# Patient Record
Sex: MALE | Race: WHITE | NOT HISPANIC OR LATINO | Employment: FULL TIME | URBAN - METROPOLITAN AREA
[De-identification: names, ages, dates, MRNs, and addresses within clinical notes are randomized per-mention and may not be internally consistent; named-entity substitution may affect disease eponyms.]

---

## 2017-02-03 ENCOUNTER — ALLSCRIPTS OFFICE VISIT (OUTPATIENT)
Dept: OTHER | Facility: OTHER | Age: 42
End: 2017-02-03

## 2017-05-22 ENCOUNTER — LAB (OUTPATIENT)
Dept: LAB | Facility: HOSPITAL | Age: 42
End: 2017-05-22
Attending: INTERNAL MEDICINE
Payer: COMMERCIAL

## 2017-05-22 ENCOUNTER — TRANSCRIBE ORDERS (OUTPATIENT)
Dept: ADMINISTRATIVE | Facility: HOSPITAL | Age: 42
End: 2017-05-22

## 2017-05-22 DIAGNOSIS — R53.83 FATIGUE, UNSPECIFIED TYPE: ICD-10-CM

## 2017-05-22 DIAGNOSIS — R10.9 ABDOMINAL PAIN, UNSPECIFIED SITE: ICD-10-CM

## 2017-05-22 DIAGNOSIS — Z79.899 ENCOUNTER FOR LONG-TERM (CURRENT) USE OF OTHER MEDICATIONS: ICD-10-CM

## 2017-05-22 DIAGNOSIS — E29.1 3-OXO-5 ALPHA-STEROID DELTA 4-DEHYDROGENASE DEFICIENCY: ICD-10-CM

## 2017-05-22 DIAGNOSIS — F43.9 STATE OF STRESS: ICD-10-CM

## 2017-05-22 DIAGNOSIS — R10.9 ABDOMINAL PAIN, UNSPECIFIED SITE: Primary | ICD-10-CM

## 2017-05-22 LAB
ALBUMIN SERPL BCP-MCNC: 3.9 G/DL (ref 3.5–5)
ALP SERPL-CCNC: 48 U/L (ref 46–116)
ALT SERPL W P-5'-P-CCNC: 24 U/L (ref 12–78)
ANION GAP SERPL CALCULATED.3IONS-SCNC: 7 MMOL/L (ref 4–13)
AST SERPL W P-5'-P-CCNC: 14 U/L (ref 5–45)
BASOPHILS # BLD AUTO: 0 THOUSANDS/ΜL (ref 0–0.1)
BASOPHILS NFR BLD AUTO: 1 % (ref 0–1)
BILIRUB SERPL-MCNC: 0.5 MG/DL (ref 0.2–1)
BILIRUB UR QL STRIP: NEGATIVE
BUN SERPL-MCNC: 18 MG/DL (ref 5–25)
CALCIUM SERPL-MCNC: 8.8 MG/DL (ref 8.3–10.1)
CHLORIDE SERPL-SCNC: 105 MMOL/L (ref 100–108)
CHOLEST SERPL-MCNC: 157 MG/DL (ref 50–200)
CLARITY UR: CLEAR
CO2 SERPL-SCNC: 32 MMOL/L (ref 21–32)
COLOR UR: YELLOW
CREAT SERPL-MCNC: 0.9 MG/DL (ref 0.6–1.3)
EOSINOPHIL # BLD AUTO: 0.1 THOUSAND/ΜL (ref 0–0.61)
EOSINOPHIL NFR BLD AUTO: 2 % (ref 0–6)
ERYTHROCYTE [DISTWIDTH] IN BLOOD BY AUTOMATED COUNT: 13.4 % (ref 11.6–15.1)
ERYTHROCYTE [SEDIMENTATION RATE] IN BLOOD: 2 MM/HOUR (ref 2–10)
GFR SERPL CREATININE-BSD FRML MDRD: >60 ML/MIN/1.73SQ M
GLUCOSE P FAST SERPL-MCNC: 93 MG/DL (ref 65–99)
GLUCOSE UR STRIP-MCNC: NEGATIVE MG/DL
HCT VFR BLD AUTO: 44.4 % (ref 42–52)
HDLC SERPL-MCNC: 42 MG/DL (ref 40–60)
HGB BLD-MCNC: 14.7 G/DL (ref 14–18)
HGB UR QL STRIP.AUTO: NEGATIVE
IRON SERPL-MCNC: 92 UG/DL (ref 65–175)
KETONES UR STRIP-MCNC: NEGATIVE MG/DL
LDLC SERPL CALC-MCNC: 100 MG/DL (ref 0–100)
LEUKOCYTE ESTERASE UR QL STRIP: NEGATIVE
LYMPHOCYTES # BLD AUTO: 2.4 THOUSANDS/ΜL (ref 0.6–4.47)
LYMPHOCYTES NFR BLD AUTO: 42 % (ref 14–44)
MAGNESIUM SERPL-MCNC: 2.3 MG/DL (ref 1.6–2.6)
MCH RBC QN AUTO: 27.2 PG (ref 27–31)
MCHC RBC AUTO-ENTMCNC: 33 G/DL (ref 31.4–37.4)
MCV RBC AUTO: 82 FL (ref 82–98)
MONOCYTES # BLD AUTO: 0.4 THOUSAND/ΜL (ref 0.17–1.22)
MONOCYTES NFR BLD AUTO: 8 % (ref 4–12)
NEUTROPHILS # BLD AUTO: 2.8 THOUSANDS/ΜL (ref 1.85–7.62)
NEUTS SEG NFR BLD AUTO: 48 % (ref 43–75)
NITRITE UR QL STRIP: NEGATIVE
NRBC BLD AUTO-RTO: 0 /100 WBCS
PH UR STRIP.AUTO: 6.5 [PH] (ref 5–9)
PLATELET # BLD AUTO: 327 THOUSANDS/UL (ref 130–400)
PMV BLD AUTO: 8.2 FL (ref 8.9–12.7)
POTASSIUM SERPL-SCNC: 4.1 MMOL/L (ref 3.5–5.3)
PROT SERPL-MCNC: 6.6 G/DL (ref 6.4–8.2)
PROT UR STRIP-MCNC: NEGATIVE MG/DL
RBC # BLD AUTO: 5.39 MILLION/UL (ref 4.7–6.1)
SODIUM SERPL-SCNC: 144 MMOL/L (ref 136–145)
SP GR UR STRIP.AUTO: 1.02 (ref 1–1.03)
TESTOST SERPL-MCNC: 248.8 NG/DL (ref 241–827)
TRIGL SERPL-MCNC: 77 MG/DL
TSH SERPL DL<=0.05 MIU/L-ACNC: 1.29 UIU/ML (ref 0.36–3.74)
UROBILINOGEN UR QL STRIP.AUTO: 0.2 E.U./DL
VIT B12 SERPL-MCNC: 572 PG/ML (ref 100–900)
WBC # BLD AUTO: 5.8 THOUSAND/UL (ref 4.8–10.8)

## 2017-05-22 PROCEDURE — 84403 ASSAY OF TOTAL TESTOSTERONE: CPT

## 2017-05-22 PROCEDURE — 83516 IMMUNOASSAY NONANTIBODY: CPT

## 2017-05-22 PROCEDURE — 80061 LIPID PANEL: CPT

## 2017-05-22 PROCEDURE — 85652 RBC SED RATE AUTOMATED: CPT

## 2017-05-22 PROCEDURE — 81003 URINALYSIS AUTO W/O SCOPE: CPT

## 2017-05-22 PROCEDURE — 36415 COLL VENOUS BLD VENIPUNCTURE: CPT

## 2017-05-22 PROCEDURE — 84443 ASSAY THYROID STIM HORMONE: CPT

## 2017-05-22 PROCEDURE — 85025 COMPLETE CBC W/AUTO DIFF WBC: CPT

## 2017-05-22 PROCEDURE — 80053 COMPREHEN METABOLIC PANEL: CPT

## 2017-05-22 PROCEDURE — 83540 ASSAY OF IRON: CPT

## 2017-05-22 PROCEDURE — 86255 FLUORESCENT ANTIBODY SCREEN: CPT

## 2017-05-22 PROCEDURE — 82607 VITAMIN B-12: CPT

## 2017-05-22 PROCEDURE — 82784 ASSAY IGA/IGD/IGG/IGM EACH: CPT

## 2017-05-22 PROCEDURE — 83735 ASSAY OF MAGNESIUM: CPT

## 2017-05-22 PROCEDURE — 86618 LYME DISEASE ANTIBODY: CPT

## 2017-05-23 LAB
ENDOMYSIUM IGA SER QL: NEGATIVE
GLIADIN PEPTIDE IGA SER-ACNC: 3 UNITS (ref 0–19)
GLIADIN PEPTIDE IGG SER-ACNC: 1 UNITS (ref 0–19)
IGA SERPL-MCNC: 190 MG/DL (ref 90–386)
TTG IGA SER-ACNC: <2 U/ML (ref 0–3)
TTG IGG SER-ACNC: <2 U/ML (ref 0–5)

## 2017-05-24 LAB
B BURGDOR IGG SER IA-ACNC: 0.3
B BURGDOR IGM SER IA-ACNC: 0.2

## 2017-12-30 ENCOUNTER — ALLSCRIPTS OFFICE VISIT (OUTPATIENT)
Dept: OTHER | Facility: OTHER | Age: 42
End: 2017-12-30

## 2017-12-31 NOTE — PROGRESS NOTES
Assessment   1  Acute sinusitis (461 9) (J01 90)    Plan   Acute sinusitis    · Amoxicillin-Pot Clavulanate 875-125 MG Oral Tablet (Augmentin); TAKE 1 TABLET    EVERY 12 HOURS UNTIL GONE   · Combivent Respimat  MCG/ACT Inhalation Aerosol Solution; INHALE 1    PUFF 4 TIMES DAILY (MAXIMUM OF 6 PUFFS IN 24 HOURS)   · Ipratropium-Albuterol 0 5-2 5 (3) MG/3ML Inhalation Solution; given in office   · Irrigate your nose twice a day ; Status:Complete;   Done: [id number] 11:58AM   · Taking a hot steamy shower may help your condition ; Status:Complete;   Done:    68DVF0352 11:58AM   · Call [phone] if: The symptoms are not better in 7 days ; Status:Complete;   Done:    37PUF0452 11:58AM    Discussion/Summary      Follow up if not improving  Chief Complaint   sinus & chest congestion ,soretthroat      History of Present Illness   HPI: Here with illness for 2-3 weeks  Cold Symptoms:    Kayleigh Godwin presents with complaints of gradual onset of moderate cold symptoms starting about 2 weeks ago  He is currently experiencing cold symptoms  Symptoms have been worsening for about 3 days  Associated symptoms include nasal congestion,-- sore throat,-- ear pain,-- wheezing,-- shortness of breath-- and-- chills  The patient presents with complaints of productive cough (green mucus)      The patient presents with complaints of fever, described as > 101 f  Review of Systems        Constitutional: as noted in HPI       ENT: as noted in HPI  Respiratory: as noted in HPI  Active Problems   1  Acid reflux disease (530 81) (K21 9)   2  Acute sinusitis (461 9) (J01 90)   3  Anxiety (300 00) (F41 9)   4  Body mass index (BMI) of 23 0 to 23 9 in adult (V85 1) (Z68 23)   5  Depression (311) (F32 9)   6  Hyperlipidemia (272 4) (E78 5)   7  Long term use of drug (V58 69) (Z79 899)   8  Never a smoker   9  Persistent insomnia (307 42) (G47 00)    Past Medical History   1   Contact dermatitis due to plant (692  6) (L25 5)   2  History of Exposure to influenza (V01 79) (Z20 828)   3  History of acute pharyngitis (V12 69) (Z87 09)   4  History of influenza (V12 09) (Z87 09)   5  Other acute sinusitis (461 8) (J01 80)  Active Problems And Past Medical History Reviewed: The active problems and past medical history were reviewed and updated today  Family History   Mother    1  Family history of hypertension (V17 49) (Z82 49)   2  Family history of lung cancer (V16 1) (Z80 1)  Father    3  Family history of Anxiety   4  Family history of hypertension (V17 49) (Z82 49)  Family History Reviewed: The family history was reviewed and updated today  Social History    · Never a smoker  The social history was reviewed and updated today  The social history was reviewed and is unchanged  Surgical History   1  History of Appendectomy   2  History of Cholecystectomy   3  History of Inguinal Hernia Repair   4  History of Umbilical Hernia Repair   5  History of Ventral Hernia Repair  Surgical History Reviewed: The surgical history was reviewed and updated today  Current Meds    1  Pantoprazole Sodium 40 MG Oral Tablet Delayed Release; take one tablet by mouth     every day; Therapy[facility] 08POO2586 [facility]:[id number])  Requested for: 21Msk9605; Last     Rx:[id number] Ordered     The medication list was reviewed and updated today  Allergies   1  Crestor TABS   2  Levaquin TABS    Vitals    Recorded: 95OUR3838 11:26AM   Temperature 97 2 F   Heart Rate 84   Respiration 16   Systolic 157   Diastolic 86   Height 5 ft 10 in   Weight 210 lb    BMI Calculated 30 13   BSA Calculated 2 13     Physical Exam        Constitutional      General appearance: No acute distress, well appearing and well nourished  Ears, Nose, Mouth, and Throat      External inspection of ears and nose: Normal        Otoscopic examination: Abnormal   The right tympanic membrane was retracted   The left tympanic membrane was retracted  Nasal mucosa, septum, and turbinates: Abnormal   The bilateral nasal mucosa was crusted,-- edematous-- and-- red  Oropharynx: Normal with no erythema, edema, exudate or lesions  Pulmonary      Respiratory effort: No increased work of breathing or signs of respiratory distress  Auscultation of lungs: Abnormal  -- (decreased air movement, improved after duoneb in the office)      Cardiovascular      Auscultation of heart: Normal rate and rhythm, normal S1 and S2, without murmurs  Lymphatic      Palpation of lymph nodes in neck: No lymphadenopathy            Signatures    Electronically signed by : GRAYSON Ramirez ; Dec 30 2017 11:59AM EST                       (Author)

## 2018-01-12 VITALS
DIASTOLIC BLOOD PRESSURE: 80 MMHG | HEIGHT: 70 IN | SYSTOLIC BLOOD PRESSURE: 130 MMHG | RESPIRATION RATE: 18 BRPM | HEART RATE: 78 BPM | BODY MASS INDEX: 23.05 KG/M2 | TEMPERATURE: 98.2 F | WEIGHT: 161 LBS

## 2018-01-15 NOTE — MISCELLANEOUS
Message   Recorded as Task   Date: 12/06/2016 06:03 PM, Created By: Win Dick   Task Name: Medical Complaint Callback   Assigned To: Sreekanth Kapadia   Regarding Patient: Tessie Silver, Status: Active   Comment:    Win Dick - 06 Dec 2016 6:03 PM     TASK CREATED  Caller: Becca, Spouse; Medical Complaint  Patient was seen earlier today for poison ivy, he was given a shot and told he would start to see relief soon  His rashes are getting worse  His wife called because she feels he is going to need a steroid pill and would like it this evening sent to pharmacy  Please call her at [phone]   [facility] - 06 Dec 2016 6:05 PM     TASK REASSIGNED: Previously Assigned To Saad1 [employer]  please advise     Sreekanth Kapadia - 06 Dec 2016 7:11 PM     TASK EDITED  s/w wife  not better yet  I suggested Claritin or equiv qd with Benadryl qhs prn since he's drowsy  f/u if no better  give it 24-48hrs to work        Signatures   Electronically signed by : Leigh Abdi DO; Dec  6 2016  7:11PM EST                       (Author)

## 2018-01-15 NOTE — MISCELLANEOUS
Provider Comments  Provider Comments:   LMOM REGARDING NO SHOW ON 9/28/2016      Signatures   Electronically signed by : Giles Garcia DO; Sep 28 2016  7:26PM EST                       (Review)

## 2018-01-23 VITALS
SYSTOLIC BLOOD PRESSURE: 126 MMHG | DIASTOLIC BLOOD PRESSURE: 86 MMHG | TEMPERATURE: 97.2 F | BODY MASS INDEX: 30.06 KG/M2 | WEIGHT: 210 LBS | RESPIRATION RATE: 16 BRPM | HEART RATE: 84 BPM | HEIGHT: 70 IN

## 2018-02-01 ENCOUNTER — OFFICE VISIT (OUTPATIENT)
Dept: FAMILY MEDICINE CLINIC | Facility: CLINIC | Age: 43
End: 2018-02-01
Payer: COMMERCIAL

## 2018-02-01 VITALS
HEIGHT: 71 IN | DIASTOLIC BLOOD PRESSURE: 80 MMHG | WEIGHT: 213 LBS | TEMPERATURE: 98.3 F | SYSTOLIC BLOOD PRESSURE: 124 MMHG | HEART RATE: 76 BPM | BODY MASS INDEX: 29.82 KG/M2 | RESPIRATION RATE: 20 BRPM

## 2018-02-01 DIAGNOSIS — R91.1 PULMONARY NODULE: ICD-10-CM

## 2018-02-01 DIAGNOSIS — J20.9 ACUTE BRONCHITIS, UNSPECIFIED ORGANISM: Primary | ICD-10-CM

## 2018-02-01 PROCEDURE — 96372 THER/PROPH/DIAG INJ SC/IM: CPT | Performed by: INTERNAL MEDICINE

## 2018-02-01 PROCEDURE — 99213 OFFICE O/P EST LOW 20 MIN: CPT | Performed by: INTERNAL MEDICINE

## 2018-02-01 RX ORDER — AMOXICILLIN AND CLAVULANATE POTASSIUM 875; 125 MG/1; MG/1
1 TABLET, FILM COATED ORAL EVERY 12 HOURS
COMMUNITY
Start: 2017-12-30 | End: 2018-02-01 | Stop reason: SDUPTHER

## 2018-02-01 RX ORDER — IPRATROPIUM BROMIDE AND ALBUTEROL SULFATE 2.5; .5 MG/3ML; MG/3ML
3 SOLUTION RESPIRATORY (INHALATION)
Status: DISCONTINUED | OUTPATIENT
Start: 2018-02-01 | End: 2018-02-15

## 2018-02-01 RX ORDER — METHYLPREDNISOLONE 4 MG/1
TABLET ORAL
Qty: 21 TABLET | Refills: 0 | Status: SHIPPED | OUTPATIENT
Start: 2018-02-01 | End: 2018-02-15

## 2018-02-01 RX ORDER — AMOXICILLIN AND CLAVULANATE POTASSIUM 875; 125 MG/1; MG/1
1 TABLET, FILM COATED ORAL EVERY 12 HOURS
Qty: 20 TABLET | Refills: 0 | Status: SHIPPED | OUTPATIENT
Start: 2018-02-01 | End: 2018-02-11

## 2018-02-01 RX ADMIN — IPRATROPIUM BROMIDE AND ALBUTEROL SULFATE 3 ML: 2.5; .5 SOLUTION RESPIRATORY (INHALATION) at 19:22

## 2018-02-01 NOTE — PROGRESS NOTES
Assessment/Plan:    No problem-specific Assessment & Plan notes found for this encounter  Diagnoses and all orders for this visit:    Acute bronchitis, unspecified organism  -     ipratropium-albuterol (DUO-NEB) 0 5-2 5 mg/3 mL inhalation solution 3 mL; Take 3 mL by nebulization every 6 (six) hours   -     CT chest wo contrast; Future  -     amoxicillin-clavulanate (AUGMENTIN) 875-125 mg per tablet; Take 1 tablet by mouth every 12 (twelve) hours for 10 days  -     Methylprednisolone 4 MG TBPK; Use as directed on package  -     Ambulatory referral to Pulmonology; Future    Pulmonary nodule  -     CT chest wo contrast; Future  -     Ambulatory referral to Pulmonology; Future    Other orders  -     ipratropium-albuterol (COMBIVENT RESPIMAT) inhaler; Inhale 1 puff 4 (four) times a day  -     Discontinue: amoxicillin-clavulanate (AUGMENTIN) 875-125 mg per tablet; Take 1 tablet by mouth every 12 (twelve) hours          There are no Patient Instructions on file for this visit  Return if symptoms worsen or fail to improve  Subjective:      Patient ID: Anne Marie Ward is a 44 y o  male  Chief Complaint   Patient presents with    Chest Pain     chest tightness    Shortness of Breath    Cough     dry cough    Nasal Drainage       Here with an acute illness  Did get better after last visit 12/30 but never got rid of sinus symptoms but got acutely worse about a week ago  Lungs are burning, feels like he has a lot of phlegm, feels short of breath at nighttime and when he wakes in the morning  Cold air feels good  Never needed an inhaler prior to last visit  Has a lot of exposure to smoke and chemicals at work (is a )  Also has history of pulmonary nodule by Ct, previous physician passed away and he is in need of follow up study          The following portions of the patient's history were reviewed and updated as appropriate: allergies, current medications, past family history, past medical history, past social history, past surgical history and problem list     Review of Systems   Constitutional: Positive for fever  Negative for fatigue  HENT: Positive for congestion, postnasal drip, rhinorrhea and sinus pressure  Respiratory: Positive for cough, shortness of breath and wheezing  Cardiovascular: Negative  Current Outpatient Prescriptions   Medication Sig Dispense Refill    Ibuprofen (ADVIL) 200 MG CAPS Take 1 tablet by mouth 2 (two) times a day   ipratropium-albuterol (COMBIVENT RESPIMAT) inhaler Inhale 1 puff 4 (four) times a day      pantoprazole (PROTONIX) 40 mg tablet Take 40 mg by mouth daily   amoxicillin-clavulanate (AUGMENTIN) 875-125 mg per tablet Take 1 tablet by mouth every 12 (twelve) hours for 10 days 20 tablet 0    desvenlafaxine (PRISTIQ) 100 mg 24 hr tablet Take 40 mg by mouth daily   LORazepam (ATIVAN) 0 5 mg tablet Take 0 5 mg by mouth every 6 (six) hours as needed for anxiety   Methylprednisolone 4 MG TBPK Use as directed on package 21 tablet 0    temazepam (RESTORIL) 7 5 mg capsule Take 30 mg by mouth daily at bedtime as needed for sleep  Current Facility-Administered Medications   Medication Dose Route Frequency Provider Last Rate Last Dose    ipratropium-albuterol (DUO-NEB) 0 5-2 5 mg/3 mL inhalation solution 3 mL  3 mL Nebulization Q6H Nataliia Rodriguez MD           Objective:    /80   Pulse 76   Temp 98 3 °F (36 8 °C)   Resp 20   Ht 5' 11" (1 803 m)   Wt 96 6 kg (213 lb)   BMI 29 71 kg/m²        Physical Exam   Constitutional: He appears well-developed and well-nourished  HENT:   Head: Normocephalic and atraumatic  Right Ear: Tympanic membrane is retracted  Left Ear: Tympanic membrane is retracted  Nose: Mucosal edema present  MMM   Eyes: Conjunctivae are normal    Neck: Neck supple  Cardiovascular: Normal rate, regular rhythm and normal heart sounds  Pulmonary/Chest: Effort normal  He has no wheezes  Poor air movement; improved after duoneb  No wheezes, rales, rhonchi  Lymphadenopathy:     He has no cervical adenopathy                Leigh Stock MD

## 2018-02-02 NOTE — ASSESSMENT & PLAN NOTE
Due to the recurrent nature and possible exposures at work, patient was referred to pulmonology  Advised ER for worsening symptoms, return visit if not improving

## 2018-02-05 ENCOUNTER — TELEPHONE (OUTPATIENT)
Dept: FAMILY MEDICINE CLINIC | Facility: CLINIC | Age: 43
End: 2018-02-05

## 2018-02-05 ENCOUNTER — HOSPITAL ENCOUNTER (OUTPATIENT)
Dept: CT IMAGING | Facility: HOSPITAL | Age: 43
Discharge: HOME | End: 2018-02-05
Attending: INTERNAL MEDICINE
Payer: COMMERCIAL

## 2018-02-05 DIAGNOSIS — J20.9 ACUTE BRONCHITIS, UNSPECIFIED ORGANISM: ICD-10-CM

## 2018-02-05 DIAGNOSIS — J20.9 ACUTE BRONCHITIS, UNSPECIFIED ORGANISM: Primary | ICD-10-CM

## 2018-02-05 DIAGNOSIS — R91.1 PULMONARY NODULE: ICD-10-CM

## 2018-02-05 PROCEDURE — 71250 CT THORAX DX C-: CPT

## 2018-02-05 NOTE — TELEPHONE ENCOUNTER
Pt seen Thursday night, prescribed prednisone and abx, also had a ct scan done today   Still having difficulty breathing at night, anything else we can recommend or prescribe

## 2018-02-05 NOTE — TELEPHONE ENCOUNTER
Please advise  Pt was seen by Dr Gagandeep Sainz on 2/1/18  She will not be in office until Wednesday   Siri Hodgkin, [place]

## 2018-02-06 RX ORDER — IPRATROPIUM BROMIDE AND ALBUTEROL SULFATE 2.5; .5 MG/3ML; MG/3ML
3 SOLUTION RESPIRATORY (INHALATION)
Qty: 75 ML | Refills: 0 | Status: SHIPPED | OUTPATIENT
Start: 2018-02-06 | End: 2018-02-15

## 2018-02-06 RX ORDER — PREDNISONE 10 MG/1
10 TABLET ORAL DAILY
Qty: 40 TABLET | Refills: 0 | Status: SHIPPED | OUTPATIENT
Start: 2018-02-06 | End: 2018-02-09 | Stop reason: SDUPTHER

## 2018-02-06 NOTE — TELEPHONE ENCOUNTER
2/6/2018 5:06 PM spoke with his wife  He is not getting better with the inhaler  He did feel better with the nebulizer in the office  I sent in a prescription for florin and called the pharmacy to make sure they had neb supplies for him     Tamiko Muñoz, DO

## 2018-02-06 NOTE — TELEPHONE ENCOUNTER
2/5/2018 7:21 PM Returned call to patient  Unable to leave message on voice mail - [facility] Damon is full  I then call the [phone] number and left a message to call the office  I can see his CT scan was done, but it has not been read yet    Scarlett Vaz, DO

## 2018-02-06 NOTE — TELEPHONE ENCOUNTER
Spoke with wife  Agreed  To the higher dose of steroid  And  Scheduled a follow up appointment with Dr Cecilio Winkler  On Thursday

## 2018-02-06 NOTE — TELEPHONE ENCOUNTER
Pt is aware that the Ct Scan results are not ready, they called again because they want to know if there is something else that he can take or be prescribed to help him breathe at night

## 2018-02-06 NOTE — TELEPHONE ENCOUNTER
[phone]  Madinainocencio Fitching, wife returning call for CT chest results    Please call when they come in    Thank you

## 2018-02-06 NOTE — TELEPHONE ENCOUNTER
2/6/2018 5:19 PM spoke with his wife again  [facility] does not carry the tubing supplies  She is going to try another pharmacy    Erna Godwin, DO

## 2018-02-06 NOTE — TELEPHONE ENCOUNTER
Spoke with Aracelis Patel pt wife  Made aware CT has not been finalized yet  States pt  Is having  A hard time breathing when he lies down, and coughing  He has the prednisone pack and the abx  Aracelis Patel also states  She cleared out all her voicemail messages  Please advise Dr Rani Lopez will not be in until tomorrow      af/rma

## 2018-02-07 ENCOUNTER — TELEPHONE (OUTPATIENT)
Dept: FAMILY MEDICINE CLINIC | Facility: CLINIC | Age: 43
End: 2018-02-07

## 2018-02-07 NOTE — TELEPHONE ENCOUNTER
I spoke with patient about his results, these appear to be stable from last Ct  He is feeling better from nebulizer last night and his wife has already made an appointment with Dr Jim Rai for evaluation of continued symptoms

## 2018-02-09 DIAGNOSIS — J20.9 ACUTE BRONCHITIS, UNSPECIFIED ORGANISM: ICD-10-CM

## 2018-02-09 RX ORDER — PREDNISONE 10 MG/1
10 TABLET ORAL DAILY
Qty: 40 TABLET | Refills: 0 | Status: SHIPPED | OUTPATIENT
Start: 2018-02-09 | End: 2018-02-25

## 2018-02-13 ENCOUNTER — TELEPHONE (OUTPATIENT)
Dept: FAMILY MEDICINE CLINIC | Facility: CLINIC | Age: 43
End: 2018-02-13

## 2018-02-14 ENCOUNTER — TELEPHONE (OUTPATIENT)
Dept: FAMILY MEDICINE CLINIC | Facility: CLINIC | Age: 43
End: 2018-02-14

## 2018-02-14 ENCOUNTER — TRANSCRIBE ORDERS (OUTPATIENT)
Dept: PULMONOLOGY | Facility: MEDICAL CENTER | Age: 43
End: 2018-02-14

## 2018-02-14 DIAGNOSIS — K21.9 GASTROESOPHAGEAL REFLUX DISEASE, ESOPHAGITIS PRESENCE NOT SPECIFIED: Primary | ICD-10-CM

## 2018-02-14 RX ORDER — PANTOPRAZOLE SODIUM 40 MG/1
40 TABLET, DELAYED RELEASE ORAL DAILY
Qty: 30 TABLET | Refills: 3 | Status: SHIPPED | OUTPATIENT
Start: 2018-02-14 | End: 2018-04-02 | Stop reason: SDUPTHER

## 2018-02-14 NOTE — TELEPHONE ENCOUNTER
DR    Please call in pantoprazole to [facility] in [place]     ASAP

## 2018-02-14 NOTE — TELEPHONE ENCOUNTER
Wife Cammie Murguia is calling because she is trying to get an apt for her  at [address] Gigi Gibbs and they can't get him in until March  She wants us to call  Stoney Nealcher is not doing any better, on 3rd round of steroids, still using neubulizer  He has an apt with Dr Harper Diane but wife thinks it is a waste of time  She wants to see a specialists

## 2018-02-15 ENCOUNTER — OFFICE VISIT (OUTPATIENT)
Dept: PULMONOLOGY | Facility: MEDICAL CENTER | Age: 43
End: 2018-02-15
Payer: COMMERCIAL

## 2018-02-15 VITALS
RESPIRATION RATE: 18 BRPM | SYSTOLIC BLOOD PRESSURE: 158 MMHG | BODY MASS INDEX: 29.96 KG/M2 | OXYGEN SATURATION: 94 % | TEMPERATURE: 98.5 F | HEIGHT: 71 IN | HEART RATE: 74 BPM | WEIGHT: 214 LBS | DIASTOLIC BLOOD PRESSURE: 98 MMHG

## 2018-02-15 DIAGNOSIS — R06.02 SHORTNESS OF BREATH: Primary | ICD-10-CM

## 2018-02-15 DIAGNOSIS — J20.9 SUBACUTE BRONCHITIS: ICD-10-CM

## 2018-02-15 DIAGNOSIS — R91.1 PULMONARY NODULE: ICD-10-CM

## 2018-02-15 DIAGNOSIS — K21.9 GASTROESOPHAGEAL REFLUX DISEASE, ESOPHAGITIS PRESENCE NOT SPECIFIED: ICD-10-CM

## 2018-02-15 PROCEDURE — 94010 BREATHING CAPACITY TEST: CPT | Performed by: INTERNAL MEDICINE

## 2018-02-15 PROCEDURE — 99204 OFFICE O/P NEW MOD 45 MIN: CPT | Performed by: INTERNAL MEDICINE

## 2018-02-15 RX ORDER — ALBUTEROL SULFATE 0.63 MG/3ML
1 SOLUTION RESPIRATORY (INHALATION) EVERY 6 HOURS PRN
Qty: 75 ML | Refills: 0 | Status: SHIPPED | OUTPATIENT
Start: 2018-02-15 | End: 2018-10-18 | Stop reason: ALTCHOICE

## 2018-02-15 NOTE — ASSESSMENT & PLAN NOTE
Pulmonary nodule which has been stable  However prior CT images are not available for review  They state that he did have his CT scans here at [facility] will need to go to Radiology were department and review  However these are very small in based on Fleischner criteria they do not require any further follow-up  Imaging is reviewed with the patient and his wife today  Patient's wife request report and this is printed for her today

## 2018-02-15 NOTE — PROGRESS NOTES
Assessment/Plan:       Problem List Items Addressed This Visit        Digestive    Acid reflux disease     This is controlled  However he is advised to take prednisone with food and he will let me know if heartburn worsens with prednisone  Respiratory    Subacute bronchitis     Patient is currently improving with prednisone and nebulizer therapy however he is unable to use the nebulizer as prescribed as it does make him jittery  Patient does have coarse breath sounds and therefore I do believe he would benefit from inhaled steroid  Will start him on Breo  Sample is given to him today  Continue with prednisone  He may use the nebulizer once daily in the evening  Spirometry shows no evidence of obstruction  This is likely virally mediated versus sinus related  Once he is improved he will require repeat ENT evaluation  He is advised to use a mask when he has any occupational exposure as this will help in the healing of his bronchitis  Otherwise continued exposure may make things worse  Relevant Medications    albuterol (ACCUNEB) 0 63 MG/3ML nebulizer solution       Other    Pulmonary nodule     Pulmonary nodule which has been stable  However prior CT images are not available for review  They state that he did have his CT scans here at [facility] will need to go to Radiology were department and review  However these are very small in based on Fleischner criteria they do not require any further follow-up  Imaging is reviewed with the patient and his wife today  Patient's wife request report and this is printed for her today               Other Visit Diagnoses     Shortness of breath    -  Primary    Relevant Medications    albuterol (ACCUNEB) 0 63 MG/3ML nebulizer solution    Other Relevant Orders    POCT spirometry         Patient has markedly elevated [place] sleepiness scale and there is suspicion of underlying obstructive sleep apnea however in the setting of his acute bronchitis will hold off on any testing at this time  Return in about 2 weeks (around 3/1/2018)  All questions are answered to the patient's satisfaction and understanding  He verbalizes understanding  He is encouraged to call with any further questions or concerns  Portions of the record may have been created with voice recognition software  Occasional wrong word or "sound a like" substitutions may have occurred due to the inherent limitations of voice recognition software  Read the chart carefully and recognize, using context, where substitutions have occurred  ______________________________________________________________________    Chief Complaint:   Chief Complaint   Patient presents with    Shortness of Breath     intermittant mostly at night    Bronchitis     Since beginning of December 2017 had 2 rounds of antibiotics     Cough     dry deep occasionally productive        Patient ID: Roger Trejo is a 44 y o  y o  male has a past medical history of Back pain and GERD (gastroesophageal reflux disease)  2/15/2018  Patient presents today for initial pulmonary evaluation for persistent bronchitis for approximately 2 months now  He presents today with his wife  Upon review of recent PCP visits and based on history-  His illness started with acute sinusitis which he initially tried to medicate with Sudafed and other over-the-counter medications however this did not resolve and therefore he saw his primary care physician toward the end of December and was prescribed with antibiotics at that time  He was also prescribed with Combivent at that time because he had complaints of wheezing  His symptoms did improve in between but gradually got worse again with complaints of cough, congestion, sinus problems, wheezing, difficulty taking a deep breath  No fevers    After seeing his primary care physician CT chest was obtained and showed no pneumonia patient did have intrapulmonary lymph node small left lung nodule approximately 2 mm and multiple tiny right peripheral nodules  Patient did have evidence of atelectasis  He is subsequently prescribed with nebulizer DuoNeb and antibiotics at the beginning of this month and he is just now finishing those antibiotics  He was also prescribed with Medrol Dosepak however that did not help  He most recently because of persistence of symptoms he is prescribed with prednisone 40 mg and taper  His breathing and cough is improved on the prednisone  Patient has not taken any time off since developing these symptoms  He does work as a  and is around dust and fumes all the time  He also works around [employer] and fumes associated with this  States that he does wear a mask when he is around the plastics but does not wear a mask any other time  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  Blood pressure is elevated today  Patient states that his blood pressure is never elevated  Possibly in relation to steroids  Steroids are also causing him to be agitated and irritated  When he takes it in the evening it prevents him from falling asleep  His [place] Sleepiness Scale is 20/24 today  He does have snoring  Unknown witnessed apneas or choking and gasping in his sleep  He has never had a sleep study in the past       Breathing Problem   He complains of chest tightness, cough, difficulty breathing, shortness of breath (Does not feel like he is back to his baseline) and wheezing  There is no frequent throat clearing, hemoptysis, hoarse voice or sputum production  This is a recurrent problem  The current episode started more than 1 month ago  The problem occurs constantly  The problem has been gradually improving  The cough is non-productive (Initially productive)   Associated symptoms include chest pain ( patient had chest tightness), dyspnea on exertion, myalgias (This is not new in relation to underlying work ), nasal congestion and postnasal drip ( patient has chronic nasal issues and has seen an ENT physician in the past )  Pertinent negatives include no appetite change, ear congestion, ear pain, fever, headaches, heartburn ( patient has a history of heartburn and takes Protonix for this and heartburn is controlled ), malaise/fatigue, orthopnea, PND, rhinorrhea, sneezing, sore throat, sweats, trouble swallowing or weight loss  Exacerbated by: Nothing known  His symptoms are alleviated by beta-agonist and oral steroids  He reports moderate improvement on treatment  Risk factors for lung disease include occupational exposure  Occupational/Exposure history: works as a - see above  Travel history: no recent travel    Review of Systems   Constitutional: Negative for appetite change, fever, malaise/fatigue and weight loss  HENT: Positive for postnasal drip ( patient has chronic nasal issues and has seen an ENT physician in the past )  Negative for ear pain, hoarse voice, rhinorrhea, sneezing, sore throat and trouble swallowing  Eyes: Negative for visual disturbance  Respiratory: Positive for cough, shortness of breath (Does not feel like he is back to his baseline) and wheezing  Negative for hemoptysis and sputum production  Cardiovascular: Positive for chest pain ( patient had chest tightness) and dyspnea on exertion  Negative for palpitations, leg swelling and PND  Gastrointestinal: Negative for abdominal distention, abdominal pain, diarrhea, heartburn ( patient has a history of heartburn and takes Protonix for this and heartburn is controlled ), nausea and vomiting  Endocrine: Negative for cold intolerance  Genitourinary: Negative for difficulty urinating  Musculoskeletal: Positive for myalgias (This is not new in relation to underlying work  )  Negative for arthralgias and back pain  Skin: Negative for color change and pallor  Allergic/Immunologic: Negative for environmental allergies and food allergies     Neurological: Negative for dizziness, numbness and headaches  Hematological: Negative for adenopathy  Psychiatric/Behavioral: Negative for agitation, behavioral problems and confusion  Social history: He reports that he has never smoked  He has never used smokeless tobacco  He reports that he does not drink alcohol or use drugs  Past surgical history:   Past Surgical History:   Procedure Laterality Date    APPENDECTOMY      CHOLECYSTECTOMY      HERNIA REPAIR      FL EXC SKIN BENIG 1 1-2 CM TRUNK,ARM,LEG Right 1/20/2016    Procedure: REVISION SCAR ABDOMINAL Exploration of painful scar on right upper abdomen;  Surgeon: William Blake MD;  Location: [address] [place] [address];  Service: General    [address] [place] [address] N/A 1/20/2016    Procedure: REPAIR HERNIA VENTRAL (SMALL EPIGASTRIC HERNIA); Surgeon: William Blake MD;  Location: [facility];  Service: General     Family history:   Family History   Problem Relation Age of Onset    Cancer Mother     Hypertension Mother     Arthritis Mother     Hypertension Father     Hiatal hernia Father     Arthritis Father     Cancer Maternal Uncle          There is no immunization history on file for this patient  Current Outpatient Prescriptions   Medication Sig Dispense Refill    Ibuprofen (ADVIL) 200 MG CAPS Take 1 tablet by mouth 2 (two) times a day   pantoprazole (PROTONIX) 40 mg tablet Take 1 tablet (40 mg total) by mouth daily 30 tablet 3    predniSONE 10 mg tablet Take 1 tablet (10 mg total) by mouth daily for 16 days Take 4 daily for 4 days, then 3 daily for 4 days, then 2 daily for 4 days, then 1 daily for 4 days  Take with food   40 tablet 0    albuterol (ACCUNEB) 0 63 MG/3ML nebulizer solution Take 3 mL (0 63 mg total) by nebulization every 6 (six) hours as needed for wheezing 75 mL 0     Current Facility-Administered Medications   Medication Dose Route Frequency Provider Last Rate Last Dose    ipratropium-albuterol (DUO-NEB) 0 5-2 5 mg/3 mL inhalation solution 3 mL  3 mL Nebulization Q6H Pauline Kawasaki, MD   3 mL at 02/01/18 1922     Allergies: Levaquin [levofloxacin] and Rosuvastatin    Objective:  Vitals:    02/15/18 1042   BP: 158/98   BP Location: Left arm   Patient Position: Sitting   Cuff Size: Standard   Pulse: 74   Resp: 18   Temp: 98 5 °F (36 9 °C)   TempSrc: Oral   SpO2: 94%   Weight: 97 1 kg (214 lb)   Height: 5' 10 5" (1 791 m)   Oxygen Therapy  SpO2: 94 %    Wt Readings from Last 3 Encounters:   02/15/18 97 1 kg (214 lb)   02/01/18 96 6 kg (213 lb)   12/30/17 95 3 kg (210 lb)     Body mass index is 30 27 kg/m²  Physical Exam   Constitutional: He is oriented to person, place, and time  He appears well-developed and well-nourished  No distress  HENT:   Head: Normocephalic and atraumatic  Mouth/Throat: Oropharyngeal exudate (erythema) present  Mallampati 2   Eyes: EOM are normal  Pupils are equal, round, and reactive to light  Neck: Normal range of motion  Neck supple  No tracheal deviation present  No thyromegaly present  Cardiovascular: Normal rate and regular rhythm  No murmur heard  Pulmonary/Chest: Effort normal  No respiratory distress  He has no wheezes  He has no rales  He exhibits no tenderness  Coarse breath sounds bilaterally   Abdominal: Soft  Bowel sounds are normal  He exhibits no distension  There is no tenderness  Musculoskeletal: Normal range of motion  He exhibits no edema  Lymphadenopathy:     He has no cervical adenopathy  Neurological: He is alert and oriented to person, place, and time  No cranial nerve deficit  Skin: Skin is warm and dry  He is not diaphoretic  Psychiatric: He has a normal mood and affect  His behavior is normal    Vitals reviewed        Lab Review:   Lab Results   Component Value Date     05/22/2017    K 4 1 05/22/2017     05/22/2017    CO2 32 05/22/2017    BUN 18 05/22/2017    CREATININE 0 90 05/22/2017    GLUCOSE 92 02/27/2016    CALCIUM 8 8 05/22/2017     Lab Results   Component Value Date    WBC 5 80 05/22/2017    HGB 14 7 05/22/2017    HCT 44 4 05/22/2017    MCV 82 05/22/2017     05/22/2017       Diagnostics:  I have personally reviewed pertinent reports  and I have personally reviewed pertinent films in PACS  CT chest 2/6/2018 as visualized by me shows atelectatic changes and nodules are also visualized  No evidence of emphysematous changes  Office Spirometry Results:  Normal spirometry, normal flow volume loops  FEV1: 4 19 liters (99%)  FVC: 5 4 liters (101%)  FEV1/FVC: 77 6 %     Ct Chest Wo Contrast    Result Date: 2/6/2018  Narrative: CT CHEST WITHOUT IV CONTRAST INDICATION:  Difficulty breathing, especially at night COMPARISON: None  TECHNIQUE: CT examination of the chest was performed without intravenous contrast   Reformatted images were created in axial, sagittal, and coronal planes  Radiation dose length product (DLP) for this visit:  256 mGy-cm   This examination, like all CT scans performed in the [facility], was performed utilizing techniques to minimize radiation dose exposure, including the use of iterative reconstruction and automated exposure control  FINDINGS: LUNGS:  There is a triangular-shaped nodule along the left major fissure consistent with intrapulmonary lymph node  There is an additional parenchymal nodule in the peripheral left lower lobe measuring 2 mm on series 3, image 30  There is no tracheal or endobronchial lesion  There is mild atelectasis in the left lower lobe and lingula  PLEURA:  Unremarkable  HEART/GREAT VESSELS:  Unremarkable for patient's age  MEDIASTINUM AND MONICA:  Unremarkable  CHEST WALL AND LOWER NECK: Normal  VISUALIZED STRUCTURES IN THE UPPER ABDOMEN:  Unremarkable  OSSEOUS STRUCTURES:  No acute fracture  No destructive osseous lesion  Impression: 1  No acute abnormality  Mild lingular and left lower lobe atelectasis  2   2 mm left lower lobe nodule   Based on current Fleischner Society 2017 Guidelines on incidental pulmonary nodule, no routine follow-up is needed if the patient is considered low risk for lung cancer  If the patient is considered high risk for lung cancer, 12 month follow-up non-contrast chest CT is recommended   Workstation performed: [id number]

## 2018-02-15 NOTE — ASSESSMENT & PLAN NOTE
Patient is currently improving with prednisone and nebulizer therapy however he is unable to use the nebulizer as prescribed as it does make him jittery  Patient does have coarse breath sounds and therefore I do believe he would benefit from inhaled steroid  Will start him on Breo  Sample is given to him today  Continue with prednisone  He may use the nebulizer once daily in the evening  Spirometry shows no evidence of obstruction  This is likely virally mediated versus sinus related  Once he is improved he will require repeat ENT evaluation  He is advised to use a mask when he has any occupational exposure as this will help in the healing of his bronchitis  Otherwise continued exposure may make things worse

## 2018-02-15 NOTE — LETTER
February 15, 2018     Brant Mendez MD  [address]  [address] [place] [id number]    Patient: Richar Womack   YOB: 1975   Date of Visit: 2/15/2018       Dear Dr Catalina Borden:    Thank you for referring Eduin Client to me for evaluation  Below are my notes for this consultation  If you have questions, please do not hesitate to call me  I look forward to following your patient along with you  Sincerely,        Osiris Carl MD        CC: No Recipients  Osiris Carl MD  2/15/2018  1:14 PM  Sign at close encounter  Assessment/Plan:       Problem List Items Addressed This Visit        Digestive    Acid reflux disease     This is controlled  However he is advised to take prednisone with food and he will let me know if heartburn worsens with prednisone  Respiratory    Subacute bronchitis     Patient is currently improving with prednisone and nebulizer therapy however he is unable to use the nebulizer as prescribed as it does make him jittery  Patient does have coarse breath sounds and therefore I do believe he would benefit from inhaled steroid  Will start him on Breo  Sample is given to him today  Continue with prednisone  He may use the nebulizer once daily in the evening  Spirometry shows no evidence of obstruction  This is likely virally mediated versus sinus related  Once he is improved he will require repeat ENT evaluation  Relevant Medications    albuterol (ACCUNEB) 0 63 MG/3ML nebulizer solution       Other    Pulmonary nodule     Pulmonary nodule which has been stable  However prior CT images are not available for review  They state that he did have his CT scans here at [facility] will need to go to Radiology were department and review  However these are very small in based on Fleischner criteria they do not require any further follow-up  Imaging is reviewed with the patient and his wife today    Patient's wife request report and this is printed for her today  Other Visit Diagnoses     Shortness of breath    -  Primary    Relevant Medications    albuterol (ACCUNEB) 0 63 MG/3ML nebulizer solution    Other Relevant Orders    POCT spirometry            Return in about 2 weeks (around 3/1/2018)  All questions are answered to the patient's satisfaction and understanding  He verbalizes understanding  He is encouraged to call with any further questions or concerns  Portions of the record may have been created with voice recognition software  Occasional wrong word or "sound a like" substitutions may have occurred due to the inherent limitations of voice recognition software  Read the chart carefully and recognize, using context, where substitutions have occurred  ______________________________________________________________________    Chief Complaint:   Chief Complaint   Patient presents with    Shortness of Breath     intermittant mostly at night    Bronchitis     Since beginning of December 2017 had 2 rounds of antibiotics     Cough     dry deep occasionally productive        Patient ID: Yuliya Jang is a 44 y o  y o  male has a past medical history of Back pain and GERD (gastroesophageal reflux disease)  2/15/2018  Patient presents today for initial pulmonary evaluation for persistent bronchitis for approximately 2 months now  He presents today with his wife  His illness started with acute sinusitis which he initially tried to medicate with Sudafed and other over-the-counter medications however this did not resolve and therefore he saw his primary care physician toward the end of December and was prescribed with antibiotics at that time  He was also prescribed with Combivent at that time because he had complaints of wheezing  His symptoms did improve in between but gradually got worse again with complaints of cough, congestion, sinus problems, wheezing, difficulty taking a deep breath  No fevers    After seeing his primary care physician CT chest was obtained and showed no pneumonia patient did have intrapulmonary lymph node small left lung nodule approximately 2 mm and multiple tiny right peripheral nodules  Patient did have evidence of atelectasis  He is subsequently prescribed with nebulizer DuoNeb and antibiotics at the beginning of this month and he is just now finishing those antibiotics  He was also prescribed with Medrol Dosepak however that did not help  He most recently because of persistence of symptoms he is prescribed with prednisone 40 mg and taper  His breathing and cough is improved on the prednisone  Patient has not taken any time off since developing these symptoms  He does work as a  and is around dust and fumes all the time  He also works around [employer] and fumes associated with this  States that he does wear a mask when he is around the plastics but does not wear a mask any other time  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  Blood pressure is elevated today  Patient states that his blood pressure is never elevated  Possibly in relation to steroids  Steroids are also causing him to be agitated and irritated  When he takes it in the evening it prevents him from falling asleep  His Morgan Sleepiness Scale is 20/24 today  He does have snoring  Unknown witnessed apneas or choking and gasping in his sleep  He has never had a sleep study in the past       Breathing Problem   He complains of chest tightness, cough, difficulty breathing, shortness of breath (Does not feel like he is back to his baseline) and wheezing  There is no frequent throat clearing, hemoptysis, hoarse voice or sputum production  This is a recurrent problem  The current episode started more than 1 month ago  The problem occurs constantly  The problem has been gradually improving  The cough is non-productive (Initially productive)   Associated symptoms include chest pain ( patient had chest tightness), dyspnea on exertion, myalgias (This is not new in relation to underlying work ), nasal congestion and postnasal drip ( patient has chronic nasal issues and has seen an ENT physician in the past )  Pertinent negatives include no appetite change, ear congestion, ear pain, fever, headaches, heartburn ( patient has a history of heartburn and takes Protonix for this and heartburn is controlled ), malaise/fatigue, orthopnea, PND, rhinorrhea, sneezing, sore throat, sweats, trouble swallowing or weight loss  Exacerbated by: Nothing known  His symptoms are alleviated by beta-agonist and oral steroids  He reports moderate improvement on treatment  Risk factors for lung disease include occupational exposure  Occupational/Exposure history: works as a - see above  Travel history: no recent travel    Review of Systems   Constitutional: Negative for appetite change, fever, malaise/fatigue and weight loss  HENT: Positive for postnasal drip ( patient has chronic nasal issues and has seen an ENT physician in the past )  Negative for ear pain, hoarse voice, rhinorrhea, sneezing, sore throat and trouble swallowing  Eyes: Negative for visual disturbance  Respiratory: Positive for cough, shortness of breath (Does not feel like he is back to his baseline) and wheezing  Negative for hemoptysis and sputum production  Cardiovascular: Positive for chest pain ( patient had chest tightness) and dyspnea on exertion  Negative for palpitations, leg swelling and PND  Gastrointestinal: Negative for abdominal distention, abdominal pain, diarrhea, heartburn ( patient has a history of heartburn and takes Protonix for this and heartburn is controlled ), nausea and vomiting  Endocrine: Negative for cold intolerance  Genitourinary: Negative for difficulty urinating  Musculoskeletal: Positive for myalgias (This is not new in relation to underlying work  )  Negative for arthralgias and back pain  Skin: Negative for color change and pallor  Allergic/Immunologic: Negative for environmental allergies and food allergies  Neurological: Negative for dizziness, numbness and headaches  Hematological: Negative for adenopathy  Psychiatric/Behavioral: Negative for agitation, behavioral problems and confusion  Social history: He reports that he has never smoked  He has never used smokeless tobacco  He reports that he does not drink alcohol or use drugs  Past surgical history:   Past Surgical History:   Procedure Laterality Date    APPENDECTOMY      CHOLECYSTECTOMY      HERNIA REPAIR      NC EXC SKIN BENIG 1 1-2 CM TRUNK,ARM,LEG Right 1/20/2016    Procedure: REVISION SCAR ABDOMINAL Exploration of painful scar on right upper abdomen;  Surgeon: Logan Hernandez MD;  Location: [address] [place] [address];  Service: General    [address] [place] [address] N/A 1/20/2016    Procedure: REPAIR HERNIA VENTRAL (SMALL EPIGASTRIC HERNIA); Surgeon: Logan Hernandez MD;  Location: [facility];  Service: General     Family history:   Family History   Problem Relation Age of Onset    Cancer Mother     Hypertension Mother     Arthritis Mother     Hypertension Father     Hiatal hernia Father     Arthritis Father     Cancer Maternal Uncle          There is no immunization history on file for this patient  Current Outpatient Prescriptions   Medication Sig Dispense Refill    Ibuprofen (ADVIL) 200 MG CAPS Take 1 tablet by mouth 2 (two) times a day   pantoprazole (PROTONIX) 40 mg tablet Take 1 tablet (40 mg total) by mouth daily 30 tablet 3    predniSONE 10 mg tablet Take 1 tablet (10 mg total) by mouth daily for 16 days Take 4 daily for 4 days, then 3 daily for 4 days, then 2 daily for 4 days, then 1 daily for 4 days  Take with food   40 tablet 0    albuterol (ACCUNEB) 0 63 MG/3ML nebulizer solution Take 3 mL (0 63 mg total) by nebulization every 6 (six) hours as needed for wheezing 75 mL 0     Current Facility-Administered Medications   Medication Dose Route Frequency Provider Last Rate Last Dose    ipratropium-albuterol (DUO-NEB) 0 5-2 5 mg/3 mL inhalation solution 3 mL  3 mL Nebulization Q6H Warren Pond MD   3 mL at 02/01/18 1922     Allergies: Levaquin [levofloxacin] and Rosuvastatin    Objective:  Vitals:    02/15/18 1042   BP: 158/98   BP Location: Left arm   Patient Position: Sitting   Cuff Size: Standard   Pulse: 74   Resp: 18   Temp: 98 5 °F (36 9 °C)   TempSrc: Oral   SpO2: 94%   Weight: 97 1 kg (214 lb)   Height: 5' 10 5" (1 791 m)   Oxygen Therapy  SpO2: 94 %    Wt Readings from Last 3 Encounters:   02/15/18 97 1 kg (214 lb)   02/01/18 96 6 kg (213 lb)   12/30/17 95 3 kg (210 lb)     Body mass index is 30 27 kg/m²  Physical Exam   Constitutional: He is oriented to person, place, and time  He appears well-developed and well-nourished  No distress  HENT:   Head: Normocephalic and atraumatic  Mouth/Throat: Oropharyngeal exudate (erythema) present  Mallampati 2   Eyes: EOM are normal  Pupils are equal, round, and reactive to light  Neck: Normal range of motion  Neck supple  No tracheal deviation present  No thyromegaly present  Cardiovascular: Normal rate and regular rhythm  No murmur heard  Pulmonary/Chest: Effort normal  No respiratory distress  He has no wheezes  He has no rales  He exhibits no tenderness  Coarse breath sounds bilaterally   Abdominal: Soft  Bowel sounds are normal  He exhibits no distension  There is no tenderness  Musculoskeletal: Normal range of motion  He exhibits no edema  Lymphadenopathy:     He has no cervical adenopathy  Neurological: He is alert and oriented to person, place, and time  No cranial nerve deficit  Skin: Skin is warm and dry  He is not diaphoretic  Psychiatric: He has a normal mood and affect  His behavior is normal    Vitals reviewed        Lab Review:   Lab Results   Component Value Date     05/22/2017    K 4 1 05/22/2017     05/22/2017    CO2 32 05/22/2017    BUN 18 05/22/2017    CREATININE 0 90 05/22/2017    GLUCOSE 92 02/27/2016    CALCIUM 8 8 05/22/2017     Lab Results   Component Value Date    WBC 5 80 05/22/2017    HGB 14 7 05/22/2017    HCT 44 4 05/22/2017    MCV 82 05/22/2017     05/22/2017       Diagnostics:  I have personally reviewed pertinent reports  and I have personally reviewed pertinent films in PACS  CT chest 2/6/2018 as visualized by me shows atelectatic changes and nodules are also visualized  No evidence of emphysematous changes  Office Spirometry Results:  Normal spirometry, normal flow volume loops  FEV1: 4 19 liters (99%)  FVC: 5 4 liters (101%)  FEV1/FVC: 77 6 %     Ct Chest Wo Contrast    Result Date: 2/6/2018  Narrative: CT CHEST WITHOUT IV CONTRAST INDICATION:  Difficulty breathing, especially at night COMPARISON: None  TECHNIQUE: CT examination of the chest was performed without intravenous contrast   Reformatted images were created in axial, sagittal, and coronal planes  Radiation dose length product (DLP) for this visit:  256 mGy-cm   This examination, like all CT scans performed in the [facility], was performed utilizing techniques to minimize radiation dose exposure, including the use of iterative reconstruction and automated exposure control  FINDINGS: LUNGS:  There is a triangular-shaped nodule along the left major fissure consistent with intrapulmonary lymph node  There is an additional parenchymal nodule in the peripheral left lower lobe measuring 2 mm on series 3, image 30  There is no tracheal or endobronchial lesion  There is mild atelectasis in the left lower lobe and lingula  PLEURA:  Unremarkable  HEART/GREAT VESSELS:  Unremarkable for patient's age  MEDIASTINUM AND MONICA:  Unremarkable  CHEST WALL AND LOWER NECK: Normal  VISUALIZED STRUCTURES IN THE UPPER ABDOMEN:  Unremarkable  OSSEOUS STRUCTURES:  No acute fracture  No destructive osseous lesion  Impression: 1  No acute abnormality    Mild lingular and left lower lobe atelectasis  2   2 mm left lower lobe nodule  Based on current Fleischner Society 2017 Guidelines on incidental pulmonary nodule, no routine follow-up is needed if the patient is considered low risk for lung cancer  If the patient is considered high risk for lung cancer, 12 month follow-up non-contrast chest CT is recommended   Workstation performed: [id number]

## 2018-02-15 NOTE — ASSESSMENT & PLAN NOTE
This is controlled  However he is advised to take prednisone with food and he will let me know if heartburn worsens with prednisone

## 2018-03-02 ENCOUNTER — OFFICE VISIT (OUTPATIENT)
Dept: PULMONOLOGY | Facility: MEDICAL CENTER | Age: 43
End: 2018-03-02
Payer: COMMERCIAL

## 2018-03-02 VITALS
BODY MASS INDEX: 30.52 KG/M2 | HEART RATE: 69 BPM | WEIGHT: 218 LBS | DIASTOLIC BLOOD PRESSURE: 84 MMHG | SYSTOLIC BLOOD PRESSURE: 142 MMHG | HEIGHT: 71 IN | RESPIRATION RATE: 12 BRPM | OXYGEN SATURATION: 97 % | TEMPERATURE: 98.7 F

## 2018-03-02 DIAGNOSIS — J20.9 SUBACUTE BRONCHITIS: ICD-10-CM

## 2018-03-02 DIAGNOSIS — J20.9 ACUTE BRONCHITIS, UNSPECIFIED ORGANISM: ICD-10-CM

## 2018-03-02 DIAGNOSIS — R09.82 POST-NASAL DRIP: Primary | ICD-10-CM

## 2018-03-02 DIAGNOSIS — R91.1 PULMONARY NODULE: ICD-10-CM

## 2018-03-02 PROCEDURE — 99214 OFFICE O/P EST MOD 30 MIN: CPT | Performed by: INTERNAL MEDICINE

## 2018-03-02 RX ORDER — FLUTICASONE FUROATE AND VILANTEROL 100; 25 UG/1; UG/1
1 POWDER RESPIRATORY (INHALATION) DAILY
Qty: 1 EACH | Refills: 0 | Status: SHIPPED | OUTPATIENT
Start: 2018-03-02 | End: 2018-10-04 | Stop reason: ALTCHOICE

## 2018-03-02 RX ORDER — FLUTICASONE FUROATE AND VILANTEROL 100; 25 UG/1; UG/1
1 POWDER RESPIRATORY (INHALATION) DAILY
Qty: 1 EACH | Refills: 0 | Status: SHIPPED | OUTPATIENT
Start: 2018-03-02 | End: 2018-03-02 | Stop reason: SDUPTHER

## 2018-03-02 RX ORDER — AZELASTINE 1 MG/ML
1 SPRAY, METERED NASAL DAILY
Qty: 30 ML | Refills: 0 | Status: SHIPPED | OUTPATIENT
Start: 2018-03-02 | End: 2018-04-09

## 2018-03-02 NOTE — LETTER
March 2, 2018     Jeanie Fitzgerald MD  [address]  [address] [place] [address]    Patient: Ez Baer   YOB: 1975   Date of Visit: 3/2/2018       Dear Dr Ken Gandhi:    Thank you for referring Melia Callejas to me for evaluation  Below are my notes for this consultation  If you have questions, please do not hesitate to call me  I look forward to following your patient along with you  Sincerely,        Ludmila Dunlap MD        CC: No Recipients  Ludmila Dunlap MD  3/2/2018  1:02 PM  Sign at close encounter  Assessment/Plan:     Problem List Items Addressed This Visit        Respiratory    Acute bronchitis    Relevant Medications    azelastine (ASTELIN) 0 1 % nasal spray    fluticasone furoate-vilanterol (BREO ELLIPTA)    Subacute bronchitis     Patient is much improved  Continue with Breo but dose is decreased to 100 mcg today  Use nebulizer as needed  Strongly consider repeat ENT evaluation  He is advised to use a mask when he has any occupational exposure as this will help in the healing of his bronchitis  Otherwise continued exposure may make things worse  Relevant Medications    azelastine (ASTELIN) 0 1 % nasal spray    fluticasone furoate-vilanterol (BREO ELLIPTA)       Other    Pulmonary nodule    Post-nasal drip - Primary     Uncontrolled  Start Astelin in addition to Flonase         Relevant Medications    azelastine (ASTELIN) 0 1 % nasal spray            Return in about 1 month (around 4/2/2018)  All questions are answered to the patient's satisfaction and understanding  He verbalizes understanding  He is encouraged to call with any further questions or concerns  Portions of the record may have been created with voice recognition software  Occasional wrong word or "sound a like" substitutions may have occurred due to the inherent limitations of voice recognition software    Read the chart carefully and recognize, using context, where substitutions have occurred  ______________________________________________________________________    Chief Complaint:   Chief Complaint   Patient presents with    Follow-up     symptoms are starting to get better    Shortness of Breath     very little    Cough     non producitve       Patient ID: Stoney Culver is a 44 y o  y o  male has a past medical history of Back pain and GERD (gastroesophageal reflux disease)  3/2/2018  Finished prednisone yesterday  Patient presents for follow-up visit  He is using the inhaler  Using the nebulizer every night  Not using the rescue inhaler  He is not consistent with using his mask  Cough is improved  He does get occasionally short of breath and this happens at night prior to going to sleep  He has been sleeping well  Denies any joint pains  GERD is controlled  He has chronic back pain for which he takes ibuprofen on a daily basis  Still has nasal congestion  Takes Flonase for this  No fevers chills or night sweats  No further wheezing  Review of Systems   All other systems reviewed and are negative  Smoking history: He reports that he has never smoked  He has never used smokeless tobacco     The following portions of the patient's history were reviewed and updated as appropriate: allergies, current medications, past family history, past medical history, past social history, past surgical history and problem list       There is no immunization history on file for this patient  Current Outpatient Prescriptions   Medication Sig Dispense Refill    albuterol (ACCUNEB) 0 63 MG/3ML nebulizer solution Take 3 mL (0 63 mg total) by nebulization every 6 (six) hours as needed for wheezing 75 mL 0    Ibuprofen (ADVIL) 200 MG CAPS Take 1 tablet by mouth 2 (two) times a day   pantoprazole (PROTONIX) 40 mg tablet Take 1 tablet (40 mg total) by mouth daily 30 tablet 3     No current facility-administered medications for this visit        Allergies: Levaquin [levofloxacin] and Rosuvastatin    Objective:  Vitals:    03/02/18 0813   BP: 142/84   BP Location: Right arm   Patient Position: Sitting   Cuff Size: Adult   Pulse: 69   Resp: 12   Temp: 98 7 °F (37 1 °C)   TempSrc: Oral   SpO2: 97%   Weight: 98 9 kg (218 lb)   Height: 5' 11" (1 803 m)   Oxygen Therapy  SpO2: 97 %    Wt Readings from Last 3 Encounters:   03/02/18 98 9 kg (218 lb)   02/15/18 97 1 kg (214 lb)   02/01/18 96 6 kg (213 lb)     Body mass index is 30 4 kg/m²  Physical Exam   Constitutional: He is oriented to person, place, and time  He appears well-developed and well-nourished  No distress  HENT:   Head: Normocephalic and atraumatic  Mouth/Throat: Oropharynx is clear and moist  No oropharyngeal exudate  Eyes: EOM are normal  Pupils are equal, round, and reactive to light  Neck: Normal range of motion  Neck supple  No tracheal deviation present  No thyromegaly present  Cardiovascular: Normal rate and regular rhythm  No murmur heard  Pulmonary/Chest: Effort normal  No respiratory distress  He has no wheezes  He has no rales  He exhibits no tenderness  Improved breath sounds  Much less coarse   Abdominal: Soft  Bowel sounds are normal  He exhibits no distension  There is no tenderness  Musculoskeletal: Normal range of motion  He exhibits no edema  Lymphadenopathy:     He has no cervical adenopathy  Neurological: He is alert and oriented to person, place, and time  No cranial nerve deficit  Skin: Skin is warm and dry  He is not diaphoretic  Psychiatric: He has a normal mood and affect  His behavior is normal    Vitals reviewed  Lab Review:   not applicable    Diagnostics:   No new imaging

## 2018-03-02 NOTE — PROGRESS NOTES
Assessment/Plan:     Problem List Items Addressed This Visit        Respiratory    Acute bronchitis    Relevant Medications    azelastine (ASTELIN) 0 1 % nasal spray    fluticasone furoate-vilanterol (BREO ELLIPTA)    Subacute bronchitis     Patient is much improved  Continue with Breo but dose is decreased to 100 mcg today  Use nebulizer as needed  Strongly consider repeat ENT evaluation  He is advised to use a mask when he has any occupational exposure as this will help in the healing of his bronchitis  Otherwise continued exposure may make things worse  Relevant Medications    azelastine (ASTELIN) 0 1 % nasal spray    fluticasone furoate-vilanterol (BREO ELLIPTA)       Other    Pulmonary nodule    Post-nasal drip - Primary     Uncontrolled  Start Astelin in addition to Flonase         Relevant Medications    azelastine (ASTELIN) 0 1 % nasal spray            Return in about 1 month (around 4/2/2018)  All questions are answered to the patient's satisfaction and understanding  He verbalizes understanding  He is encouraged to call with any further questions or concerns  Portions of the record may have been created with voice recognition software  Occasional wrong word or "sound a like" substitutions may have occurred due to the inherent limitations of voice recognition software  Read the chart carefully and recognize, using context, where substitutions have occurred  ______________________________________________________________________    Chief Complaint:   Chief Complaint   Patient presents with    Follow-up     symptoms are starting to get better    Shortness of Breath     very little    Cough     non producitve       Patient ID: Fidelina Jc is a 44 y o  y o  male has a past medical history of Back pain and GERD (gastroesophageal reflux disease)  3/2/2018  Finished prednisone yesterday  Patient presents for follow-up visit  He is using the inhaler  Using the nebulizer every night  Not using the rescue inhaler  He is not consistent with using his mask  Cough is improved  He does get occasionally short of breath and this happens at night prior to going to sleep  He has been sleeping well  Denies any joint pains  GERD is controlled  He has chronic back pain for which he takes ibuprofen on a daily basis  Still has nasal congestion  Takes Flonase for this  No fevers chills or night sweats  No further wheezing  Review of Systems   All other systems reviewed and are negative  Smoking history: He reports that he has never smoked  He has never used smokeless tobacco     The following portions of the patient's history were reviewed and updated as appropriate: allergies, current medications, past family history, past medical history, past social history, past surgical history and problem list       There is no immunization history on file for this patient  Current Outpatient Prescriptions   Medication Sig Dispense Refill    albuterol (ACCUNEB) 0 63 MG/3ML nebulizer solution Take 3 mL (0 63 mg total) by nebulization every 6 (six) hours as needed for wheezing 75 mL 0    Ibuprofen (ADVIL) 200 MG CAPS Take 1 tablet by mouth 2 (two) times a day   pantoprazole (PROTONIX) 40 mg tablet Take 1 tablet (40 mg total) by mouth daily 30 tablet 3     No current facility-administered medications for this visit  Allergies: Levaquin [levofloxacin] and Rosuvastatin    Objective:  Vitals:    03/02/18 0813   BP: 142/84   BP Location: Right arm   Patient Position: Sitting   Cuff Size: Adult   Pulse: 69   Resp: 12   Temp: 98 7 °F (37 1 °C)   TempSrc: Oral   SpO2: 97%   Weight: 98 9 kg (218 lb)   Height: 5' 11" (1 803 m)   Oxygen Therapy  SpO2: 97 %    Wt Readings from Last 3 Encounters:   03/02/18 98 9 kg (218 lb)   02/15/18 97 1 kg (214 lb)   02/01/18 96 6 kg (213 lb)     Body mass index is 30 4 kg/m²  Physical Exam   Constitutional: He is oriented to person, place, and time   He appears well-developed and well-nourished  No distress  HENT:   Head: Normocephalic and atraumatic  Mouth/Throat: Oropharynx is clear and moist  No oropharyngeal exudate  Eyes: EOM are normal  Pupils are equal, round, and reactive to light  Neck: Normal range of motion  Neck supple  No tracheal deviation present  No thyromegaly present  Cardiovascular: Normal rate and regular rhythm  No murmur heard  Pulmonary/Chest: Effort normal  No respiratory distress  He has no wheezes  He has no rales  He exhibits no tenderness  Improved breath sounds  Much less coarse   Abdominal: Soft  Bowel sounds are normal  He exhibits no distension  There is no tenderness  Musculoskeletal: Normal range of motion  He exhibits no edema  Lymphadenopathy:     He has no cervical adenopathy  Neurological: He is alert and oriented to person, place, and time  No cranial nerve deficit  Skin: Skin is warm and dry  He is not diaphoretic  Psychiatric: He has a normal mood and affect  His behavior is normal    Vitals reviewed  Lab Review:   not applicable    Diagnostics:   No new imaging

## 2018-04-02 DIAGNOSIS — K21.9 GASTROESOPHAGEAL REFLUX DISEASE, ESOPHAGITIS PRESENCE NOT SPECIFIED: ICD-10-CM

## 2018-04-02 RX ORDER — PANTOPRAZOLE SODIUM 40 MG/1
TABLET, DELAYED RELEASE ORAL
Qty: 30 TABLET | Refills: 5 | Status: SHIPPED | OUTPATIENT
Start: 2018-04-02 | End: 2020-06-10

## 2018-04-09 ENCOUNTER — OFFICE VISIT (OUTPATIENT)
Dept: FAMILY MEDICINE CLINIC | Facility: CLINIC | Age: 43
End: 2018-04-09
Payer: COMMERCIAL

## 2018-04-09 ENCOUNTER — TELEPHONE (OUTPATIENT)
Dept: FAMILY MEDICINE CLINIC | Facility: CLINIC | Age: 43
End: 2018-04-09

## 2018-04-09 VITALS
WEIGHT: 221 LBS | HEART RATE: 72 BPM | BODY MASS INDEX: 30.94 KG/M2 | HEIGHT: 71 IN | RESPIRATION RATE: 20 BRPM | SYSTOLIC BLOOD PRESSURE: 130 MMHG | DIASTOLIC BLOOD PRESSURE: 86 MMHG | TEMPERATURE: 97.6 F

## 2018-04-09 DIAGNOSIS — S16.1XXA CERVICAL MYOFASCIAL STRAIN, INITIAL ENCOUNTER: Primary | ICD-10-CM

## 2018-04-09 PROCEDURE — 99213 OFFICE O/P EST LOW 20 MIN: CPT | Performed by: NURSE PRACTITIONER

## 2018-04-09 PROCEDURE — 3008F BODY MASS INDEX DOCD: CPT | Performed by: NURSE PRACTITIONER

## 2018-04-09 RX ORDER — FLUTICASONE PROPIONATE 50 MCG
1 SPRAY, SUSPENSION (ML) NASAL DAILY
COMMUNITY

## 2018-04-09 RX ORDER — CYCLOBENZAPRINE HCL 10 MG
10 TABLET ORAL
Qty: 20 TABLET | Refills: 0 | Status: SHIPPED | OUTPATIENT
Start: 2018-04-09 | End: 2018-10-04 | Stop reason: ALTCHOICE

## 2018-04-09 NOTE — LETTER
April 9, 2018     Patient: Edis Shannon   YOB: 1975   Date of Visit: 4/9/2018       To Whom it May Concern:    Phoebe Stacyr is under my professional care  He was seen in my office on 4/9/2018  Please excuse from work 4/9/18    If you have any questions or concerns, please don't hesitate to call           Sincerely,          DIAMOND Byers        CC: No Recipients

## 2018-04-09 NOTE — PROGRESS NOTES
Assessment/Plan:    Moist heat and muscle relaxer as directed  Recommended extra strength Tylenol  Consider massage therapy if symptoms persist    Due for CPE, he will schedule this  Problem List Items Addressed This Visit     None      Visit Diagnoses     Cervical myofascial strain, initial encounter    -  Primary    Relevant Medications    cyclobenzaprine (FLEXERIL) 10 mg tablet          There are no Patient Instructions on file for this visit  Return for Annual physical     Subjective:      Patient ID: Rita Regan is a 44 y o  male  Chief Complaint   Patient presents with    Headache     States that his BP has been running high recently as well [employer]       He has been getting headaches for the past couple of months  His neck has been bothering him  This morning when he woke up from work he had severe pain in the back of his neck, and had difficulty moving it  Pain radiated into base of his head  Feels like his eyes can't focus and his vision is hazy  Felt nauseas with the headache today  Pain described as throbbing, was 7/10 at most severe, currently 2/10  Took 600mg at symptom onset and then 2 Excedrin at 8am   Headache improved  Works as a  and is constantly straining his neck  Has popping sensation in the left side of his neck when he turns his head to the right  The following portions of the patient's history were reviewed and updated as appropriate: allergies, current medications, past family history, past medical history, past social history, past surgical history and problem list     Review of Systems   Constitutional: Negative  Eyes: Positive for visual disturbance  Cardiovascular: Negative for chest pain and palpitations  Musculoskeletal: Positive for myalgias  Neurological: Positive for headaches  Negative for dizziness, syncope and weakness  All other systems reviewed and are negative          Current Outpatient Prescriptions   Medication Sig Dispense Refill    albuterol (ACCUNEB) 0 63 MG/3ML nebulizer solution Take 3 mL (0 63 mg total) by nebulization every 6 (six) hours as needed for wheezing 75 mL 0    fluticasone (FLONASE) 50 mcg/act nasal spray 1 spray into each nostril daily      fluticasone furoate-vilanterol (BREO ELLIPTA) Inhale 1 puff daily 1 each 0    Ibuprofen (ADVIL) 200 MG CAPS Take 1 tablet by mouth 2 (two) times a day   pantoprazole (PROTONIX) 40 mg tablet TAKE ONE TABLET BY MOUTH EVERY DAY 30 tablet 5    cyclobenzaprine (FLEXERIL) 10 mg tablet Take 1 tablet (10 mg total) by mouth daily at bedtime 20 tablet 0     No current facility-administered medications for this visit  Objective:    /86   Pulse 72   Temp 97 6 °F (36 4 °C)   Resp 20   Ht 5' 11" (1 803 m)   Wt 100 kg (221 lb)   BMI 30 82 kg/m²        Physical Exam   Constitutional: He appears well-developed and well-nourished  HENT:   Right Ear: Tympanic membrane, external ear and ear canal normal    Left Ear: Tympanic membrane, external ear and ear canal normal    Nose: No mucosal edema  Mouth/Throat: Oropharynx is clear and moist and mucous membranes are normal    Eyes: Conjunctivae are normal  Pupils are equal, round, and reactive to light  Right eye exhibits no nystagmus  Cardiovascular: Normal rate, regular rhythm and normal heart sounds  Pulmonary/Chest: Effort normal and breath sounds normal    Abdominal: Bowel sounds are normal  He exhibits no distension  There is no splenomegaly or hepatomegaly  There is no tenderness  Musculoskeletal:   Full ROM of neck including flexion and extension  Discomfort with right rotation of neck  Lymphadenopathy:        Right cervical: No superficial cervical adenopathy present  Left cervical: No superficial cervical adenopathy present  Neurological: He has normal strength  He displays a negative Romberg sign  Coordination and gait normal    Skin: No rash noted     Psychiatric: He has a normal mood and affect  Nursing note and vitals reviewed               Roel Mae

## 2018-07-03 DIAGNOSIS — K21.9 GASTROESOPHAGEAL REFLUX DISEASE, ESOPHAGITIS PRESENCE NOT SPECIFIED: ICD-10-CM

## 2018-07-03 RX ORDER — PANTOPRAZOLE SODIUM 40 MG/1
TABLET, DELAYED RELEASE ORAL
Qty: 30 TABLET | Refills: 3 | Status: SHIPPED | OUTPATIENT
Start: 2018-07-03 | End: 2018-10-04 | Stop reason: ALTCHOICE

## 2018-10-04 ENCOUNTER — OFFICE VISIT (OUTPATIENT)
Dept: FAMILY MEDICINE CLINIC | Facility: CLINIC | Age: 43
End: 2018-10-04
Payer: COMMERCIAL

## 2018-10-04 VITALS
RESPIRATION RATE: 14 BRPM | BODY MASS INDEX: 31.09 KG/M2 | TEMPERATURE: 97.6 F | WEIGHT: 222.1 LBS | DIASTOLIC BLOOD PRESSURE: 102 MMHG | SYSTOLIC BLOOD PRESSURE: 152 MMHG | HEIGHT: 71 IN | HEART RATE: 76 BPM

## 2018-10-04 DIAGNOSIS — M79.10 MYALGIA: ICD-10-CM

## 2018-10-04 DIAGNOSIS — R53.83 FATIGUE, UNSPECIFIED TYPE: ICD-10-CM

## 2018-10-04 DIAGNOSIS — E78.5 HYPERLIPIDEMIA, UNSPECIFIED HYPERLIPIDEMIA TYPE: ICD-10-CM

## 2018-10-04 DIAGNOSIS — M25.50 ARTHRALGIA, UNSPECIFIED JOINT: ICD-10-CM

## 2018-10-04 DIAGNOSIS — R91.1 PULMONARY NODULE: ICD-10-CM

## 2018-10-04 DIAGNOSIS — K21.9 GASTROESOPHAGEAL REFLUX DISEASE, ESOPHAGITIS PRESENCE NOT SPECIFIED: ICD-10-CM

## 2018-10-04 DIAGNOSIS — I10 ESSENTIAL HYPERTENSION: Primary | ICD-10-CM

## 2018-10-04 PROCEDURE — 99214 OFFICE O/P EST MOD 30 MIN: CPT | Performed by: NURSE PRACTITIONER

## 2018-10-04 RX ORDER — LOSARTAN POTASSIUM 50 MG/1
50 TABLET ORAL DAILY
Qty: 30 TABLET | Refills: 1 | Status: SHIPPED | OUTPATIENT
Start: 2018-10-04 | End: 2018-11-29 | Stop reason: SDUPTHER

## 2018-10-04 NOTE — PATIENT INSTRUCTIONS
Check Bp twice a day and bring readings  Supportive care discussed and advised  Follow up for no improvement and worsening of conditions  Patient advised and educated when to see immediate medical care  Hypertension   WHAT YOU NEED TO KNOW:   What is hypertension? Hypertension is high blood pressure (BP)  Your BP is the force of your blood moving against the walls of your arteries  Normal BP is less than 120/80  Prehypertension is between 120/80 and 139/89  Hypertension is 140/90 or higher  Hypertension causes your BP to get so high that your heart has to work much harder than normal  This can damage your heart  You can control hypertension with a healthy lifestyle or medicines  A controlled blood pressure helps protect your organs, such as your heart, lungs, brain, and kidneys  What causes hypertension? The cause of hypertension may not be known  This type of hypertension is called essential or primary hypertension  Hypertension can sometimes be caused by other medical conditions, such as kidney disease  This type of hypertension is called secondary hypertension  What increases my risk for hypertension? · Age older than 54 years (men)    · Age older than 72 years (women)    · A family history of hypertension or heart disease     · Obesity or lack of exercise     · Too many high-sodium foods    · Stress     · Use of tobacco, alcohol, or illegal drugs     · A medical condition, such as diabetes, kidney disease, thyroid disease, or adrenal gland disorder     · Certain medicines, such as steroids or birth control pills  What are the signs and symptoms of hypertension? You may have no signs or symptoms, or you may have any of the following:  · Headache     · Blurred vision     · Chest pain     · Dizziness or weakness     · Trouble breathing    · Nosebleeds  How is hypertension diagnosed? Your healthcare provider will ask about your symptoms and the medicines you take   He or she will also ask if you have a family history of high blood pressure and about any health conditions you have  He or she will also check your blood pressure and weight and examine your heart, lungs, and eyes  You may need any of the following tests:  · Blood tests  may help healthcare providers find the cause of your hypertension  Blood tests can also help find other health problems caused by hypertension  · Urine tests  will be done to check your kidney function  Kidney problems can increase your risk for hypertension  How is hypertension treated? Your healthcare provider will recommend lifestyle changes to lower your BP  You may also need the following medicines:  · Medicine  may be used to help lower your BP  You may need more than one type of medicine  Take the medicine exactly as directed  · Diuretics  help decrease extra fluid that collects in your body  This will help lower your BP  You may urinate more often while you take this medicine  · Cholesterol medicine  helps lower your cholesterol level  A low cholesterol level helps prevent heart disease and makes it easier to control your blood pressure  What can I do to manage hypertension? Talk with your healthcare provider about these and other ways to manage hypertension:  · Check your BP at home  Sit and rest for 5 minutes before you take your BP  Extend your arm and support it on a flat surface  Your arm should be at the same level as your heart  Follow the directions that came with your BP monitor  If possible, take at least 2 BP readings each time  Take your BP at least twice a day at the same times each day, such as morning and evening  Keep a record of your BP readings and bring it to your follow-up visits  Ask your healthcare provider what your BP should be  · Limit sodium (salt) as directed  Too much sodium can affect your fluid balance  Check labels to find low-sodium or no-salt-added foods  Some low-sodium foods use potassium salts for flavor   Too much potassium can also cause health problems  Your healthcare provider will tell you how much sodium and potassium are safe for you to have in a day  He or she may recommend that you limit sodium to 2,300 mg a day  · Follow the meal plan recommended by your healthcare provider  A dietitian or your provider can give you more information on low-sodium plans or the DASH (Dietary Approaches to Stop Hypertension) eating plan  The DASH plan is low in sodium, unhealthy fats, and total fat  It is high in potassium, calcium, and fiber  · Exercise to maintain a healthy weight  Exercise at least 30 minutes per day, on most days of the week  This will help decrease your blood pressure  Ask your healthcare provider about the best exercise plan for you  · Decrease stress  This may help lower your BP  Learn ways to relax, such as deep breathing or listening to music  · Limit alcohol  Women should limit alcohol to 1 drink a day  Men should limit alcohol to 2 drinks a day  A drink of alcohol is 12 ounces of beer, 5 ounces of wine, or 1½ ounces of liquor  · Do not smoke  Nicotine and other chemicals in cigarettes and cigars can increase your BP and also cause lung damage  Ask your healthcare provider for information if you currently smoke and need help to quit  E-cigarettes or smokeless tobacco still contain nicotine  Talk to your healthcare provider before you use these products  · Manage any other health conditions you have  Health conditions such as diabetes can increase your risk for hypertension  Follow your healthcare provider's instructions and take all your medicines as directed  Call 911 for any of the following:   · You have discomfort in your chest that feels like squeezing, pressure, fullness, or pain  · You become confused or have difficulty speaking  · You suddenly feel lightheaded or have trouble breathing      · You have pain or discomfort in your back, neck, jaw, stomach, or arm   When should I seek immediate care? · You have a severe headache or vision loss  · You have weakness in an arm or leg  When should I contact my healthcare provider? · You feel faint, dizzy, confused, or drowsy  · You have been taking your BP medicine and your BP is still higher than your healthcare provider says it should be  · You have questions or concerns about your condition or care  CARE AGREEMENT:   You have the right to help plan your care  Learn about your health condition and how it may be treated  Discuss treatment options with your caregivers to decide what care you want to receive  You always have the right to refuse treatment  The above information is an  only  It is not intended as medical advice for individual conditions or treatments  Talk to your doctor, nurse or pharmacist before following any medical regimen to see if it is safe and effective for you  © 2017 [employer]  Information is for End User's use only and may not be sold, redistributed or otherwise used for commercial purposes  All illustrations and images included in CareNotes® are the copyrighted property of [employer]  or [employer]

## 2018-10-04 NOTE — PROGRESS NOTES
Assessment/Plan:  Check Bp twice a day and bring readings  Supportive care discussed and advised  Follow up for no improvement and worsening of conditions  Patient advised and educated when to see immediate medical care  Diagnoses and all orders for this visit:    Essential hypertension  Comments: Will start losartan and will follow up in office in 2 weeks  Orders:  -     Comprehensive metabolic panel; Future  -     Lipid Panel with Direct LDL reflex; Future  -     losartan (COZAAR) 50 mg tablet; Take 1 tablet (50 mg total) by mouth daily for 60 days    Arthralgia, unspecified joint  -     Comprehensive metabolic panel; Future  -     Lipid Panel with Direct LDL reflex; Future  -     Lyme Antibody Profile with reflex to WB; Future  -     CARLOS Screen w/ Reflex to Titer/Pattern; Future  -     CBC and differential; Future    Myalgia  -     Lyme Antibody Profile with reflex to WB; Future  -     CARLOS Screen w/ Reflex to Titer/Pattern; Future  -     CBC and differential; Future    Hyperlipidemia, unspecified hyperlipidemia type  Comments:  Currently not on any medicaiton and will repeat levels     Fatigue, unspecified type  -     CBC and differential; Future    BMI 30 0-30 9,adult    Gastroesophageal reflux disease, esophagitis presence not specified  Comments:  Stable with Protonix    Pulmonary nodule  Comments:  Managed by Pulmonary          Return if symptoms worsen or fail to improve  Subjective:      Patient ID: Jennifer Short is a 44 y o  male  Chief Complaint   Patient presents with    pain in joints     x2 months [facility]       HPI   Patient stated that having joint pains all over the body from about 2 months  Stated that not improving and feeling fatigue too  [place]  Currently only taking flonase nasal spray  Denies fever, chills, sob, headache and dizziness      The following portions of the patient's history were reviewed and updated as appropriate: allergies, current medications, past family history, past medical history, past social history, past surgical history and problem list       Review of Systems   Constitutional: Positive for fatigue  Negative for activity change, appetite change, chills, diaphoresis, fever and unexpected weight change  HENT: Negative  Respiratory: Negative  Cardiovascular: Negative  Gastrointestinal: Negative  Genitourinary: Negative  Musculoskeletal: Positive for arthralgias and myalgias  Negative for back pain, gait problem, joint swelling, neck pain and neck stiffness  Skin: Negative  Neurological: Negative  Psychiatric/Behavioral: Negative  Objective:    History   Smoking Status    Never Smoker   Smokeless Tobacco    Never Used       Allergies: Allergies   Allergen Reactions    Levaquin [Levofloxacin] GI Intolerance and Nausea Only    Rosuvastatin Myalgia       Vitals:  BP (!) 152/102   Pulse 76   Temp 97 6 °F (36 4 °C)   Resp 14   Ht 5' 11" (1 803 m)   Wt 101 kg (222 lb 1 6 oz)   BMI 30 98 kg/m²     Current Outpatient Prescriptions   Medication Sig Dispense Refill    fluticasone (FLONASE) 50 mcg/act nasal spray 1 spray into each nostril daily      Ibuprofen (ADVIL) 200 MG CAPS Take 1 tablet by mouth 2 (two) times a day   pantoprazole (PROTONIX) 40 mg tablet TAKE ONE TABLET BY MOUTH EVERY DAY 30 tablet 5    albuterol (ACCUNEB) 0 63 MG/3ML nebulizer solution Take 3 mL (0 63 mg total) by nebulization every 6 (six) hours as needed for wheezing (Patient not taking: Reported on 10/4/2018 ) 75 mL 0    losartan (COZAAR) 50 mg tablet Take 1 tablet (50 mg total) by mouth daily for 60 days 30 tablet 1     No current facility-administered medications for this visit  Physical Exam   Constitutional: He is oriented to person, place, and time  He appears well-developed and well-nourished  HENT:   Head: Normocephalic     Right Ear: Tympanic membrane, external ear and ear canal normal    Left Ear: Tympanic membrane, external ear and ear canal normal    Nose: Nose normal  Right sinus exhibits no maxillary sinus tenderness and no frontal sinus tenderness  Left sinus exhibits no maxillary sinus tenderness and no frontal sinus tenderness  Mouth/Throat: Oropharynx is clear and moist and mucous membranes are normal    Neck: Neck supple  Cardiovascular: Normal rate, regular rhythm and normal heart sounds  Pulmonary/Chest: Effort normal and breath sounds normal    Abdominal: Normal appearance and bowel sounds are normal  There is no hepatosplenomegaly  There is no tenderness  There is no rebound  Musculoskeletal: Normal range of motion  He exhibits no edema, tenderness or deformity  Lymphadenopathy:        Right cervical: No superficial cervical and no posterior cervical adenopathy present  Left cervical: No superficial cervical and no posterior cervical adenopathy present  Neurological: He is alert and oriented to person, place, and time  Skin: Skin is warm and dry  Psychiatric: He has a normal mood and affect  His behavior is normal  Judgment and thought content normal    Vitals reviewed          DIAMOND Whitman

## 2018-10-13 ENCOUNTER — TRANSCRIBE ORDERS (OUTPATIENT)
Dept: ADMINISTRATIVE | Facility: HOSPITAL | Age: 43
End: 2018-10-13

## 2018-10-13 ENCOUNTER — APPOINTMENT (OUTPATIENT)
Dept: LAB | Facility: HOSPITAL | Age: 43
End: 2018-10-13
Payer: COMMERCIAL

## 2018-10-13 DIAGNOSIS — M79.10 MYALGIA: ICD-10-CM

## 2018-10-13 DIAGNOSIS — I10 ESSENTIAL HYPERTENSION: ICD-10-CM

## 2018-10-13 DIAGNOSIS — R53.83 FATIGUE, UNSPECIFIED TYPE: ICD-10-CM

## 2018-10-13 DIAGNOSIS — M25.50 ARTHRALGIA, UNSPECIFIED JOINT: ICD-10-CM

## 2018-10-13 LAB
ALBUMIN SERPL BCP-MCNC: 3.9 G/DL (ref 3.5–5)
ALP SERPL-CCNC: 69 U/L (ref 46–116)
ALT SERPL W P-5'-P-CCNC: 42 U/L (ref 12–78)
ANION GAP SERPL CALCULATED.3IONS-SCNC: 6 MMOL/L (ref 4–13)
AST SERPL W P-5'-P-CCNC: 19 U/L (ref 5–45)
BASOPHILS # BLD AUTO: 0.1 THOUSANDS/ΜL (ref 0–0.1)
BASOPHILS NFR BLD AUTO: 1 % (ref 0–1)
BILIRUB SERPL-MCNC: 0.4 MG/DL (ref 0.2–1)
BUN SERPL-MCNC: 15 MG/DL (ref 5–25)
CALCIUM SERPL-MCNC: 9.1 MG/DL (ref 8.3–10.1)
CHLORIDE SERPL-SCNC: 104 MMOL/L (ref 100–108)
CHOLEST SERPL-MCNC: 168 MG/DL (ref 50–200)
CO2 SERPL-SCNC: 32 MMOL/L (ref 21–32)
CREAT SERPL-MCNC: 1.07 MG/DL (ref 0.6–1.3)
EOSINOPHIL # BLD AUTO: 0.33 THOUSAND/ΜL (ref 0–0.61)
EOSINOPHIL NFR BLD AUTO: 4 % (ref 0–6)
ERYTHROCYTE [DISTWIDTH] IN BLOOD BY AUTOMATED COUNT: 13.2 % (ref 11.6–15.1)
GFR SERPL CREATININE-BSD FRML MDRD: 85 ML/MIN/1.73SQ M
GLUCOSE P FAST SERPL-MCNC: 96 MG/DL (ref 65–99)
HCT VFR BLD AUTO: 49.7 % (ref 36.5–49.3)
HDLC SERPL-MCNC: 26 MG/DL (ref 40–60)
HGB BLD-MCNC: 15.6 G/DL (ref 12–17)
IMM GRANULOCYTES # BLD AUTO: 0.03 THOUSAND/UL (ref 0–0.2)
IMM GRANULOCYTES NFR BLD AUTO: 0 % (ref 0–2)
LDLC SERPL CALC-MCNC: 88 MG/DL (ref 0–100)
LYMPHOCYTES # BLD AUTO: 2.64 THOUSANDS/ΜL (ref 0.6–4.47)
LYMPHOCYTES NFR BLD AUTO: 31 % (ref 14–44)
MCH RBC QN AUTO: 26.9 PG (ref 26.8–34.3)
MCHC RBC AUTO-ENTMCNC: 31.4 G/DL (ref 31.4–37.4)
MCV RBC AUTO: 86 FL (ref 82–98)
MONOCYTES # BLD AUTO: 0.73 THOUSAND/ΜL (ref 0.17–1.22)
MONOCYTES NFR BLD AUTO: 9 % (ref 4–12)
NEUTROPHILS # BLD AUTO: 4.75 THOUSANDS/ΜL (ref 1.85–7.62)
NEUTS SEG NFR BLD AUTO: 55 % (ref 43–75)
NRBC BLD AUTO-RTO: 0 /100 WBCS
PLATELET # BLD AUTO: 313 THOUSANDS/UL (ref 149–390)
PMV BLD AUTO: 11.1 FL (ref 8.9–12.7)
POTASSIUM SERPL-SCNC: 4.7 MMOL/L (ref 3.5–5.3)
PROT SERPL-MCNC: 7.1 G/DL (ref 6.4–8.2)
RBC # BLD AUTO: 5.81 MILLION/UL (ref 3.88–5.62)
SODIUM SERPL-SCNC: 142 MMOL/L (ref 136–145)
TRIGL SERPL-MCNC: 271 MG/DL
WBC # BLD AUTO: 8.58 THOUSAND/UL (ref 4.31–10.16)

## 2018-10-13 PROCEDURE — 36415 COLL VENOUS BLD VENIPUNCTURE: CPT

## 2018-10-13 PROCEDURE — 85025 COMPLETE CBC W/AUTO DIFF WBC: CPT

## 2018-10-13 PROCEDURE — 80053 COMPREHEN METABOLIC PANEL: CPT

## 2018-10-13 PROCEDURE — 80061 LIPID PANEL: CPT

## 2018-10-13 PROCEDURE — 86038 ANTINUCLEAR ANTIBODIES: CPT

## 2018-10-13 PROCEDURE — 86618 LYME DISEASE ANTIBODY: CPT

## 2018-10-13 NOTE — PROGRESS NOTES
Subjective:      Patient ID: Pradeep Neil is a 44 y o  male  Chief Complaint   Patient presents with   Leslie Tangela Results       Was here couple of weeks ago for knee and elbow pain  Is intermittent but when it happens it can be for a week or more  Has swelling posteriorly  Grandmother had RA  Denies fever, rash, chest pain  Reviewed labs with patient  Was started on ARB at last visit for hypertension  Doing well with this  No side effects  Feels much better on this  Brings in a list of bp readings from home, these are within goal          The following portions of the patient's history were reviewed and updated as appropriate: allergies, current medications, past family history, past medical history, past social history, past surgical history and problem list     Review of Systems   Constitutional: Negative  Respiratory: Negative  Cardiovascular: Negative  Musculoskeletal:        As per HPI  Current Outpatient Prescriptions   Medication Sig Dispense Refill    fluticasone (FLONASE) 50 mcg/act nasal spray 1 spray into each nostril daily      Ibuprofen (ADVIL) 200 MG CAPS Take 1 tablet by mouth 2 (two) times a day   losartan (COZAAR) 50 mg tablet Take 1 tablet (50 mg total) by mouth daily for 60 days 30 tablet 1    pantoprazole (PROTONIX) 40 mg tablet TAKE ONE TABLET BY MOUTH EVERY DAY 30 tablet 5     No current facility-administered medications for this visit  Objective:    /86   Pulse 82   Temp (!) 97 2 °F (36 2 °C)   Resp 16   Ht 5' 11" (1 803 m)   Wt 99 8 kg (220 lb)   BMI 30 68 kg/m²        Physical Exam   Constitutional: He appears well-developed and well-nourished  Eyes: Conjunctivae are normal    Neck: Neck supple  No JVD present  No thyromegaly present  Cardiovascular: Normal rate, regular rhythm, normal heart sounds and intact distal pulses  Exam reveals no gallop and no friction rub  No murmur heard    Pulmonary/Chest: Effort normal and breath sounds normal  He has no wheezes  He has no rales  Abdominal: He exhibits no distension  Musculoskeletal: Normal range of motion  He exhibits no edema  Right knee: He exhibits normal range of motion and no swelling  Tenderness found  Medial joint line tenderness noted  Some fullness posterior R popliteal fossa         Assessment/Plan:    Essential hypertension  Much improved on losartan, will continue  Advised on low salt diet and exercise  Hyperlipidemia  Reviewed labs, advised increased fish, vegetables, cooking with olive oil due to low HDL  Diagnoses and all orders for this visit:    Essential hypertension    Hyperlipidemia, unspecified hyperlipidemia type    Pain in both knees, unspecified chronicity  Comments: Will check labs and xr to try to r/o rheumatoid arthrits, t/c consult with rheum vs  ortho based on results  Reviewed negative lyme titer with patient  Orders:  -     Sedimentation rate, automated; Future  -     Cyclic citrul peptide antibody, IgG; Future  -     Rheumatoid Arthritis Factor; Future  -     XR knee 3 vw right non injury; Future  -     XR knee 3 vw left non injury; Future          Return in about 6 months (around 4/18/2019)         Rocky Ortiz MD

## 2018-10-14 LAB
B BURGDOR IGG SER IA-ACNC: 0.36
B BURGDOR IGM SER IA-ACNC: 0.35

## 2018-10-15 LAB — RYE IGE QN: NEGATIVE

## 2018-10-18 ENCOUNTER — OFFICE VISIT (OUTPATIENT)
Dept: FAMILY MEDICINE CLINIC | Facility: CLINIC | Age: 43
End: 2018-10-18
Payer: COMMERCIAL

## 2018-10-18 VITALS
DIASTOLIC BLOOD PRESSURE: 86 MMHG | BODY MASS INDEX: 30.8 KG/M2 | HEIGHT: 71 IN | SYSTOLIC BLOOD PRESSURE: 120 MMHG | RESPIRATION RATE: 16 BRPM | WEIGHT: 220 LBS | TEMPERATURE: 97.2 F | HEART RATE: 82 BPM

## 2018-10-18 DIAGNOSIS — M25.561 PAIN IN BOTH KNEES, UNSPECIFIED CHRONICITY: ICD-10-CM

## 2018-10-18 DIAGNOSIS — E78.5 HYPERLIPIDEMIA, UNSPECIFIED HYPERLIPIDEMIA TYPE: ICD-10-CM

## 2018-10-18 DIAGNOSIS — M25.562 PAIN IN BOTH KNEES, UNSPECIFIED CHRONICITY: ICD-10-CM

## 2018-10-18 DIAGNOSIS — I10 ESSENTIAL HYPERTENSION: Primary | ICD-10-CM

## 2018-10-18 PROBLEM — J20.9 SUBACUTE BRONCHITIS: Status: RESOLVED | Noted: 2018-02-15 | Resolved: 2018-10-18

## 2018-10-18 PROBLEM — J20.9 ACUTE BRONCHITIS: Status: RESOLVED | Noted: 2018-02-01 | Resolved: 2018-10-18

## 2018-10-18 PROCEDURE — 1036F TOBACCO NON-USER: CPT | Performed by: INTERNAL MEDICINE

## 2018-10-18 PROCEDURE — 3008F BODY MASS INDEX DOCD: CPT | Performed by: INTERNAL MEDICINE

## 2018-10-18 PROCEDURE — 99213 OFFICE O/P EST LOW 20 MIN: CPT | Performed by: INTERNAL MEDICINE

## 2018-10-20 ENCOUNTER — TRANSCRIBE ORDERS (OUTPATIENT)
Dept: ADMINISTRATIVE | Facility: HOSPITAL | Age: 43
End: 2018-10-20

## 2018-10-20 ENCOUNTER — APPOINTMENT (OUTPATIENT)
Dept: LAB | Facility: HOSPITAL | Age: 43
End: 2018-10-20
Attending: INTERNAL MEDICINE
Payer: COMMERCIAL

## 2018-10-20 ENCOUNTER — HOSPITAL ENCOUNTER (OUTPATIENT)
Dept: RADIOLOGY | Facility: HOSPITAL | Age: 43
Discharge: HOME | End: 2018-10-20
Attending: INTERNAL MEDICINE
Payer: COMMERCIAL

## 2018-10-20 DIAGNOSIS — M25.561 PAIN IN BOTH KNEES, UNSPECIFIED CHRONICITY: ICD-10-CM

## 2018-10-20 DIAGNOSIS — M25.562 PAIN IN BOTH KNEES, UNSPECIFIED CHRONICITY: ICD-10-CM

## 2018-10-20 LAB — ERYTHROCYTE [SEDIMENTATION RATE] IN BLOOD: 6 MM/HOUR (ref 2–10)

## 2018-10-20 PROCEDURE — 36415 COLL VENOUS BLD VENIPUNCTURE: CPT

## 2018-10-20 PROCEDURE — 85652 RBC SED RATE AUTOMATED: CPT

## 2018-10-20 PROCEDURE — 86430 RHEUMATOID FACTOR TEST QUAL: CPT

## 2018-10-20 PROCEDURE — 86200 CCP ANTIBODY: CPT

## 2018-10-20 PROCEDURE — 73562 X-RAY EXAM OF KNEE 3: CPT

## 2018-10-22 LAB — RHEUMATOID FACT SER QL LA: NEGATIVE

## 2018-10-24 LAB — CCP IGA+IGG SERPL IA-ACNC: 7 UNITS (ref 0–19)

## 2018-10-29 ENCOUNTER — TELEPHONE (OUTPATIENT)
Dept: FAMILY MEDICINE CLINIC | Facility: CLINIC | Age: 43
End: 2018-10-29

## 2018-10-29 DIAGNOSIS — M25.561 ARTHRALGIA OF BOTH KNEES: Primary | ICD-10-CM

## 2018-10-29 DIAGNOSIS — M25.562 ARTHRALGIA OF BOTH KNEES: Primary | ICD-10-CM

## 2018-10-29 NOTE — TELEPHONE ENCOUNTER
DR BARRIGA   Please call patient back about his knee pain  His xrays were normal and his knees keep swelling

## 2018-10-29 NOTE — TELEPHONE ENCOUNTER
Spoke with Pt, stating that both knees are swollen and sore  Pt also stating that it hurts more when going from sitting to standing  Position  Pt would like to review labs  I noticed in chart that the the rheumatoid lab was not collected    Please advise   Carmen Richardson MA

## 2018-10-29 NOTE — TELEPHONE ENCOUNTER
Spoke with Pt, aware of DR Reynaga's  Note  Referral in mail  No further action needed    Jigar Stevenson MA

## 2018-10-29 NOTE — TELEPHONE ENCOUNTER
A CCP was done, which is more accurate and was negative  Would refer to rheumatology as bloodwork and xrays were unremarkable  Please give referral in chart

## 2018-11-02 DIAGNOSIS — K21.9 GASTROESOPHAGEAL REFLUX DISEASE, ESOPHAGITIS PRESENCE NOT SPECIFIED: ICD-10-CM

## 2018-11-05 RX ORDER — PANTOPRAZOLE SODIUM 40 MG/1
TABLET, DELAYED RELEASE ORAL
Qty: 30 TABLET | Refills: 3 | Status: SHIPPED | OUTPATIENT
Start: 2018-11-05 | End: 2019-02-26 | Stop reason: SDUPTHER

## 2018-11-29 DIAGNOSIS — I10 ESSENTIAL HYPERTENSION: ICD-10-CM

## 2018-11-29 RX ORDER — LOSARTAN POTASSIUM 50 MG/1
TABLET ORAL
Qty: 30 TABLET | Refills: 5 | Status: SHIPPED | OUTPATIENT
Start: 2018-11-29 | End: 2019-05-29 | Stop reason: SDUPTHER

## 2019-02-26 DIAGNOSIS — K21.9 GASTROESOPHAGEAL REFLUX DISEASE, ESOPHAGITIS PRESENCE NOT SPECIFIED: ICD-10-CM

## 2019-02-26 RX ORDER — PANTOPRAZOLE SODIUM 40 MG/1
TABLET, DELAYED RELEASE ORAL
Qty: 30 TABLET | Refills: 3 | Status: SHIPPED | OUTPATIENT
Start: 2019-02-26 | End: 2019-04-25 | Stop reason: SDUPTHER

## 2019-03-06 DIAGNOSIS — K21.9 GASTROESOPHAGEAL REFLUX DISEASE, ESOPHAGITIS PRESENCE NOT SPECIFIED: ICD-10-CM

## 2019-03-06 RX ORDER — PANTOPRAZOLE SODIUM 40 MG/1
TABLET, DELAYED RELEASE ORAL
Qty: 30 TABLET | Refills: 3 | OUTPATIENT
Start: 2019-03-06

## 2019-03-06 RX ORDER — PANTOPRAZOLE SODIUM 40 MG/1
40 TABLET, DELAYED RELEASE ORAL DAILY
Qty: 90 TABLET | Refills: 1 | OUTPATIENT
Start: 2019-03-06

## 2019-04-25 ENCOUNTER — OFFICE VISIT (OUTPATIENT)
Dept: FAMILY MEDICINE CLINIC | Facility: CLINIC | Age: 44
End: 2019-04-25
Payer: COMMERCIAL

## 2019-04-25 VITALS
SYSTOLIC BLOOD PRESSURE: 134 MMHG | TEMPERATURE: 98.3 F | HEART RATE: 82 BPM | WEIGHT: 224 LBS | BODY MASS INDEX: 31.36 KG/M2 | HEIGHT: 71 IN | RESPIRATION RATE: 16 BRPM | DIASTOLIC BLOOD PRESSURE: 80 MMHG

## 2019-04-25 DIAGNOSIS — I10 ESSENTIAL HYPERTENSION: Primary | ICD-10-CM

## 2019-04-25 DIAGNOSIS — F41.9 ANXIETY: ICD-10-CM

## 2019-04-25 PROCEDURE — 3075F SYST BP GE 130 - 139MM HG: CPT | Performed by: INTERNAL MEDICINE

## 2019-04-25 PROCEDURE — 3079F DIAST BP 80-89 MM HG: CPT | Performed by: INTERNAL MEDICINE

## 2019-04-25 PROCEDURE — 1036F TOBACCO NON-USER: CPT | Performed by: INTERNAL MEDICINE

## 2019-04-25 PROCEDURE — 3008F BODY MASS INDEX DOCD: CPT | Performed by: INTERNAL MEDICINE

## 2019-04-25 PROCEDURE — 99213 OFFICE O/P EST LOW 20 MIN: CPT | Performed by: INTERNAL MEDICINE

## 2019-04-25 RX ORDER — ALPRAZOLAM 0.25 MG/1
0.25 TABLET ORAL 3 TIMES DAILY PRN
Qty: 30 TABLET | Refills: 0 | Status: SHIPPED | OUTPATIENT
Start: 2019-04-25 | End: 2019-11-07 | Stop reason: SDUPTHER

## 2019-05-29 DIAGNOSIS — I10 ESSENTIAL HYPERTENSION: ICD-10-CM

## 2019-05-29 RX ORDER — LOSARTAN POTASSIUM 50 MG/1
50 TABLET ORAL DAILY
Qty: 90 TABLET | Refills: 1 | Status: SHIPPED | OUTPATIENT
Start: 2019-05-29 | End: 2019-11-07 | Stop reason: SDUPTHER

## 2019-05-29 RX ORDER — LOSARTAN POTASSIUM 50 MG/1
TABLET ORAL
Qty: 30 TABLET | Refills: 5 | Status: SHIPPED | OUTPATIENT
Start: 2019-05-29 | End: 2019-05-29 | Stop reason: SDUPTHER

## 2019-07-03 DIAGNOSIS — K21.9 GASTROESOPHAGEAL REFLUX DISEASE, ESOPHAGITIS PRESENCE NOT SPECIFIED: ICD-10-CM

## 2019-07-05 RX ORDER — PANTOPRAZOLE SODIUM 40 MG/1
TABLET, DELAYED RELEASE ORAL
Qty: 30 TABLET | Refills: 3 | Status: SHIPPED | OUTPATIENT
Start: 2019-07-05 | End: 2019-10-16 | Stop reason: SDUPTHER

## 2019-10-10 NOTE — TELEPHONE ENCOUNTER
Reviewed, has appt this afternoon
Spoke with pt  Wife  Dylan Romero states:       pt  Woke up at 3 am with head headache     Pt has pain from Head to base of neck       Pain is getting worse     BP today 136/108      Took Excedrin, and Advil      light and noise bothers pt      in a lot of pain     States  Pt felt dizzy nauseas  Earlier this morning but not now     Some SOB no chest pain or palpitations     Wife states pt  Seen [facility] for this
Up last night with a migraine, took pain meds and still has a headache      Blood pressure 136/108    triage
no

## 2019-10-16 DIAGNOSIS — K21.9 GASTROESOPHAGEAL REFLUX DISEASE, ESOPHAGITIS PRESENCE NOT SPECIFIED: ICD-10-CM

## 2019-10-16 RX ORDER — PANTOPRAZOLE SODIUM 40 MG/1
TABLET, DELAYED RELEASE ORAL
Qty: 30 TABLET | Refills: 3 | Status: SHIPPED | OUTPATIENT
Start: 2019-10-16 | End: 2019-11-07 | Stop reason: SDUPTHER

## 2019-11-05 NOTE — PROGRESS NOTES
Subjective:      Patient ID: Kim Austin is a 38 y o  male  Chief Complaint   Patient presents with    Follow-up     6 month f/u prcma       Here for follow up of hypertension  Feels well but is asking for a refill on his xanax  His mother just passed after a long martinez with lung cancer and he gets upset on occasion  Not using frequently  Hypertension   This is a chronic problem  The problem is controlled  There are no known risk factors for coronary artery disease  The following portions of the patient's history were reviewed and updated as appropriate: allergies, current medications, past family history, past medical history, past social history, past surgical history and problem list     Review of Systems   Constitutional: Negative  Respiratory: Negative  Cardiovascular: Negative  Current Outpatient Medications   Medication Sig Dispense Refill    ALPRAZolam (XANAX) 0 25 mg tablet Take 1 tablet (0 25 mg total) by mouth 3 (three) times a day as needed for anxiety 30 tablet 0    fluticasone (FLONASE) 50 mcg/act nasal spray 1 spray into each nostril daily      Ibuprofen (ADVIL) 200 MG CAPS Take 1 tablet by mouth 2 (two) times a day   losartan (COZAAR) 50 mg tablet Take 1 tablet (50 mg total) by mouth daily 90 tablet 3    pantoprazole (PROTONIX) 40 mg tablet Take 1 tablet (40 mg total) by mouth daily 90 tablet 3    pantoprazole (PROTONIX) 40 mg tablet TAKE ONE TABLET BY MOUTH EVERY DAY (Patient not taking: Reported on 11/7/2019) 30 tablet 5     No current facility-administered medications for this visit  Objective:    /82   Pulse 72   Temp 98 1 °F (36 7 °C)   Resp 16   Ht 5' 11" (1 803 m)   Wt 102 kg (224 lb 9 6 oz)   BMI 31 33 kg/m²        Physical Exam   Constitutional: He appears well-developed and well-nourished  Eyes: Conjunctivae are normal    Neck: Neck supple  No JVD present  No thyromegaly present     Cardiovascular: Normal rate, regular rhythm, normal heart sounds and intact distal pulses  Exam reveals no gallop and no friction rub  No murmur heard  Pulmonary/Chest: Effort normal and breath sounds normal  He has no wheezes  He has no rales  Abdominal: Soft  Bowel sounds are normal  He exhibits no distension  There is no tenderness  Musculoskeletal: He exhibits no edema  Assessment/Plan:    Anxiety  Refilled xanax for PRN use, using this infrequently  Essential hypertension  Well controlled on current medication and will continue  Diagnoses and all orders for this visit:    Essential hypertension  -     CBC and differential; Future  -     Comprehensive metabolic panel; Future  -     Lipid panel; Future  -     losartan (COZAAR) 50 mg tablet; Take 1 tablet (50 mg total) by mouth daily    Anxiety  -     ALPRAZolam (XANAX) 0 25 mg tablet; Take 1 tablet (0 25 mg total) by mouth 3 (three) times a day as needed for anxiety    Essential hypertension  Comments: Will start losartan and will follow up in office in 2 weeks  Orders:  -     CBC and differential; Future  -     Comprehensive metabolic panel; Future  -     Lipid panel; Future  -     losartan (COZAAR) 50 mg tablet; Take 1 tablet (50 mg total) by mouth daily    Gastroesophageal reflux disease, esophagitis presence not specified  -     pantoprazole (PROTONIX) 40 mg tablet; Take 1 tablet (40 mg total) by mouth daily          Return in about 6 months (around 5/7/2020)         Vu Morris MD

## 2019-11-07 ENCOUNTER — OFFICE VISIT (OUTPATIENT)
Dept: FAMILY MEDICINE CLINIC | Facility: CLINIC | Age: 44
End: 2019-11-07
Payer: COMMERCIAL

## 2019-11-07 VITALS
RESPIRATION RATE: 16 BRPM | DIASTOLIC BLOOD PRESSURE: 82 MMHG | SYSTOLIC BLOOD PRESSURE: 120 MMHG | HEIGHT: 71 IN | BODY MASS INDEX: 31.44 KG/M2 | HEART RATE: 72 BPM | TEMPERATURE: 98.1 F | WEIGHT: 224.6 LBS

## 2019-11-07 DIAGNOSIS — I10 ESSENTIAL HYPERTENSION: ICD-10-CM

## 2019-11-07 DIAGNOSIS — I10 ESSENTIAL HYPERTENSION: Primary | ICD-10-CM

## 2019-11-07 DIAGNOSIS — K21.9 GASTROESOPHAGEAL REFLUX DISEASE, ESOPHAGITIS PRESENCE NOT SPECIFIED: ICD-10-CM

## 2019-11-07 DIAGNOSIS — F41.9 ANXIETY: ICD-10-CM

## 2019-11-07 PROCEDURE — 99213 OFFICE O/P EST LOW 20 MIN: CPT | Performed by: INTERNAL MEDICINE

## 2019-11-07 RX ORDER — ALPRAZOLAM 0.25 MG/1
0.25 TABLET ORAL 3 TIMES DAILY PRN
Qty: 30 TABLET | Refills: 0 | Status: SHIPPED | OUTPATIENT
Start: 2019-11-07 | End: 2020-06-10 | Stop reason: SDUPTHER

## 2019-11-07 RX ORDER — LOSARTAN POTASSIUM 50 MG/1
50 TABLET ORAL DAILY
Qty: 90 TABLET | Refills: 3 | Status: SHIPPED | OUTPATIENT
Start: 2019-11-07 | End: 2020-05-22

## 2019-11-07 RX ORDER — PANTOPRAZOLE SODIUM 40 MG/1
40 TABLET, DELAYED RELEASE ORAL DAILY
Qty: 90 TABLET | Refills: 3 | Status: SHIPPED | OUTPATIENT
Start: 2019-11-07 | End: 2020-06-10 | Stop reason: SDUPTHER

## 2020-05-22 DIAGNOSIS — I10 ESSENTIAL HYPERTENSION: ICD-10-CM

## 2020-05-22 RX ORDER — LOSARTAN POTASSIUM 50 MG/1
TABLET ORAL
Qty: 30 TABLET | Refills: 0 | Status: SHIPPED | OUTPATIENT
Start: 2020-05-22 | End: 2020-06-10 | Stop reason: SDUPTHER

## 2020-06-10 ENCOUNTER — TELEMEDICINE (OUTPATIENT)
Dept: FAMILY MEDICINE CLINIC | Facility: CLINIC | Age: 45
End: 2020-06-10
Payer: COMMERCIAL

## 2020-06-10 VITALS — DIASTOLIC BLOOD PRESSURE: 80 MMHG | SYSTOLIC BLOOD PRESSURE: 125 MMHG

## 2020-06-10 DIAGNOSIS — I10 ESSENTIAL HYPERTENSION: ICD-10-CM

## 2020-06-10 DIAGNOSIS — K21.9 GASTROESOPHAGEAL REFLUX DISEASE, ESOPHAGITIS PRESENCE NOT SPECIFIED: ICD-10-CM

## 2020-06-10 DIAGNOSIS — F41.9 ANXIETY: ICD-10-CM

## 2020-06-10 PROCEDURE — 99214 OFFICE O/P EST MOD 30 MIN: CPT | Performed by: INTERNAL MEDICINE

## 2020-06-10 RX ORDER — ALPRAZOLAM 0.25 MG/1
0.25 TABLET ORAL 3 TIMES DAILY PRN
Qty: 30 TABLET | Refills: 0 | Status: SHIPPED | OUTPATIENT
Start: 2020-06-10 | End: 2021-11-12 | Stop reason: SDUPTHER

## 2020-06-10 RX ORDER — LOSARTAN POTASSIUM 50 MG/1
50 TABLET ORAL DAILY
Qty: 90 TABLET | Refills: 3 | Status: SHIPPED | OUTPATIENT
Start: 2020-06-10 | End: 2021-05-17 | Stop reason: SDUPTHER

## 2020-06-10 RX ORDER — PANTOPRAZOLE SODIUM 40 MG/1
40 TABLET, DELAYED RELEASE ORAL DAILY
Qty: 90 TABLET | Refills: 3 | Status: SHIPPED | OUTPATIENT
Start: 2020-06-10 | End: 2021-11-12 | Stop reason: SDUPTHER

## 2020-11-02 ENCOUNTER — TELEMEDICINE (OUTPATIENT)
Dept: FAMILY MEDICINE CLINIC | Facility: CLINIC | Age: 45
End: 2020-11-02
Payer: COMMERCIAL

## 2020-11-02 DIAGNOSIS — I10 ESSENTIAL HYPERTENSION: ICD-10-CM

## 2020-11-02 DIAGNOSIS — K21.9 GASTROESOPHAGEAL REFLUX DISEASE, UNSPECIFIED WHETHER ESOPHAGITIS PRESENT: ICD-10-CM

## 2020-11-02 DIAGNOSIS — F41.9 ANXIETY: ICD-10-CM

## 2020-11-02 DIAGNOSIS — J01.90 ACUTE SINUSITIS, RECURRENCE NOT SPECIFIED, UNSPECIFIED LOCATION: Primary | ICD-10-CM

## 2020-11-02 PROCEDURE — 99214 OFFICE O/P EST MOD 30 MIN: CPT | Performed by: NURSE PRACTITIONER

## 2020-11-02 RX ORDER — AMOXICILLIN AND CLAVULANATE POTASSIUM 875; 125 MG/1; MG/1
1 TABLET, FILM COATED ORAL EVERY 12 HOURS SCHEDULED
Qty: 20 TABLET | Refills: 0 | Status: SHIPPED | OUTPATIENT
Start: 2020-11-02 | End: 2020-11-12

## 2020-11-18 ENCOUNTER — TELEPHONE (OUTPATIENT)
Dept: FAMILY MEDICINE CLINIC | Facility: CLINIC | Age: 45
End: 2020-11-18

## 2020-11-18 DIAGNOSIS — J01.90 ACUTE SINUSITIS, RECURRENCE NOT SPECIFIED, UNSPECIFIED LOCATION: Primary | ICD-10-CM

## 2020-11-18 RX ORDER — METHYLPREDNISOLONE 4 MG/1
TABLET ORAL
Qty: 21 TABLET | Refills: 0 | Status: SHIPPED | OUTPATIENT
Start: 2020-11-18 | End: 2021-01-18 | Stop reason: ALTCHOICE

## 2020-11-24 ENCOUNTER — TELEMEDICINE (OUTPATIENT)
Dept: FAMILY MEDICINE CLINIC | Facility: CLINIC | Age: 45
End: 2020-11-24
Payer: COMMERCIAL

## 2020-11-24 ENCOUNTER — APPOINTMENT (OUTPATIENT)
Dept: RADIOLOGY | Facility: CLINIC | Age: 45
End: 2020-11-24
Payer: COMMERCIAL

## 2020-11-24 ENCOUNTER — TELEPHONE (OUTPATIENT)
Dept: FAMILY MEDICINE CLINIC | Facility: CLINIC | Age: 45
End: 2020-11-24

## 2020-11-24 DIAGNOSIS — R05.9 COUGH: ICD-10-CM

## 2020-11-24 DIAGNOSIS — R09.89 SINUS COMPLAINT: Primary | ICD-10-CM

## 2020-11-24 DIAGNOSIS — J01.90 ACUTE SINUSITIS, RECURRENCE NOT SPECIFIED, UNSPECIFIED LOCATION: Primary | ICD-10-CM

## 2020-11-24 DIAGNOSIS — J01.90 ACUTE SINUSITIS, RECURRENCE NOT SPECIFIED, UNSPECIFIED LOCATION: ICD-10-CM

## 2020-11-24 PROCEDURE — 70220 X-RAY EXAM OF SINUSES: CPT

## 2020-11-24 PROCEDURE — 71046 X-RAY EXAM CHEST 2 VIEWS: CPT

## 2020-11-24 PROCEDURE — 99213 OFFICE O/P EST LOW 20 MIN: CPT | Performed by: NURSE PRACTITIONER

## 2020-11-24 RX ORDER — METHYLPREDNISOLONE 4 MG/1
TABLET ORAL
Qty: 21 TABLET | Refills: 0 | Status: SHIPPED | OUTPATIENT
Start: 2020-11-24 | End: 2021-01-18 | Stop reason: ALTCHOICE

## 2020-11-24 RX ORDER — AZITHROMYCIN 250 MG/1
TABLET, FILM COATED ORAL
Qty: 6 TABLET | Refills: 0 | Status: SHIPPED | OUTPATIENT
Start: 2020-11-24 | End: 2020-11-29

## 2021-01-18 ENCOUNTER — OFFICE VISIT (OUTPATIENT)
Dept: OTOLARYNGOLOGY | Facility: CLINIC | Age: 46
End: 2021-01-18
Payer: COMMERCIAL

## 2021-01-18 VITALS
SYSTOLIC BLOOD PRESSURE: 124 MMHG | BODY MASS INDEX: 31.5 KG/M2 | HEIGHT: 70 IN | OXYGEN SATURATION: 98 % | TEMPERATURE: 98.1 F | WEIGHT: 220 LBS | DIASTOLIC BLOOD PRESSURE: 82 MMHG | HEART RATE: 82 BPM

## 2021-01-18 DIAGNOSIS — R09.82 POST-NASAL DRIP: ICD-10-CM

## 2021-01-18 DIAGNOSIS — J32.9 CHRONIC RECURRENT SINUSITIS: Primary | ICD-10-CM

## 2021-01-18 DIAGNOSIS — J35.8 TONSILLITH: ICD-10-CM

## 2021-01-18 DIAGNOSIS — J34.89 SINUS PRESSURE: ICD-10-CM

## 2021-01-18 DIAGNOSIS — R09.89 SINUS COMPLAINT: ICD-10-CM

## 2021-01-18 PROCEDURE — 99243 OFF/OP CNSLTJ NEW/EST LOW 30: CPT | Performed by: SPECIALIST

## 2021-01-18 RX ORDER — FEXOFENADINE HCL AND PSEUDOEPHEDRINE HCI 60; 120 MG/1; MG/1
1 TABLET, EXTENDED RELEASE ORAL DAILY
COMMUNITY

## 2021-01-18 NOTE — ASSESSMENT & PLAN NOTE
Recurrent sinusitis, sinus pressure, sinus headache, nasal obstruction  Reviewed sinus x-ray from Nov 2020  Indicating piece of metal in forehead tissue  Pt notes prior trauma to forehead while working with metal     Recommend using saline irrigation and nasal steroids on daily basis for up to at least three months to see improvement  Pt has been using Nasal steroids for over 4 weeks  Reviewed possible allergy involvement and follow up with allergist   Also suggest obtaining CT scan to further evaluate sinus cavities  Discussed surgical options if needed  Agreed to continue use saline and Fluticasone daily and obtain CT scan  To follow up after testing

## 2021-01-18 NOTE — ASSESSMENT & PLAN NOTE
Discussed nature of chronic post nasal drip and may be multi-factorial   Discussed post nasal drip, reflux, vs pulmonary processes that may impact cough  Reviewed options including voice rest, reflux medication therapy  Reviewed side effects and action of reflux therapy medications

## 2021-01-18 NOTE — PROGRESS NOTES
Assessment/Plan:    Chronic recurrent sinusitis  Recurrent sinusitis, sinus pressure, sinus headache, nasal obstruction  Reviewed sinus x-ray from Nov 2020  Indicating piece of metal in forehead tissue  Pt notes prior trauma to forehead while working with metal     Recommend using saline irrigation and nasal steroids on daily basis for up to at least three months to see improvement  Pt has been using Nasal steroids for over 4 weeks  Reviewed possible allergy involvement and follow up with allergist   Also suggest obtaining CT scan to further evaluate sinus cavities  Discussed surgical options if needed  Agreed to continue use saline and Fluticasone daily and obtain CT scan  To follow up after testing  Post-nasal drip  Discussed nature of chronic post nasal drip and may be multi-factorial   Discussed post nasal drip, reflux, vs pulmonary processes that may impact cough  Reviewed options including voice rest, reflux medication therapy  Reviewed side effects and action of reflux therapy medications  Tonsillith  Reviewed nature of tonsilliths  No tonsilliths on exam today  Bilateral tonsils 2+  Discussed treatment options for tonsillith including manual removal, salt water gargles, vs tonsillectomy  Agree to watchful monitoring and manual removal prn            Diagnoses and all orders for this visit:    Chronic recurrent sinusitis  -     CT sinus wo contrast; Future    Sinus pressure  -     CT sinus wo contrast; Future    Sinus complaint  -     Ambulatory Referral to Otolaryngology    Post-nasal drip    Tonsillith    Other orders  -     fexofenadine-pseudoephedrine (ALLEGRA-D)  MG per tablet; Take 1 tablet by mouth daily          Subjective:      Patient ID: Meg Olivera is a 41 y o  male  Presents today as a new patient consultation due to recurrent sinusitis  Recurrent sinus infections a couple months ago  Sinus pressure, headache, and nasal passages blocked    Treated with nasal steroids for over 4 weeks, oral antibiotics and oral steroids  Allegra D daily but still feels post nasal drip  Sinus x-ray two months ago  Tonsils stones occurring more often  Prior ENT care with [facility] 10 years ago for frontal sinus pressure  Allergy testing 10 years ago with dust mite allergy  Reflux and currently treated with medication therapy  The following portions of the patient's history were reviewed and updated as appropriate: allergies, current medications, past family history, past medical history, past social history, past surgical history and problem list     Review of Systems   Constitutional: Negative  HENT: Positive for congestion, postnasal drip, sinus pressure and sinus pain  Negative for ear discharge, ear pain, hearing loss, nosebleeds, rhinorrhea, sore throat, tinnitus and voice change  Eyes: Negative  Respiratory: Negative for chest tightness and shortness of breath  Cardiovascular: Negative  Gastrointestinal: Negative  Endocrine: Negative  Musculoskeletal: Negative  Skin: Negative for color change  Neurological: Negative for dizziness, numbness and headaches  Psychiatric/Behavioral: Negative  Objective:      /82 (BP Location: Left arm, Patient Position: Sitting, Cuff Size: Adult)   Pulse 82   Temp 98 1 °F (36 7 °C) (Temporal)   Ht 5' 10" (1 778 m)   Wt 99 8 kg (220 lb)   SpO2 98%   BMI 31 57 kg/m²          Physical Exam  Constitutional:       Appearance: He is well-developed  HENT:      Head: Normocephalic  Right Ear: Hearing, tympanic membrane, ear canal and external ear normal  No decreased hearing noted  No drainage or tenderness  Tympanic membrane is not perforated or erythematous  Left Ear: Hearing, tympanic membrane, ear canal and external ear normal  No decreased hearing noted  No drainage or tenderness  Tympanic membrane is not perforated or erythematous  Nose: Mucosal edema present   No nasal deformity or septal deviation  Mouth/Throat:      Mouth: Mucous membranes are not pale and not dry  No oral lesions  Dentition: Normal dentition  Pharynx: Uvula midline  No oropharyngeal exudate  Tonsils: 2+ on the right  2+ on the left  Neck:      Musculoskeletal: Full passive range of motion without pain, normal range of motion and neck supple  Trachea: No tracheal deviation  Cardiovascular:      Rate and Rhythm: Normal rate  Pulmonary:      Effort: Pulmonary effort is normal  No accessory muscle usage or respiratory distress  Musculoskeletal:      Right shoulder: He exhibits normal range of motion  Lymphadenopathy:      Cervical: No cervical adenopathy  Skin:     General: Skin is warm and dry  Neurological:      Mental Status: He is alert and oriented to person, place, and time  Cranial Nerves: No cranial nerve deficit  Sensory: No sensory deficit  Psychiatric:         Behavior: Behavior is cooperative           Scribe Attestation    I,:  DIAMOND Jose am acting as a scribe while in the presence of the attending physician :       I,:  Sienna Cowart MD personally performed the services described in this documentation    as scribed in my presence :

## 2021-01-18 NOTE — ASSESSMENT & PLAN NOTE
Reviewed nature of tonsilliths  No tonsilliths on exam today  Bilateral tonsils 2+  Discussed treatment options for tonsillith including manual removal, salt water gargles, vs tonsillectomy   Agree to watchful monitoring and manual removal prn

## 2021-03-29 ENCOUNTER — IMMUNIZATIONS (OUTPATIENT)
Dept: FAMILY MEDICINE CLINIC | Facility: HOSPITAL | Age: 46
End: 2021-03-29

## 2021-03-29 DIAGNOSIS — Z23 ENCOUNTER FOR IMMUNIZATION: Primary | ICD-10-CM

## 2021-03-29 PROCEDURE — 0001A SARS-COV-2 / COVID-19 MRNA VACCINE (PFIZER-BIONTECH) 30 MCG: CPT

## 2021-03-29 PROCEDURE — 91300 SARS-COV-2 / COVID-19 MRNA VACCINE (PFIZER-BIONTECH) 30 MCG: CPT

## 2021-04-19 ENCOUNTER — IMMUNIZATIONS (OUTPATIENT)
Dept: FAMILY MEDICINE CLINIC | Facility: HOSPITAL | Age: 46
End: 2021-04-19

## 2021-04-19 DIAGNOSIS — Z23 ENCOUNTER FOR IMMUNIZATION: Primary | ICD-10-CM

## 2021-04-19 PROCEDURE — 0002A SARS-COV-2 / COVID-19 MRNA VACCINE (PFIZER-BIONTECH) 30 MCG: CPT

## 2021-04-19 PROCEDURE — 91300 SARS-COV-2 / COVID-19 MRNA VACCINE (PFIZER-BIONTECH) 30 MCG: CPT

## 2021-05-17 DIAGNOSIS — I10 ESSENTIAL HYPERTENSION: ICD-10-CM

## 2021-05-17 RX ORDER — LOSARTAN POTASSIUM 50 MG/1
50 TABLET ORAL DAILY
Qty: 90 TABLET | Refills: 3 | Status: SHIPPED | OUTPATIENT
Start: 2021-05-17 | End: 2021-11-12 | Stop reason: SDUPTHER

## 2021-11-12 ENCOUNTER — OFFICE VISIT (OUTPATIENT)
Dept: FAMILY MEDICINE CLINIC | Facility: MEDICAL CENTER | Age: 46
End: 2021-11-12
Payer: COMMERCIAL

## 2021-11-12 VITALS
OXYGEN SATURATION: 92 % | TEMPERATURE: 98.4 F | HEART RATE: 72 BPM | DIASTOLIC BLOOD PRESSURE: 86 MMHG | SYSTOLIC BLOOD PRESSURE: 124 MMHG | WEIGHT: 219.6 LBS | HEIGHT: 70 IN | BODY MASS INDEX: 31.44 KG/M2

## 2021-11-12 DIAGNOSIS — I10 ESSENTIAL HYPERTENSION: Primary | ICD-10-CM

## 2021-11-12 DIAGNOSIS — Z13.220 SCREENING FOR LIPID DISORDERS: ICD-10-CM

## 2021-11-12 DIAGNOSIS — K21.9 GASTROESOPHAGEAL REFLUX DISEASE: ICD-10-CM

## 2021-11-12 DIAGNOSIS — Z79.899 LONG-TERM CURRENT USE OF BENZODIAZEPINE: ICD-10-CM

## 2021-11-12 DIAGNOSIS — Z13.89 SCREENING FOR BLOOD OR PROTEIN IN URINE: ICD-10-CM

## 2021-11-12 DIAGNOSIS — F41.9 ANXIETY: ICD-10-CM

## 2021-11-12 PROBLEM — Z11.59 NEED FOR HEPATITIS C SCREENING TEST: Status: ACTIVE | Noted: 2021-11-12

## 2021-11-12 PROBLEM — Z11.4 SCREENING FOR HIV (HUMAN IMMUNODEFICIENCY VIRUS): Status: RESOLVED | Noted: 2021-11-12 | Resolved: 2021-11-12

## 2021-11-12 PROBLEM — Z23 ENCOUNTER FOR IMMUNIZATION: Status: ACTIVE | Noted: 2021-11-12

## 2021-11-12 PROBLEM — Z11.4 SCREENING FOR HIV (HUMAN IMMUNODEFICIENCY VIRUS): Status: ACTIVE | Noted: 2021-11-12

## 2021-11-12 PROBLEM — Z11.59 NEED FOR HEPATITIS C SCREENING TEST: Status: RESOLVED | Noted: 2021-11-12 | Resolved: 2021-11-12

## 2021-11-12 PROCEDURE — 99214 OFFICE O/P EST MOD 30 MIN: CPT | Performed by: NURSE PRACTITIONER

## 2021-11-12 PROCEDURE — 99386 PREV VISIT NEW AGE 40-64: CPT | Performed by: NURSE PRACTITIONER

## 2021-11-12 RX ORDER — PANTOPRAZOLE SODIUM 40 MG/1
40 TABLET, DELAYED RELEASE ORAL DAILY
Qty: 90 TABLET | Refills: 3 | Status: SHIPPED | OUTPATIENT
Start: 2021-11-12

## 2021-11-12 RX ORDER — LOSARTAN POTASSIUM 50 MG/1
50 TABLET ORAL DAILY
Qty: 90 TABLET | Refills: 3 | Status: SHIPPED | OUTPATIENT
Start: 2021-11-12

## 2021-11-12 RX ORDER — ALPRAZOLAM 0.25 MG/1
0.25 TABLET ORAL 3 TIMES DAILY PRN
Qty: 30 TABLET | Refills: 0 | Status: SHIPPED | OUTPATIENT
Start: 2021-11-12

## 2021-12-07 ENCOUNTER — TELEMEDICINE (OUTPATIENT)
Dept: FAMILY MEDICINE CLINIC | Facility: MEDICAL CENTER | Age: 46
End: 2021-12-07
Payer: COMMERCIAL

## 2021-12-07 DIAGNOSIS — R09.82 POST-NASAL DRIP: Primary | ICD-10-CM

## 2021-12-07 DIAGNOSIS — B34.9 VIRAL INFECTION, UNSPECIFIED: ICD-10-CM

## 2021-12-07 DIAGNOSIS — Z20.822 EXPOSURE TO COVID-19 VIRUS: ICD-10-CM

## 2021-12-07 PROCEDURE — 99214 OFFICE O/P EST MOD 30 MIN: CPT | Performed by: NURSE PRACTITIONER

## 2021-12-08 PROCEDURE — U0003 INFECTIOUS AGENT DETECTION BY NUCLEIC ACID (DNA OR RNA); SEVERE ACUTE RESPIRATORY SYNDROME CORONAVIRUS 2 (SARS-COV-2) (CORONAVIRUS DISEASE [COVID-19]), AMPLIFIED PROBE TECHNIQUE, MAKING USE OF HIGH THROUGHPUT TECHNOLOGIES AS DESCRIBED BY CMS-2020-01-R: HCPCS | Performed by: NURSE PRACTITIONER

## 2021-12-08 PROCEDURE — U0005 INFEC AGEN DETEC AMPLI PROBE: HCPCS | Performed by: NURSE PRACTITIONER

## 2021-12-09 ENCOUNTER — TELEMEDICINE (OUTPATIENT)
Dept: FAMILY MEDICINE CLINIC | Facility: CLINIC | Age: 46
End: 2021-12-09
Payer: COMMERCIAL

## 2021-12-09 VITALS — TEMPERATURE: 99.9 F

## 2021-12-09 DIAGNOSIS — B34.9 VIRAL INFECTION, UNSPECIFIED: ICD-10-CM

## 2021-12-09 DIAGNOSIS — J06.9 UPPER RESPIRATORY INFECTION WITH COUGH AND CONGESTION: Primary | ICD-10-CM

## 2021-12-09 DIAGNOSIS — E66.09 CLASS 1 OBESITY DUE TO EXCESS CALORIES WITH SERIOUS COMORBIDITY AND BODY MASS INDEX (BMI) OF 31.0 TO 31.9 IN ADULT: ICD-10-CM

## 2021-12-09 DIAGNOSIS — R05.9 COUGH: ICD-10-CM

## 2021-12-09 PROBLEM — E66.811 CLASS 1 OBESITY DUE TO EXCESS CALORIES WITH SERIOUS COMORBIDITY AND BODY MASS INDEX (BMI) OF 31.0 TO 31.9 IN ADULT: Status: ACTIVE | Noted: 2021-12-09

## 2021-12-09 PROCEDURE — 99214 OFFICE O/P EST MOD 30 MIN: CPT | Performed by: NURSE PRACTITIONER

## 2021-12-09 RX ORDER — SODIUM CHLORIDE 9 MG/ML
20 INJECTION, SOLUTION INTRAVENOUS ONCE
Status: CANCELLED | OUTPATIENT
Start: 2021-12-09

## 2021-12-09 RX ORDER — ALBUTEROL SULFATE 90 UG/1
3 AEROSOL, METERED RESPIRATORY (INHALATION) ONCE AS NEEDED
Status: CANCELLED | OUTPATIENT
Start: 2021-12-09

## 2021-12-09 RX ORDER — ONDANSETRON 2 MG/ML
4 INJECTION INTRAMUSCULAR; INTRAVENOUS ONCE AS NEEDED
Status: CANCELLED | OUTPATIENT
Start: 2021-12-09

## 2021-12-09 RX ORDER — PREDNISONE 20 MG/1
40 TABLET ORAL DAILY
Qty: 10 TABLET | Refills: 0 | Status: SHIPPED | OUTPATIENT
Start: 2021-12-09 | End: 2021-12-14

## 2021-12-09 RX ORDER — ACETAMINOPHEN 325 MG/1
650 TABLET ORAL ONCE AS NEEDED
Status: CANCELLED | OUTPATIENT
Start: 2021-12-09

## 2021-12-09 RX ORDER — AZITHROMYCIN 250 MG/1
TABLET, FILM COATED ORAL
Qty: 6 TABLET | Refills: 0 | Status: SHIPPED | OUTPATIENT
Start: 2021-12-09 | End: 2021-12-14

## 2021-12-09 RX ORDER — ALBUTEROL SULFATE 90 UG/1
2 AEROSOL, METERED RESPIRATORY (INHALATION) EVERY 6 HOURS PRN
Qty: 8.5 G | Refills: 0 | Status: SHIPPED | OUTPATIENT
Start: 2021-12-09

## 2021-12-12 ENCOUNTER — HOSPITAL ENCOUNTER (OUTPATIENT)
Dept: INFUSION CENTER | Facility: HOSPITAL | Age: 46
Discharge: HOME | End: 2021-12-12
Payer: COMMERCIAL

## 2021-12-12 VITALS
DIASTOLIC BLOOD PRESSURE: 80 MMHG | SYSTOLIC BLOOD PRESSURE: 127 MMHG | TEMPERATURE: 97.3 F | RESPIRATION RATE: 16 BRPM | OXYGEN SATURATION: 97 % | HEART RATE: 65 BPM

## 2021-12-12 DIAGNOSIS — J06.9 UPPER RESPIRATORY INFECTION WITH COUGH AND CONGESTION: ICD-10-CM

## 2021-12-12 DIAGNOSIS — R05.9 COUGH: Primary | ICD-10-CM

## 2021-12-12 PROCEDURE — M0245 HB BAMLAN AND ETESEV INF ADMIN: HCPCS | Performed by: FAMILY MEDICINE

## 2021-12-12 RX ORDER — ONDANSETRON 2 MG/ML
4 INJECTION INTRAMUSCULAR; INTRAVENOUS ONCE AS NEEDED
Status: DISCONTINUED | OUTPATIENT
Start: 2021-12-12 | End: 2021-12-15 | Stop reason: HOSPADM

## 2021-12-12 RX ORDER — ONDANSETRON 2 MG/ML
4 INJECTION INTRAMUSCULAR; INTRAVENOUS ONCE AS NEEDED
Status: CANCELLED | OUTPATIENT
Start: 2021-12-12

## 2021-12-12 RX ORDER — ACETAMINOPHEN 325 MG/1
650 TABLET ORAL ONCE AS NEEDED
Status: DISCONTINUED | OUTPATIENT
Start: 2021-12-12 | End: 2021-12-15 | Stop reason: HOSPADM

## 2021-12-12 RX ORDER — SODIUM CHLORIDE 9 MG/ML
20 INJECTION, SOLUTION INTRAVENOUS ONCE
Status: CANCELLED | OUTPATIENT
Start: 2021-12-12

## 2021-12-12 RX ORDER — ACETAMINOPHEN 325 MG/1
650 TABLET ORAL ONCE AS NEEDED
Status: CANCELLED | OUTPATIENT
Start: 2021-12-12

## 2021-12-12 RX ORDER — ALBUTEROL SULFATE 90 UG/1
3 AEROSOL, METERED RESPIRATORY (INHALATION) ONCE AS NEEDED
Status: CANCELLED | OUTPATIENT
Start: 2021-12-12

## 2021-12-12 RX ORDER — SODIUM CHLORIDE 9 MG/ML
20 INJECTION, SOLUTION INTRAVENOUS ONCE
Status: COMPLETED | OUTPATIENT
Start: 2021-12-12 | End: 2021-12-12

## 2021-12-12 RX ORDER — ALBUTEROL SULFATE 90 UG/1
3 AEROSOL, METERED RESPIRATORY (INHALATION) ONCE AS NEEDED
Status: DISCONTINUED | OUTPATIENT
Start: 2021-12-12 | End: 2021-12-15 | Stop reason: HOSPADM

## 2021-12-12 RX ADMIN — SODIUM CHLORIDE 2100 MG COMBINED: 9 INJECTION, SOLUTION INTRAVENOUS at 09:42

## 2021-12-12 RX ADMIN — SODIUM CHLORIDE 20 ML/HR: 9 INJECTION, SOLUTION INTRAVENOUS at 09:41

## 2022-01-03 ENCOUNTER — TELEPHONE (OUTPATIENT)
Dept: FAMILY MEDICINE CLINIC | Facility: MEDICAL CENTER | Age: 47
End: 2022-01-03

## 2022-01-03 ENCOUNTER — OFFICE VISIT (OUTPATIENT)
Dept: FAMILY MEDICINE CLINIC | Facility: MEDICAL CENTER | Age: 47
End: 2022-01-03
Payer: COMMERCIAL

## 2022-01-03 VITALS
HEIGHT: 70 IN | TEMPERATURE: 97.6 F | BODY MASS INDEX: 31.07 KG/M2 | DIASTOLIC BLOOD PRESSURE: 80 MMHG | HEART RATE: 70 BPM | SYSTOLIC BLOOD PRESSURE: 116 MMHG | WEIGHT: 217 LBS

## 2022-01-03 DIAGNOSIS — J31.0 RHINOSINUSITIS: Primary | ICD-10-CM

## 2022-01-03 DIAGNOSIS — J32.9 RHINOSINUSITIS: Primary | ICD-10-CM

## 2022-01-03 PROCEDURE — 99213 OFFICE O/P EST LOW 20 MIN: CPT | Performed by: FAMILY MEDICINE

## 2022-01-03 RX ORDER — CEFPROZIL 250 MG/1
250 TABLET, FILM COATED ORAL 2 TIMES DAILY
Qty: 20 TABLET | Refills: 0 | Status: SHIPPED | OUTPATIENT
Start: 2022-01-03 | End: 2022-01-13

## 2022-01-03 NOTE — TELEPHONE ENCOUNTER
Pt's wife called to say the pt is having clogged ears, fever, sinus pressure, post nasal drip, sinus headache  Pt had Covid on 12/6  He doesn't feel like this is Covid  He had the antibody therapy  He was told to call if symptoms lingered from before  Worse symptoms today  Wants to know if Covid testing is needed  Pt vaccinated  Please, advise pt

## 2022-01-03 NOTE — PROGRESS NOTES
Complains of headaches over his ethmoid sinus, postnasal drip and both ears with fullness  It has been there for about a couple days  He is convalescing from [facility] [place] last month  He does not have chest pain, shortness of breath, wheezing,  Past medical history, past surgical history, family medical history, social history, and medication list were all reviewed  /80 (BP Location: Left arm, Patient Position: Sitting, Cuff Size: Adult)   Pulse 70   Temp 97 6 °F (36 4 °C)   Ht 5' 10" (1 778 m)   Wt 98 4 kg (217 lb)   BMI 31 14 kg/m²     Physical Exam  HENT:      Right Ear: Hearing, tympanic membrane, ear canal and external ear normal       Left Ear: Hearing, tympanic membrane, ear canal and external ear normal       Nose: Rhinorrhea present  No mucosal edema  Mouth/Throat:      Mouth: Mucous membranes are moist       Pharynx: Posterior oropharyngeal erythema present  No oropharyngeal exudate (Lymphoid hyperplasia)  Eyes:      Conjunctiva/sclera: Conjunctivae normal       Pupils: Pupils are equal, round, and reactive to light  Neck:      Thyroid: No thyromegaly  Cardiovascular:      Rate and Rhythm: Normal rate and regular rhythm  Heart sounds: Normal heart sounds  Pulmonary:      Effort: Pulmonary effort is normal  No accessory muscle usage or respiratory distress  Breath sounds: Normal breath sounds  No wheezing or rales  Lymphadenopathy:      Cervical: No cervical adenopathy  Rhinosinusitis    Continue his fluticasone nasal spray  Will prescribe cefprozil  Continue also p r n   DayQuil and NyQuil

## 2022-01-06 ENCOUNTER — TELEPHONE (OUTPATIENT)
Dept: FAMILY MEDICINE CLINIC | Facility: MEDICAL CENTER | Age: 47
End: 2022-01-06

## 2022-01-06 DIAGNOSIS — J06.9 UPPER RESPIRATORY INFECTION WITH COUGH AND CONGESTION: Primary | ICD-10-CM

## 2022-01-06 RX ORDER — AZITHROMYCIN 250 MG/1
TABLET, FILM COATED ORAL
Qty: 6 TABLET | Refills: 0 | Status: SHIPPED | OUTPATIENT
Start: 2022-01-06 | End: 2022-01-11

## 2022-01-06 NOTE — TELEPHONE ENCOUNTER
Pt's wife called and unfortunately ias not on consent  I spoke to Ranjana Lion, not tolerating Cefzil  Abdominal cramping, persistent diarrhea, headache for an hour after taking the medication  Dr Gala Concepcion is not in so spoke to Dr Lilliam Hobbs  D/C Cefsil and start Z-pack  He had Z-pack previously with no issues   It was sent to pharmacy, pt aware

## 2022-01-31 ENCOUNTER — TELEPHONE (OUTPATIENT)
Dept: FAMILY MEDICINE CLINIC | Facility: MEDICAL CENTER | Age: 47
End: 2022-01-31

## 2022-01-31 ENCOUNTER — APPOINTMENT (OUTPATIENT)
Dept: LAB | Facility: MEDICAL CENTER | Age: 47
End: 2022-01-31
Payer: COMMERCIAL

## 2022-01-31 DIAGNOSIS — Z13.220 SCREENING FOR LIPID DISORDERS: ICD-10-CM

## 2022-01-31 DIAGNOSIS — I10 ESSENTIAL HYPERTENSION: ICD-10-CM

## 2022-01-31 LAB
ALBUMIN SERPL BCP-MCNC: 3.8 G/DL (ref 3.5–5)
ALP SERPL-CCNC: 59 U/L (ref 46–116)
ALT SERPL W P-5'-P-CCNC: 47 U/L (ref 12–78)
ANION GAP SERPL CALCULATED.3IONS-SCNC: 5 MMOL/L (ref 4–13)
AST SERPL W P-5'-P-CCNC: 22 U/L (ref 5–45)
BACTERIA UR QL AUTO: NORMAL /HPF
BASOPHILS # BLD AUTO: 0.09 THOUSANDS/ΜL (ref 0–0.1)
BASOPHILS NFR BLD AUTO: 1 % (ref 0–1)
BILIRUB SERPL-MCNC: 0.34 MG/DL (ref 0.2–1)
BILIRUB UR QL STRIP: NEGATIVE
BUN SERPL-MCNC: 15 MG/DL (ref 5–25)
CALCIUM SERPL-MCNC: 9.4 MG/DL (ref 8.3–10.1)
CHLORIDE SERPL-SCNC: 107 MMOL/L (ref 100–108)
CHOLEST SERPL-MCNC: 192 MG/DL
CLARITY UR: CLEAR
CO2 SERPL-SCNC: 27 MMOL/L (ref 21–32)
COLOR UR: YELLOW
CREAT SERPL-MCNC: 1.19 MG/DL (ref 0.6–1.3)
EOSINOPHIL # BLD AUTO: 0.32 THOUSAND/ΜL (ref 0–0.61)
EOSINOPHIL NFR BLD AUTO: 4 % (ref 0–6)
ERYTHROCYTE [DISTWIDTH] IN BLOOD BY AUTOMATED COUNT: 13.9 % (ref 11.6–15.1)
GFR SERPL CREATININE-BSD FRML MDRD: 72 ML/MIN/1.73SQ M
GLUCOSE P FAST SERPL-MCNC: 98 MG/DL (ref 65–99)
GLUCOSE UR STRIP-MCNC: NEGATIVE MG/DL
HCT VFR BLD AUTO: 48.9 % (ref 36.5–49.3)
HDLC SERPL-MCNC: 35 MG/DL
HGB BLD-MCNC: 15.6 G/DL (ref 12–17)
HGB UR QL STRIP.AUTO: NEGATIVE
HYALINE CASTS #/AREA URNS LPF: NORMAL /LPF
IMM GRANULOCYTES # BLD AUTO: 0.02 THOUSAND/UL (ref 0–0.2)
IMM GRANULOCYTES NFR BLD AUTO: 0 % (ref 0–2)
KETONES UR STRIP-MCNC: NEGATIVE MG/DL
LDLC SERPL CALC-MCNC: 127 MG/DL (ref 0–100)
LEUKOCYTE ESTERASE UR QL STRIP: NEGATIVE
LYMPHOCYTES # BLD AUTO: 3.02 THOUSANDS/ΜL (ref 0.6–4.47)
LYMPHOCYTES NFR BLD AUTO: 35 % (ref 14–44)
MCH RBC QN AUTO: 26.9 PG (ref 26.8–34.3)
MCHC RBC AUTO-ENTMCNC: 31.9 G/DL (ref 31.4–37.4)
MCV RBC AUTO: 84 FL (ref 82–98)
MONOCYTES # BLD AUTO: 0.66 THOUSAND/ΜL (ref 0.17–1.22)
MONOCYTES NFR BLD AUTO: 8 % (ref 4–12)
NEUTROPHILS # BLD AUTO: 4.62 THOUSANDS/ΜL (ref 1.85–7.62)
NEUTS SEG NFR BLD AUTO: 52 % (ref 43–75)
NITRITE UR QL STRIP: NEGATIVE
NON-SQ EPI CELLS URNS QL MICRO: NORMAL /HPF
NONHDLC SERPL-MCNC: 157 MG/DL
NRBC BLD AUTO-RTO: 0 /100 WBCS
PH UR STRIP.AUTO: 6.5 [PH]
PLATELET # BLD AUTO: 362 THOUSANDS/UL (ref 149–390)
PMV BLD AUTO: 10.2 FL (ref 8.9–12.7)
POTASSIUM SERPL-SCNC: 5.1 MMOL/L (ref 3.5–5.3)
PROT SERPL-MCNC: 7.3 G/DL (ref 6.4–8.2)
PROT UR STRIP-MCNC: ABNORMAL MG/DL
RBC # BLD AUTO: 5.8 MILLION/UL (ref 3.88–5.62)
RBC #/AREA URNS AUTO: NORMAL /HPF
SODIUM SERPL-SCNC: 139 MMOL/L (ref 136–145)
SP GR UR STRIP.AUTO: >=1.03 (ref 1–1.03)
TRIGL SERPL-MCNC: 151 MG/DL
UROBILINOGEN UR QL STRIP.AUTO: 0.2 E.U./DL
WBC # BLD AUTO: 8.73 THOUSAND/UL (ref 4.31–10.16)
WBC #/AREA URNS AUTO: NORMAL /HPF

## 2022-01-31 PROCEDURE — 80053 COMPREHEN METABOLIC PANEL: CPT

## 2022-01-31 PROCEDURE — 36415 COLL VENOUS BLD VENIPUNCTURE: CPT

## 2022-01-31 PROCEDURE — 80061 LIPID PANEL: CPT

## 2022-01-31 PROCEDURE — 81001 URINALYSIS AUTO W/SCOPE: CPT | Performed by: NURSE PRACTITIONER

## 2022-01-31 PROCEDURE — 80307 DRUG TEST PRSMV CHEM ANLYZR: CPT | Performed by: NURSE PRACTITIONER

## 2022-01-31 PROCEDURE — 85025 COMPLETE CBC W/AUTO DIFF WBC: CPT

## 2022-01-31 NOTE — TELEPHONE ENCOUNTER
Pt sent the following My chart request:        Appointment Request From: Anne Marie Ward     With Provider: Nandini Means MD [[address]     Preferred Date Range: 2/7/2022 - 2/11/2022     Preferred Times: Any Time     Reason for visit: Primary Care Office Visit     Comments:  Appointment to discuss lab results      Left pt voicemail to return call

## 2022-02-01 ENCOUNTER — TELEPHONE (OUTPATIENT)
Dept: FAMILY MEDICINE CLINIC | Facility: MEDICAL CENTER | Age: 47
End: 2022-02-01

## 2022-02-01 LAB
AMPHETAMINES UR QL SCN: NEGATIVE NG/ML
BARBITURATES UR QL SCN: NEGATIVE NG/ML
BENZODIAZ UR QL: NEGATIVE NG/ML
BZE UR QL: NEGATIVE NG/ML
CANNABINOIDS UR QL SCN: NEGATIVE NG/ML
METHADONE UR QL SCN: NEGATIVE NG/ML
OPIATES UR QL: NEGATIVE NG/ML
PCP UR QL: NEGATIVE NG/ML
PROPOXYPH UR QL SCN: NEGATIVE NG/ML

## 2022-02-01 NOTE — TELEPHONE ENCOUNTER
----- Message from Bonny Lea, [address] sent at 1/31/2022  4:49 PM EST -----  Please call patient and notify test results are normal

## 2022-04-01 ENCOUNTER — CLINICAL SUPPORT (OUTPATIENT)
Dept: FAMILY MEDICINE CLINIC | Facility: MEDICAL CENTER | Age: 47
End: 2022-04-01

## 2022-04-01 DIAGNOSIS — R68.89 FLU-LIKE SYMPTOMS: Primary | ICD-10-CM

## 2022-04-01 PROCEDURE — U0003 INFECTIOUS AGENT DETECTION BY NUCLEIC ACID (DNA OR RNA); SEVERE ACUTE RESPIRATORY SYNDROME CORONAVIRUS 2 (SARS-COV-2) (CORONAVIRUS DISEASE [COVID-19]), AMPLIFIED PROBE TECHNIQUE, MAKING USE OF HIGH THROUGHPUT TECHNOLOGIES AS DESCRIBED BY CMS-2020-01-R: HCPCS | Performed by: FAMILY MEDICINE

## 2022-04-01 PROCEDURE — U0005 INFEC AGEN DETEC AMPLI PROBE: HCPCS | Performed by: FAMILY MEDICINE

## 2022-04-02 LAB — SARS-COV-2 RNA RESP QL NAA+PROBE: NEGATIVE

## 2022-04-21 ENCOUNTER — TELEPHONE (OUTPATIENT)
Dept: FAMILY MEDICINE CLINIC | Facility: MEDICAL CENTER | Age: 47
End: 2022-04-21

## 2022-04-21 ENCOUNTER — OFFICE VISIT (OUTPATIENT)
Dept: FAMILY MEDICINE CLINIC | Facility: MEDICAL CENTER | Age: 47
End: 2022-04-21
Payer: COMMERCIAL

## 2022-04-21 VITALS
WEIGHT: 222.2 LBS | HEIGHT: 70 IN | SYSTOLIC BLOOD PRESSURE: 120 MMHG | TEMPERATURE: 98.2 F | HEART RATE: 87 BPM | OXYGEN SATURATION: 98 % | DIASTOLIC BLOOD PRESSURE: 82 MMHG | BODY MASS INDEX: 31.81 KG/M2

## 2022-04-21 DIAGNOSIS — J30.1 CHRONIC SEASONAL ALLERGIC RHINITIS DUE TO POLLEN: ICD-10-CM

## 2022-04-21 DIAGNOSIS — J02.9 SORE THROAT: Primary | ICD-10-CM

## 2022-04-21 LAB — S PYO AG THROAT QL: NEGATIVE

## 2022-04-21 PROCEDURE — 87880 STREP A ASSAY W/OPTIC: CPT | Performed by: FAMILY MEDICINE

## 2022-04-21 PROCEDURE — 99213 OFFICE O/P EST LOW 20 MIN: CPT | Performed by: FAMILY MEDICINE

## 2022-04-21 RX ORDER — MONTELUKAST SODIUM 10 MG/1
10 TABLET ORAL
Qty: 30 TABLET | Refills: 5 | Status: SHIPPED | OUTPATIENT
Start: 2022-04-21

## 2022-04-21 NOTE — TELEPHONE ENCOUNTER
Pt's wife called  She said he has a swollen tonsil on both sides  One side is bigger that the other  It started 3 days ago  He gets tonsil stones and he thinks that's what this might be but he can't get it out  He has sore throat due to this  Please, advise

## 2022-04-25 NOTE — PROGRESS NOTES
Patient has a long history of congestion and cough  He also tells me he has chronic recurrent sinusitis  Today he is complaining of a thick slightly sore throat  He also has postnasal drip  No fevers    He declines shortness of breath chest pain or bad cough  /82 (BP Location: Left arm, Patient Position: Sitting, Cuff Size: Adult)   Pulse 87   Temp 98 2 °F (36 8 °C)   Ht 5' 10" (1 778 m)   Wt 101 kg (222 lb 3 2 oz)   SpO2 98%   BMI 31 88 kg/m²     Physical Exam  HENT:      Right Ear: Hearing, tympanic membrane, ear canal and external ear normal       Left Ear: Hearing, tympanic membrane, ear canal and external ear normal       Nose: No mucosal edema or rhinorrhea  Mouth/Throat:      Mouth: Mucous membranes are moist  No oral lesions  Pharynx: Uvula midline  Pharyngeal swelling and posterior oropharyngeal erythema present  No oropharyngeal exudate  Tonsils: No tonsillar exudate or tonsillar abscesses  Eyes:      Conjunctiva/sclera: Conjunctivae normal       Pupils: Pupils are equal, round, and reactive to light  Neck:      Thyroid: No thyromegaly  Cardiovascular:      Rate and Rhythm: Normal rate and regular rhythm  Heart sounds: Normal heart sounds  Pulmonary:      Effort: Pulmonary effort is normal  No accessory muscle usage or respiratory distress  Breath sounds: Normal breath sounds  No wheezing, rhonchi or rales  Lymphadenopathy:      Cervical: No cervical adenopathy  Allergic rhinitis  Flonase, Singulair  Also aline HENDRICKSON  call back if that is not effective

## 2022-05-10 ENCOUNTER — OFFICE VISIT (OUTPATIENT)
Dept: URGENT CARE | Facility: MEDICAL CENTER | Age: 47
End: 2022-05-10
Payer: COMMERCIAL

## 2022-05-10 VITALS
BODY MASS INDEX: 31.21 KG/M2 | SYSTOLIC BLOOD PRESSURE: 137 MMHG | HEART RATE: 82 BPM | WEIGHT: 218 LBS | HEIGHT: 70 IN | OXYGEN SATURATION: 95 % | RESPIRATION RATE: 20 BRPM | TEMPERATURE: 97.8 F | DIASTOLIC BLOOD PRESSURE: 80 MMHG

## 2022-05-10 DIAGNOSIS — S01.01XA LACERATION OF SCALP, INITIAL ENCOUNTER: Primary | ICD-10-CM

## 2022-05-10 PROCEDURE — 90471 IMMUNIZATION ADMIN: CPT | Performed by: PHYSICIAN ASSISTANT

## 2022-05-10 PROCEDURE — 90715 TDAP VACCINE 7 YRS/> IM: CPT

## 2022-05-10 PROCEDURE — 12001 RPR S/N/AX/GEN/TRNK 2.5CM/<: CPT | Performed by: PHYSICIAN ASSISTANT

## 2022-05-10 PROCEDURE — 99213 OFFICE O/P EST LOW 20 MIN: CPT | Performed by: PHYSICIAN ASSISTANT

## 2022-05-10 NOTE — PATIENT INSTRUCTIONS
1  Over-the-counter ibuprofen and/or acetaminophen as needed for pain  2  Clean wound twice daily with a 1:1 mixture peroxide and water  Remove all scabbing debris carefully each time you do it  Blot the wound dry and apply Neosporin or bacitracin  3  Return here immediately for any signs of infection  4  Go to the ER immediately for any signs of closed head injury as discussed    5  Return here or see her primary care doctor in 1 week for staple removal

## 2022-05-10 NOTE — PROGRESS NOTES
[facility]        NAME: Jenny Sheehan is a 56 y o  male  : 1975    MRN: [id number]  DATE: May 10, 2022  TIME: 3:49 PM    Assessment and Plan   Laceration of scalp, initial encounter [S01 01XA]  1  Laceration of scalp, initial encounter  Tdap Vaccine greater than or equal to 8yo         Patient Instructions   1  Over-the-counter ibuprofen and/or acetaminophen as needed for pain  2  Clean wound twice daily with a 1:1 mixture peroxide and water  Remove all scabbing debris carefully each time you do it  Blot the wound dry and apply Neosporin or bacitracin  3  Return here immediately for any signs of infection  4  Go to the ER immediately for any signs of closed head injury as discussed  5  Return here or see her primary care doctor in 1 week for staple removal             Chief Complaint     Chief Complaint   Patient presents with    Head Laceration     head laceration about an hour ago hit head on lawnmower         History of Present Illness       43-year-old male patient with a 1 hour history of right-sided temporal scalp pain in bleeding after the duarte to his lawnmower accidentally hit him in the head  There was no loss of consciousness, headache, dizziness, visual change, nausea or vomiting  There is no neck pain  Tetanus was approximately 10 years ago  Review of Systems   Review of Systems   Constitutional: Negative for chills and fever  HENT: Negative for ear pain and sore throat  Eyes: Negative for pain and visual disturbance  Respiratory: Negative for cough and shortness of breath  Cardiovascular: Negative for chest pain and palpitations  Gastrointestinal: Negative for abdominal pain, nausea and vomiting  Genitourinary: Negative for dysuria and hematuria  Musculoskeletal: Negative for arthralgias and back pain  Skin: Positive for wound  Negative for color change and rash     Neurological: Negative for dizziness, seizures, syncope, light-headedness and headaches  All other systems reviewed and are negative          Current Medications       Current Outpatient Medications:     ALPRAZolam (XANAX) 0 25 mg tablet, Take 1 tablet (0 25 mg total) by mouth 3 (three) times a day as needed for anxiety, Disp: 30 tablet, Rfl: 0    fluticasone (FLONASE) 50 mcg/act nasal spray, 1 spray into each nostril daily, Disp: , Rfl:     Ibuprofen (ADVIL) 200 MG CAPS, Take 1 tablet by mouth 2 (two) times a day , Disp: , Rfl:     losartan (COZAAR) 50 mg tablet, Take 1 tablet (50 mg total) by mouth daily, Disp: 90 tablet, Rfl: 3    montelukast (SINGULAIR) 10 mg tablet, Take 1 tablet (10 mg total) by mouth daily at bedtime, Disp: 30 tablet, Rfl: 5    pantoprazole (PROTONIX) 40 mg tablet, Take 1 tablet (40 mg total) by mouth daily, Disp: 90 tablet, Rfl: 3    albuterol (ProAir HFA) 90 mcg/act inhaler, Inhale 2 puffs every 6 (six) hours as needed for wheezing (Patient not taking: Reported on 5/10/2022 ), Disp: 8 5 g, Rfl: 0    fexofenadine-pseudoephedrine (ALLEGRA-D)  MG per tablet, Take 1 tablet by mouth daily (Patient not taking: Reported on 5/10/2022 ), Disp: , Rfl:     Current Allergies     Allergies as of 05/10/2022 - Reviewed 05/10/2022   Allergen Reaction Noted    Levaquin [levofloxacin] GI Intolerance and Nausea Only 02/09/2015    Rosuvastatin Myalgia 06/29/2016            The following portions of the patient's history were reviewed and updated as appropriate: allergies, current medications, past family history, past medical history, past social history, past surgical history and problem list      Past Medical History:   Diagnosis Date    Back pain     GERD (gastroesophageal reflux disease)        Past Surgical History:   Procedure Laterality Date    APPENDECTOMY      CHOLECYSTECTOMY      HERNIA REPAIR      inguinal/umbilical - last assessed 6/29/16    [place] EXC SKIN BENIG 1 1-2 CM TRUNK,ARM,LEG Right 1/20/2016    Procedure: REVISION SCAR ABDOMINAL Exploration of painful scar on right upper abdomen;  Surgeon: Yesy Velazquez MD;  Location: [address] [place] [address];  Service: General    [address] [place] [address] N/A 1/20/2016    Procedure: REPAIR HERNIA VENTRAL (SMALL EPIGASTRIC HERNIA); Surgeon: Yesy Velazquez MD;  Location: [facility];  Service: General       Family History   Problem Relation Age of Onset    Cancer Mother         lung    Hypertension Mother     Arthritis Mother     Hypertension Father     Hiatal hernia Father     Arthritis Father     Anxiety disorder Father     Cancer Maternal Uncle          Medications have been verified  Objective   /80   Pulse 82   Temp 97 8 °F (36 6 °C)   Resp 20   Ht 5' 10" (1 778 m)   Wt 98 9 kg (218 lb)   SpO2 95%   BMI 31 28 kg/m²        Physical Exam     Physical Exam  Vitals and nursing note reviewed  Constitutional:       Appearance: Normal appearance  HENT:      Head: Normocephalic  Nose: Nose normal       Mouth/Throat:      Mouth: Mucous membranes are dry  Pharynx: Oropharynx is clear  Eyes:      Conjunctiva/sclera: Conjunctivae normal       Pupils: Pupils are equal, round, and reactive to light  Cardiovascular:      Rate and Rhythm: Normal rate and regular rhythm  Pulses: Normal pulses  Pulmonary:      Effort: Pulmonary effort is normal       Breath sounds: Normal breath sounds  Musculoskeletal:         General: Normal range of motion  Cervical back: Normal range of motion and neck supple  Skin:     General: Skin is warm and dry  Neurological:      General: No focal deficit present  Mental Status: He is alert and oriented to person, place, and time  Cranial Nerves: No cranial nerve deficit  Sensory: No sensory deficit        Gait: Gait normal    Psychiatric:         Mood and Affect: Mood normal          Behavior: Behavior normal          Laceration repair    Date/Time: 5/10/2022 3:47 PM  Performed by: Tri Coppola PA-C  Authorized by: Joshua Martines Katherine Peacock PA-C   Consent: Verbal consent obtained    Risks and benefits: risks, benefits and alternatives were discussed  Consent given by: patient  Patient understanding: patient states understanding of the procedure being performed  Patient identity confirmed: verbally with patient  Body area: head/neck  Laceration length: 2 cm  Foreign bodies: no foreign bodies  Tendon involvement: none  Nerve involvement: none  Vascular damage: no  Anesthesia: local infiltration    Anesthesia:  Local Anesthetic: lidocaine 1% with epinephrine  Anesthetic total: 2 mL    Sedation:  Patient sedated: no      Wound Dehiscence:  Superficial Wound Dehiscence: simple closure      Procedure Details:  Amount of cleaning: standard  Debridement: none  Degree of undermining: none  Skin closure: staples  Number of sutures: 4  Approximation: close  Approximation difficulty: simple  Patient tolerance: patient tolerated the procedure well with no immediate complications

## 2022-05-17 ENCOUNTER — OFFICE VISIT (OUTPATIENT)
Dept: URGENT CARE | Facility: MEDICAL CENTER | Age: 47
End: 2022-05-17
Payer: COMMERCIAL

## 2022-05-17 VITALS
HEIGHT: 70 IN | OXYGEN SATURATION: 99 % | HEART RATE: 80 BPM | SYSTOLIC BLOOD PRESSURE: 130 MMHG | DIASTOLIC BLOOD PRESSURE: 80 MMHG | WEIGHT: 218.03 LBS | RESPIRATION RATE: 18 BRPM | BODY MASS INDEX: 31.21 KG/M2

## 2022-05-17 DIAGNOSIS — Z48.02 ENCOUNTER FOR STAPLE REMOVAL: Primary | ICD-10-CM

## 2022-05-17 PROCEDURE — 99212 OFFICE O/P EST SF 10 MIN: CPT | Performed by: PHYSICIAN ASSISTANT

## 2022-05-17 NOTE — PROGRESS NOTES
[facility]        NAME: Nemo Avila is a 56 y o  male  : 1975    MRN: [id number]  DATE: May 17, 2022  TIME: 12:07 PM    Assessment and Plan   Encounter for staple removal [Z48 02]  1  Encounter for staple removal           Patient Instructions     Staple removal   Follow up with PCP in 3-5 days  Proceed to  ER if symptoms worsen  Chief Complaint     Chief Complaint   Patient presents with    Suture / Staple Removal     Suture removal from scalp; placed a week ago          History of Present Illness       60-year-old male who presents for staple removal   Denies pain, discharge, redness      Review of Systems   Review of Systems   Constitutional: Negative  HENT: Negative  Eyes: Negative  Respiratory: Negative  Negative for apnea, cough, choking, chest tightness, shortness of breath, wheezing and stridor  Cardiovascular: Negative  Negative for chest pain           Current Medications       Current Outpatient Medications:     albuterol (ProAir HFA) 90 mcg/act inhaler, Inhale 2 puffs every 6 (six) hours as needed for wheezing (Patient not taking: Reported on 5/10/2022 ), Disp: 8 5 g, Rfl: 0    ALPRAZolam (XANAX) 0 25 mg tablet, Take 1 tablet (0 25 mg total) by mouth 3 (three) times a day as needed for anxiety, Disp: 30 tablet, Rfl: 0    fexofenadine-pseudoephedrine (ALLEGRA-D)  MG per tablet, Take 1 tablet by mouth daily (Patient not taking: Reported on 5/10/2022 ), Disp: , Rfl:     fluticasone (FLONASE) 50 mcg/act nasal spray, 1 spray into each nostril daily, Disp: , Rfl:     Ibuprofen (ADVIL) 200 MG CAPS, Take 1 tablet by mouth 2 (two) times a day , Disp: , Rfl:     losartan (COZAAR) 50 mg tablet, Take 1 tablet (50 mg total) by mouth daily, Disp: 90 tablet, Rfl: 3    montelukast (SINGULAIR) 10 mg tablet, Take 1 tablet (10 mg total) by mouth daily at bedtime, Disp: 30 tablet, Rfl: 5    pantoprazole (PROTONIX) 40 mg tablet, Take 1 tablet (40 mg total) by mouth daily, Disp: 90 tablet, Rfl: 3    Current Allergies     Allergies as of 05/17/2022 - Reviewed 05/17/2022   Allergen Reaction Noted    Levaquin [levofloxacin] GI Intolerance and Nausea Only 02/09/2015    Rosuvastatin Myalgia 06/29/2016            The following portions of the patient's history were reviewed and updated as appropriate: allergies, current medications, past family history, past medical history, past social history, past surgical history and problem list      Past Medical History:   Diagnosis Date    Back pain     GERD (gastroesophageal reflux disease)        Past Surgical History:   Procedure Laterality Date    APPENDECTOMY      CHOLECYSTECTOMY      HERNIA REPAIR      inguinal/umbilical - last assessed 6/29/16    [place] EXC SKIN BENIG 1 1-2 CM TRUNK,ARM,LEG Right 1/20/2016    Procedure: REVISION SCAR ABDOMINAL Exploration of painful scar on right upper abdomen;  Surgeon: Mark Meier MD;  Location: [address] [place] [address];  Service: General    [address] [place] [address] N/A 1/20/2016    Procedure: REPAIR HERNIA VENTRAL (SMALL EPIGASTRIC HERNIA); Surgeon: Mark Meier MD;  Location[facility];  Service: General       Family History   Problem Relation Age of Onset    Cancer Mother         lung    Hypertension Mother     Arthritis Mother     Hypertension Father     Hiatal hernia Father     Arthritis Father     Anxiety disorder Father     Cancer Maternal Uncle          Medications have been verified  Objective   /80   Pulse 80   Resp 18   Ht 5' 10" (1 778 m)   Wt 98 9 kg (218 lb 0 6 oz)   SpO2 99%   BMI 31 28 kg/m²        Physical Exam     Physical Exam  Constitutional:       General: He is not in acute distress  Appearance: Normal appearance  He is well-developed and normal weight  He is not diaphoretic  HENT:      Head: Normocephalic and atraumatic  Cardiovascular:      Rate and Rhythm: Normal rate and regular rhythm  Heart sounds: Normal heart sounds  Pulmonary:      Effort: Pulmonary effort is normal  No respiratory distress  Breath sounds: Normal breath sounds  No stridor  No wheezing, rhonchi or rales  Chest:      Chest wall: No tenderness  Musculoskeletal:      Cervical back: Normal range of motion and neck supple  Lymphadenopathy:      Cervical: No cervical adenopathy  Neurological:      Mental Status: He is alert       Area cleaned and dressed in sterile fashion and staples removed without complication

## 2022-07-15 ENCOUNTER — TELEPHONE (OUTPATIENT)
Dept: FAMILY MEDICINE CLINIC | Facility: MEDICAL CENTER | Age: 47
End: 2022-07-15

## 2022-07-15 NOTE — TELEPHONE ENCOUNTER
Pt's wife called  She said Dr Kay López gave him Singulair almost 2 months ago and he has been having breathing issues  She said it is almost like it is drying him out too much  She also said he is having chest pain that she described as having to do with his breathing  She also said he has not recovered breathing wise since Covid  Please, triage  DC instructions

## 2022-07-15 NOTE — TELEPHONE ENCOUNTER
TRACI Spoke to Dr Moses Vang  Advised at the very least UC or appt next week  Should not be delayed since the schedules are full and it is Friday  I spoke to Magdiel Thacker and she will actually now take him to the ER  She asked me if she went to UC if they would send him to the ER  I advised that could be a possibility depending on findings  She opted to go to ER instead since she now got Dr Belia Meadows input

## 2022-07-15 NOTE — TELEPHONE ENCOUNTER
Pt's wife picked up the phone and put the phone on speaker so I could speak to patient  Chest pain is explained as in the center of the chest  SOB is constant and worsens drastically at bedtime when laying down  He has migraines with visual disturbances  He was winded talking  I advised ER due to all the symptoms  He and his wife declined completely  Recommended UC instead, declined completely, Will only do in office appt  They both believe this is due to the Singulair  Wife was very adamant that she wants Dr Cabrera Foster to answer this message since Dr Gala Concepcion is not in the office and that is her PCP

## 2022-07-18 NOTE — TELEPHONE ENCOUNTER
Fyi- he did not go to the Er  He stopped the Singulair, SOB and CP resolved completely   Will continue with the Flonase and Allegra when needed for the his chronic Sinus pressure and HA's   If persist or worsen , will call  Told Ahsan's wife it may be suggested a referral to ENT then since adding Singulair did not help

## 2022-08-29 ENCOUNTER — TELEPHONE (OUTPATIENT)
Dept: DERMATOLOGY | Facility: CLINIC | Age: 47
End: 2022-08-29

## 2022-08-29 DIAGNOSIS — L91.8 ACROCHORDON: Primary | ICD-10-CM

## 2022-09-02 ENCOUNTER — CONSULT (OUTPATIENT)
Dept: DERMATOLOGY | Facility: CLINIC | Age: 47
End: 2022-09-02
Payer: COMMERCIAL

## 2022-09-02 ENCOUNTER — COSMETIC (OUTPATIENT)
Dept: DERMATOLOGY | Facility: CLINIC | Age: 47
End: 2022-09-02

## 2022-09-02 VITALS — HEIGHT: 70 IN | WEIGHT: 219 LBS | BODY MASS INDEX: 31.35 KG/M2 | TEMPERATURE: 97.6 F

## 2022-09-02 DIAGNOSIS — L91.8 ACROCHORDON: ICD-10-CM

## 2022-09-02 DIAGNOSIS — L91.8 ACHROCHORDON: Primary | ICD-10-CM

## 2022-09-02 DIAGNOSIS — D23.9 DERMAL NEVUS: Primary | ICD-10-CM

## 2022-09-02 PROCEDURE — SKNTGDERM SKIN TAG DERM ADD ON: Performed by: DERMATOLOGY

## 2022-09-02 PROCEDURE — 99243 OFF/OP CNSLTJ NEW/EST LOW 30: CPT | Performed by: DERMATOLOGY

## 2022-09-02 NOTE — PATIENT INSTRUCTIONS
COSMETIC REMOVAL FOR ACROCHORDON ("SKIN TAG")    Assessment and Plan:  Based on a thorough discussion of this condition and the management approach to it (including a comprehensive discussion of the known risks, side effects and potential benefits of treatment), the patient (family) agrees to implement the following specific plan:  Scissor snip removal done in office   Cosmetic $20 each lesion   Wound care discussed  Keep area clean with soap and water   Apply Vaseline daily for the next 7 days

## 2022-09-02 NOTE — PROGRESS NOTES
COSMETIC REMOVAL FOR ACROCHORDON ("SKIN TAG")    Physical Exam:   Anatomic Location Affected:  Left axilla, right axilla left groin    Morphological Description:  Pedunculated skin colored papules   Pertinent Positives:   Pertinent Negatives: Additional History of Present Condition:  Some have bled    Assessment and Plan:  Based on a thorough discussion of this condition and the management approach to it (including a comprehensive discussion of the known risks, side effects and potential benefits of treatment), the patient (family) agrees to implement the following specific plan:   Scissor snip removal done in office    Cosmetic $20 each lesion    Wound care discussed  Keep area clean with soap and water  Apply Vaseline daily for the next 7 days     Skin tags are common, soft, harmless skin lesions that are also called, in the appropriate settings, papillomas, fibroepithelial polyps, and soft fibromas  They are made up of loosely arranged collagen fibers and blood vessels surrounded by a thickened or thinned-out epidermis  Skin tags tend to develop in both men and women as we grow older  They are usually found on the skin folds (neck, armpits, groin)  It is not known what specifically causes skin tags  Certain factors, though, do appear to play a role:   Chaffing and irritation from skin rubbing together   High levels of growth factors (as seen, for example, in pregnancy or in acromegaly/gigantism)   Insulin resistance   Human papillomavirus (wart virus)    We discussed that most skin tags do not need to be treated unless they are specifically causing the patient physical distress or limitation or pose a risk for a larger problem such as an infection that forms secondary to excoriation or chronic irritation      We had a thorough discussion of treatment options and specific risks (including that any procedural treatment may not be covered by insurance and would then be the patient's responsibility) and benefits/alternatives including but not limited to the following:   Cryotherapy (freezing)   Shave removal   Surgical excision (snip excision with scissors)   Electrosurgery   Ligation (we do not do this procedure and counseled against it due to risk of tissue necrosis and infection)    *DO NOT BILL INSURANCE*    Scribe Attestation    I,:  Roque Peck am acting as a scribe while in the presence of the attending physician :       I,:  Edgard Salvador MD personally performed the services described in this documentation    as scribed in my presence :

## 2022-09-02 NOTE — PATIENT INSTRUCTIONS
ACROCHORDON ("SKIN TAG")    Assessment and Plan:  Based on a thorough discussion of this condition and the management approach to it (including a comprehensive discussion of the known risks, side effects and potential benefits of treatment), the patient (family) agrees to implement the following specific plan:  Reassured benign   Patient understood a prior authorization was required for any inflammed lesions to be covered(deferred at this time and okay with the out of pocket cost)   Removal discussed $20 each or $150 for up to 10 lesions (cosmetic)       DERMAL NEVI     Assessment and Plan:  Based on a thorough discussion of this condition and the management approach to it (including a comprehensive discussion of the known risks, side effects and potential benefits of treatment), the patient (family) agrees to implement the following specific plan:  Reassured benign  Advised to return for further evaluation if mole begins to change or hurts

## 2022-09-02 NOTE — PROGRESS NOTES
[facility] Note     Patient Name: Jenn Scott  Encounter Date: 09/02/2022     Have you been cared for by a [facility] Dermatologist in the last 3 years and, if so, which one? No    · Have you traveled outside of the [address] [place] [address] in the past 3 months or outside of the [facility] area in the last 2 weeks? No     May we call your Preferred Phone number to discuss your specific medical information? Yes     May we leave a detailed message that includes your specific medical information? Yes      Today's Chief Concerns:   Concern #1:  Growths of concern   Past Medical History:  Have you personally ever had or currently have any of the following? · Skin cancer (such as Melanoma, Basal Cell Carcinoma, Squamous Cell Carcinoma? (If Yes, please provide more detail)- No  · Eczema: No  · Psoriasis: No  · HIV/AIDS: No  · Hepatitis B or C: No  · Tuberculosis: No  · Systemic Immunosuppression such as Diabetes, Biologic or Immunotherapy, Chemotherapy, Organ Transplantation, Bone Marrow Transplantation (If YES, please provide more detail): No  · Radiation Treatment (If YES, please provide more detail): No  · Any other major medical conditions/concerns? 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 N/a     Family History:  Have any of your "first degree relatives" (parent, brother, sister, or child) had any of the following       · Skin cancer such as Melanoma or Merkel Cell Carcinoma or Pancreatic Cancer? No  · Eczema, Asthma, Hay Fever or Seasonal Allergies: No  · Psoriasis or Psoriatic Arthritis: No  · Do any other medical conditions seem to run in your family? If Yes, what condition and which relatives?   No    Current Medications:   (please update all dermatological medications before printing patient's AVS!)      Current Outpatient Medications:     ALPRAZolam (XANAX) 0 25 mg tablet, Take 1 tablet (0 25 mg total) by mouth 3 (three) times a day as needed for anxiety, Disp: 30 tablet, Rfl: 0    fluticasone (FLONASE) 50 mcg/act nasal spray, 1 spray into each nostril daily, Disp: , Rfl:     Ibuprofen 200 MG CAPS, Take 1 tablet by mouth 2 (two) times a day , Disp: , Rfl:     losartan (COZAAR) 50 mg tablet, Take 1 tablet (50 mg total) by mouth daily, Disp: 90 tablet, Rfl: 3    pantoprazole (PROTONIX) 40 mg tablet, Take 1 tablet (40 mg total) by mouth daily, Disp: 90 tablet, Rfl: 3    albuterol (ProAir HFA) 90 mcg/act inhaler, Inhale 2 puffs every 6 (six) hours as needed for wheezing (Patient not taking: No sig reported), Disp: 8 5 g, Rfl: 0    fexofenadine-pseudoephedrine (ALLEGRA-D)  MG per tablet, Take 1 tablet by mouth daily (Patient not taking: No sig reported), Disp: , Rfl:     montelukast (SINGULAIR) 10 mg tablet, Take 1 tablet (10 mg total) by mouth daily at bedtime, Disp: 30 tablet, Rfl: 5      Review of Systems:  Have you recently had or currently have any of the following? If YES, what are you doing for the problem? · Fever, chills or unintended weight loss: No  · Sudden loss or change in your vision: No  · Nausea, vomiting or blood in your stool: No  · Painful or swollen joints: No  · Wheezing or cough: No  · Changing mole or non-healing wound: No  · Nosebleeds: No  · Excessive sweating: No  · Easy or prolonged bleeding? No  · Over the last 2 weeks, how often have you been bothered by the following problems?   · Taking little interest or pleasure in doing things: 1 - Not at All  · Feeling down, depressed, or hopeless: 1 - Not at All  Rapid heartbeat with epinephrine:  No      Allergies   Allergen Reactions    Levaquin [Levofloxacin] GI Intolerance and Nausea Only    Rosuvastatin Myalgia         Physical Exam:     Was a chaperone (Derm Clinical Assistant) present throughout the entire Physical Exam? Yes        CONSTITUTIONAL:   Vitals:    09/02/22 1336   Temp: 97 6 °F (36 4 °C)   TempSrc: Temporal   Weight: 99 3 kg (219 lb)         PSYCH: Normal mood and affect  EYES: Normal conjunctiva  ENT: Normal lips and oral mucosa  CARDIOVASCULAR: No edema  RESPIRATORY: Normal respirations  HEME/LYMPH/IMMUNO:  No regional lymphadenopathy except as noted below in "ASSESSMENT AND PLAN BY DIAGNOSIS"    SKIN:  FULL ORGAN SYSTEM EXAM  Hair, Scalp, Ears, Face Normal except as noted below in Assessment   Neck, Cervical Chain Nodes Normal except as noted below in Assessment   Right Arm/Hand/Fingers Normal except as noted below in Assessment   Left Arm/Hand/Fingers Normal except as noted below in Assessment   Chest/Breasts/Axillae Viewed areas Normal except as noted below in Assessment   Abdomen, Umbilicus Normal except as noted below in Assessment   Back/Spine Normal except as noted below in Assessment   Groin/Genitalia/Buttocks NOT EXAMINED   Right Leg, Foot, Toes Normal except as noted below in Assessment   Left Leg, Foot, Toes Normal except as noted below in Assessment        Assessment and Plan by Diagnosis:    History of Present Condition:     Duration:  How long has this been an issue for you?    o  couple years    Location Affected:  Where on the body is this affecting you?    o  axilla,m right abdomen and left inner thigh    Quality:  Is there any bleeding, pain, itch, burning/irritation, or redness associated with the skin lesion?    o  growth near groin bled once    Severity:  Describe any bleeding, pain, itch, burning/irritation, or redness on a scale of 1 to 10 (with 10 being the worst)  o     Timing:  Does this condition seem to be there pretty constantly or do you notice it more at specific times throughout the day?     o  constant    Context:  Have you ever noticed that this condition seems to be associated with specific activities you do?    o  irritated with clothing    Modifying Factors:    o Anything that seems to make the condition worse?    -  clothing   o What have you tried to do to make the condition better?    -  denies   Associated Signs and Symptoms:  Does this skin lesion seem to be associated with any of the following:  o  SL AMB DERM SIGNS AND SYMPTOMS: Redness and Itching and Scratching     DERMAL NEVI   Physical Exam:   Anatomic Location Affected:  Left cheek   Morphological Description:  Light brown papule   Pertinent Positives:   Pertinent Negatives: Additional History of Present Condition:  Present all his life    Assessment and Plan:  Based on a thorough discussion of this condition and the management approach to it (including a comprehensive discussion of the known risks, side effects and potential benefits of treatment), the patient (family) agrees to implement the following specific plan:   Reassured benign  Advised to return for further evaluation if mole begins to change or hurts  ACROCHORDON ("SKIN TAG")    Physical Exam:   Anatomic Location Affected:  Right and left axilla, left groin    Morphological Description:  Pedunculated skin colored papules   Pertinent Positives:   Pertinent Negatives: Additional History of Present Condition:  Painful, some have bled    Assessment and Plan:  Based on a thorough discussion of this condition and the management approach to it (including a comprehensive discussion of the known risks, side effects and potential benefits of treatment), the patient (family) agrees to implement the following specific plan:   Reassured benign    Patient understood a prior authorization was required for any inflammed lesions to be covered(deferred at this time)   Removal discussed $20 each or $150 for up to 10 lesions (cosmetic)     Skin tags are common, soft, harmless skin lesions that are also called, in the appropriate settings, papillomas, fibroepithelial polyps, and soft fibromas  They are made up of loosely arranged collagen fibers and blood vessels surrounded by a thickened or thinned-out epidermis      Skin tags tend to develop in both men and women as we grow older  They are usually found on the skin folds (neck, armpits, groin)  It is not known what specifically causes skin tags  Certain factors, though, do appear to play a role:   Chaffing and irritation from skin rubbing together   High levels of growth factors (as seen, for example, in pregnancy or in acromegaly/gigantism)   Insulin resistance   Human papillomavirus (wart virus)    We discussed that most skin tags do not need to be treated unless they are specifically causing the patient physical distress or limitation or pose a risk for a larger problem such as an infection that forms secondary to excoriation or chronic irritation      We had a thorough discussion of treatment options and specific risks (including that any procedural treatment may not be covered by insurance and would then be the patient's responsibility) and benefits/alternatives including but not limited to the following:   Cryotherapy (freezing)   Shave removal   Surgical excision (snip excision with scissors)   Electrosurgery   Ligation (we do not do this procedure and counseled against it due to risk of tissue necrosis and infection)    Scribe Attestation    I,:  Leidy Gallagher am acting as a scribe while in the presence of the attending physician :       I,:  Shamar Padilla MD personally performed the services described in this documentation    as scribed in my presence :

## 2022-09-26 ENCOUNTER — OFFICE VISIT (OUTPATIENT)
Dept: FAMILY MEDICINE CLINIC | Facility: MEDICAL CENTER | Age: 47
End: 2022-09-26
Payer: COMMERCIAL

## 2022-09-26 VITALS
RESPIRATION RATE: 16 BRPM | WEIGHT: 222.4 LBS | DIASTOLIC BLOOD PRESSURE: 78 MMHG | TEMPERATURE: 98.6 F | HEART RATE: 68 BPM | HEIGHT: 70 IN | SYSTOLIC BLOOD PRESSURE: 128 MMHG | BODY MASS INDEX: 31.84 KG/M2

## 2022-09-26 DIAGNOSIS — M25.561 ACUTE PAIN OF BOTH KNEES: Primary | ICD-10-CM

## 2022-09-26 DIAGNOSIS — M25.562 ACUTE PAIN OF BOTH KNEES: Primary | ICD-10-CM

## 2022-09-26 PROCEDURE — 99213 OFFICE O/P EST LOW 20 MIN: CPT | Performed by: STUDENT IN AN ORGANIZED HEALTH CARE EDUCATION/TRAINING PROGRAM

## 2022-09-26 RX ORDER — MELOXICAM 15 MG/1
15 TABLET ORAL DAILY
Qty: 30 TABLET | Refills: 0 | Status: SHIPPED | OUTPATIENT
Start: 2022-09-26 | End: 2022-10-10

## 2022-09-26 NOTE — PROGRESS NOTES
[address] [facility] - Clinic Note  Reilly Vigil, 23     Yoni Birch MRN: [id number] : 1975 Age: 56 y o  Assessment/Plan     1  Acute pain of both knees    - follow-up imaging per orders and further advise   - NSAID p r n   - knee pain bilateral, follow-up lab work per orders as well  - Lyme Antibody Profile with reflex to WB; Future  - Antinuclear Antibodies (CARLOS), IFA; Future  - C-reactive protein; Future  - RF Screen w/ Reflex to Titer; Future  - CBC and differential; Future  - XR knee bilateral ap standing; Future  - meloxicam (Mobic) 15 mg tablet; Take 1 tablet (15 mg total) by mouth daily for 14 days  Dispense: 30 tablet; Refill: 0    Gafarooq Birch acknowledged understanding of treatment plan, all questions answered  Subjective     Yoni Birch is a 56 y o  male who presents with knee pain involving both knees  Onset was 1 week ago  Inciting event: none known  Patient is generally active  Current symptoms include: crepitus sensation, giving out prior not currently, pain located lateral patellar regions mostly and stiffness  Pain is aggravated by rising after sitting  Patient has had no prior knee problems  Evaluation to date: none  Treatment to date: ice and NSAIDs, minimal relief  About 1 month ago, pulled tick out       The following portions of the patient's history were reviewed and updated as appropriate: allergies, current medications, past family history, past medical history, past social history, past surgical history and problem list      Past Medical History:   Diagnosis Date    Back pain     GERD (gastroesophageal reflux disease)        Allergies   Allergen Reactions    Levaquin [Levofloxacin] GI Intolerance and Nausea Only    Rosuvastatin Myalgia       Past Surgical History:   Procedure Laterality Date    APPENDECTOMY      CHOLECYSTECTOMY      HERNIA REPAIR      inguinal/umbilical - last assessed 16    [place] EXC SKIN BENIG 1 1-2 CM TRUNK,ARM,LEG Right 1/20/2016    Procedure: REVISION SCAR ABDOMINAL Exploration of painful scar on right upper abdomen;  Surgeon: Randall Pinto MD;  Location: [address] [place] [address];  Service: General    [address] [place] [address] N/A 1/20/2016    Procedure: REPAIR HERNIA VENTRAL (SMALL EPIGASTRIC HERNIA);   Surgeon: Randall Pinto MD;  Location: [facility];  Service: General       Family History   Problem Relation Age of Onset    Cancer Mother         lung    Hypertension Mother     Arthritis Mother     Hypertension Father     Hiatal hernia Father     Arthritis Father     Anxiety disorder Father     Cancer Maternal Uncle        Social History     Socioeconomic History    Marital status: /Civil Union     Spouse name: None    Number of children: None    Years of education: None    Highest education level: None   Occupational History    None   Tobacco Use    Smoking status: Never Smoker    Smokeless tobacco: Never Used   Vaping Use    Vaping Use: Never used   Substance and Sexual Activity    Alcohol use: No    Drug use: No    Sexual activity: None   Other Topics Concern    None   Social History Narrative    None     Social Determinants of Health     Financial Resource Strain: Not on file   Food Insecurity: Not on file   Transportation Needs: Not on file   Physical Activity: Not on file   Stress: Not on file   Social Connections: Not on file   Intimate Partner Violence: Not on file   Housing Stability: Not on file       Current Outpatient Medications   Medication Sig Dispense Refill    albuterol (ProAir HFA) 90 mcg/act inhaler Inhale 2 puffs every 6 (six) hours as needed for wheezing (Patient not taking: No sig reported) 8 5 g 0    ALPRAZolam (XANAX) 0 25 mg tablet Take 1 tablet (0 25 mg total) by mouth 3 (three) times a day as needed for anxiety 30 tablet 0    fexofenadine-pseudoephedrine (ALLEGRA-D)  MG per tablet Take 1 tablet by mouth daily (Patient not taking: No sig reported)      fluticasone (FLONASE) 50 mcg/act nasal spray 1 spray into each nostril daily      Ibuprofen 200 MG CAPS Take 1 tablet by mouth 2 (two) times a day   losartan (COZAAR) 50 mg tablet Take 1 tablet (50 mg total) by mouth daily 90 tablet 3    pantoprazole (PROTONIX) 40 mg tablet Take 1 tablet (40 mg total) by mouth daily 90 tablet 3     No current facility-administered medications for this visit  Review of Systems     As noted in HPI    Objective      /78 (BP Location: Left arm, Patient Position: Sitting, Cuff Size: Adult)   Pulse 68   Temp 98 6 °F (37 °C)   Resp 16   Ht 5' 10" (1 778 m)   Wt 101 kg (222 lb 6 4 oz)   BMI 31 91 kg/m²     Physical Exam  Vitals reviewed  Constitutional:       General: He is not in acute distress  Appearance: Normal appearance  He is not ill-appearing or toxic-appearing  HENT:      Head: Normocephalic and atraumatic  Eyes:      Conjunctiva/sclera: Conjunctivae normal    Pulmonary:      Effort: Pulmonary effort is normal    Musculoskeletal:      Right knee: Crepitus present  No swelling, deformity, effusion, erythema or bony tenderness  Normal range of motion  Tenderness present  No LCL laxity, MCL laxity, ACL laxity or PCL laxity  Normal alignment and normal patellar mobility  Normal pulse  Instability Tests: Anterior drawer test negative  Posterior drawer test negative  Anterior Lachman test negative  Medial Emmanuel test negative and lateral Emmanuel test negative  Left knee: No swelling, deformity, effusion, erythema, bony tenderness or crepitus  Normal range of motion  Tenderness present  No LCL laxity, MCL laxity, ACL laxity or PCL laxity  Normal alignment and normal patellar mobility  Normal pulse  Instability Tests: Anterior drawer test negative  Posterior drawer test negative  Anterior Lachman test negative  Medial Emmanuel test negative and lateral Emmanuel test negative  Right lower leg: No edema  Left lower leg: No edema  Comments: 5/5 muscle strength bilateral lower extremities, sensation to light touch intact bilateral lower extremities   Skin:     General: Skin is warm and dry  Neurological:      Mental Status: He is alert and oriented to person, place, and time  Psychiatric:         Mood and Affect: Mood normal          Behavior: Behavior normal          Thought Content: Thought content normal          Judgment: Judgment normal              Some portions of this record may have been generated with voice recognition software  There may be translation, syntax, or grammatical errors  Occasional wrong word or "sound-a-like" substitutions may have occurred due to the inherent limitations of the voice recognition software  Read the chart carefully and recognize, using context, where substations may have occurred  If you have any questions, please contact the dictating provider for clarification or correction, as needed

## 2022-09-27 ENCOUNTER — APPOINTMENT (OUTPATIENT)
Dept: RADIOLOGY | Facility: MEDICAL CENTER | Age: 47
End: 2022-09-27
Payer: COMMERCIAL

## 2022-09-27 ENCOUNTER — APPOINTMENT (OUTPATIENT)
Dept: LAB | Facility: MEDICAL CENTER | Age: 47
End: 2022-09-27
Payer: COMMERCIAL

## 2022-09-27 DIAGNOSIS — M25.562 ACUTE PAIN OF BOTH KNEES: ICD-10-CM

## 2022-09-27 DIAGNOSIS — M25.561 ACUTE PAIN OF BOTH KNEES: ICD-10-CM

## 2022-09-27 LAB
B BURGDOR IGG+IGM SER-ACNC: 0.4 AI
BASOPHILS # BLD AUTO: 0.11 THOUSANDS/ΜL (ref 0–0.1)
BASOPHILS NFR BLD AUTO: 1 % (ref 0–1)
CRP SERPL QL: <3 MG/L
EOSINOPHIL # BLD AUTO: 0.18 THOUSAND/ΜL (ref 0–0.61)
EOSINOPHIL NFR BLD AUTO: 2 % (ref 0–6)
ERYTHROCYTE [DISTWIDTH] IN BLOOD BY AUTOMATED COUNT: 13.2 % (ref 11.6–15.1)
HCT VFR BLD AUTO: 49.3 % (ref 36.5–49.3)
HGB BLD-MCNC: 15.6 G/DL (ref 12–17)
IMM GRANULOCYTES # BLD AUTO: 0.02 THOUSAND/UL (ref 0–0.2)
IMM GRANULOCYTES NFR BLD AUTO: 0 % (ref 0–2)
LYMPHOCYTES # BLD AUTO: 3.8 THOUSANDS/ΜL (ref 0.6–4.47)
LYMPHOCYTES NFR BLD AUTO: 40 % (ref 14–44)
MCH RBC QN AUTO: 27.2 PG (ref 26.8–34.3)
MCHC RBC AUTO-ENTMCNC: 31.6 G/DL (ref 31.4–37.4)
MCV RBC AUTO: 86 FL (ref 82–98)
MONOCYTES # BLD AUTO: 0.7 THOUSAND/ΜL (ref 0.17–1.22)
MONOCYTES NFR BLD AUTO: 7 % (ref 4–12)
NEUTROPHILS # BLD AUTO: 4.67 THOUSANDS/ΜL (ref 1.85–7.62)
NEUTS SEG NFR BLD AUTO: 50 % (ref 43–75)
NRBC BLD AUTO-RTO: 0 /100 WBCS
PLATELET # BLD AUTO: 363 THOUSANDS/UL (ref 149–390)
PMV BLD AUTO: 10.2 FL (ref 8.9–12.7)
RBC # BLD AUTO: 5.73 MILLION/UL (ref 3.88–5.62)
RHEUMATOID FACT SER QL LA: NEGATIVE
WBC # BLD AUTO: 9.48 THOUSAND/UL (ref 4.31–10.16)

## 2022-09-27 PROCEDURE — 36415 COLL VENOUS BLD VENIPUNCTURE: CPT

## 2022-09-27 PROCEDURE — 73562 X-RAY EXAM OF KNEE 3: CPT

## 2022-09-27 PROCEDURE — 86038 ANTINUCLEAR ANTIBODIES: CPT

## 2022-09-27 PROCEDURE — 86140 C-REACTIVE PROTEIN: CPT

## 2022-09-27 PROCEDURE — 86618 LYME DISEASE ANTIBODY: CPT

## 2022-09-27 PROCEDURE — 85025 COMPLETE CBC W/AUTO DIFF WBC: CPT

## 2022-09-27 PROCEDURE — 86430 RHEUMATOID FACTOR TEST QUAL: CPT

## 2022-09-28 LAB — ANA TITR SER IF: NEGATIVE {TITER}

## 2022-10-03 ENCOUNTER — TELEPHONE (OUTPATIENT)
Dept: FAMILY MEDICINE CLINIC | Facility: MEDICAL CENTER | Age: 47
End: 2022-10-03

## 2022-10-03 DIAGNOSIS — M25.561 ACUTE PAIN OF BOTH KNEES: Primary | ICD-10-CM

## 2022-10-03 DIAGNOSIS — M25.562 ACUTE PAIN OF BOTH KNEES: Primary | ICD-10-CM

## 2022-10-03 NOTE — TELEPHONE ENCOUNTER
Please inform patient that x-rays did not reveal any significant arthritis or acute findings  Patient may benefit from course of physical therapy no improvement after physical therapy can consider referral to Sports Medicine

## 2022-10-12 PROBLEM — J06.9 UPPER RESPIRATORY INFECTION WITH COUGH AND CONGESTION: Status: RESOLVED | Noted: 2021-12-09 | Resolved: 2022-10-12

## 2022-10-12 PROBLEM — R05.9 COUGH: Status: RESOLVED | Noted: 2021-12-09 | Resolved: 2022-10-12

## 2022-10-19 ENCOUNTER — EVALUATION (OUTPATIENT)
Dept: PHYSICAL THERAPY | Facility: MEDICAL CENTER | Age: 47
End: 2022-10-19
Payer: COMMERCIAL

## 2022-10-19 DIAGNOSIS — M25.561 ACUTE PAIN OF BOTH KNEES: Primary | ICD-10-CM

## 2022-10-19 DIAGNOSIS — M25.562 ACUTE PAIN OF BOTH KNEES: Primary | ICD-10-CM

## 2022-10-19 PROCEDURE — 97110 THERAPEUTIC EXERCISES: CPT

## 2022-10-19 PROCEDURE — 97161 PT EVAL LOW COMPLEX 20 MIN: CPT

## 2022-10-19 NOTE — PROGRESS NOTES
PT Evaluation     Today's date: 10/19/2022  Patient name: Rafi Venegas  : 1975  MRN: [id number]  Referring provider: Tony Lackey DO  Dx:   Encounter Diagnosis     ICD-10-CM    1  Acute pain of both knees  M25 561 Ambulatory Referral to Physical Therapy    M25 562        Start Time:   Stop Time: 8906  Total time in clinic (min): 40 minutes    Assessment  Assessment details: Patient is a 56 y o male reporting to therapy with the referring diagnosis of acute pain of both knees  Upon exam, patient presents with the following deficits: decreased quad and hamstring muscle flexibility, tenderness to palpate at bilateral quad tendons, decreased squat and bending mechanics, and reports of decrease standing and walking endurance  Patient's deficits interfere with his ability to perform ADLs including walking and stair activities  He is a good candidate for therapy in order to address his stated deficits and improve his function so he can return to all activities with less pain  Positive prognostic factors include good reception of information and good response to exercises today  Negative prognostic factors include: none  Patient was educated on his home exercise program including correct technique and form of the activities           Impairments: abnormal or restricted ROM, abnormal movement, activity intolerance, impaired physical strength, lacks appropriate home exercise program, pain with function and poor body mechanics    Symptom irritability: moderateUnderstanding of Dx/Px/POC: fair   Prognosis: fair    Goals  STG  -Patient will be IND with basic level HEP within 4 weeks in order to make improvements at home   - Patient will have a decrease in 3 points on the NPRS within 4 weeks in order to improve quality of life  -Proper squatting and bending mechanics within 4 weeks   - 50% reduction in tenderness to palpate within 4 weeks    LTG  -Patient will be IND with an advanced level HEP within 8 weeks in order to make improvements at home   - Patient will have a decrease in 5 points on the NPRS within 8 weeks in order to improve quality of life  - Patient will reach his FOTO score goal within 8 weeks  - 75% reduction in tenderness to palpate of his quad tendon within 8 weeks  - Performance with all ADLs with pain <2/10 within 12 weeks     Plan  Plan details: Skilled PT to improve strength, ROM, flexibility, endurance, pain, and function  Patient would benefit from: PT eval and skilled physical therapy  Referral necessary: No  Planned modality interventions: biofeedback, cryotherapy, electrical stimulation/Russian stimulation and TENS  Planned therapy interventions: manual therapy, joint mobilization, massage, muscle pump exercises, neuromuscular re-education, patient education, postural training, strengthening, stretching, therapeutic activities, therapeutic exercise, flexibility, functional ROM exercises, graded activity, graded exercise, home exercise program, ADL retraining and body mechanics training  Frequency: 2x week  Duration in weeks: 12  Plan of Care beginning date: 10/19/2022  Plan of Care expiration date: 1/11/2023  Treatment plan discussed with: patient        Subjective Evaluation    History of Present Illness  Mechanism of injury: Patient reports having bilateral knee pain that started a few weeks ago  Patient is unable to recall any specific mechanisms of injury for his increased pain levels  He states that his pain "comes and goes" and is worst some days more than others  His pain is described as being "achy" and "uncomfortable" in nature  Symptoms are worst with weight bearing  He locates his pain both above and below his knee knee cap  He does state having one prior episode of knee pain following his covid shot approximately one year ago  This lasted three weeks before going away  Due to his knee pain he has trouble mainly with walking and stairs   His goals for therapy are to "get back to normal"  Pain is relieved with advil  He denies numbness and tingling symptoms  Not a recurrent problem   Quality of life: good    Pain  Current pain ratin  At best pain ratin  At worst pain ratin  Quality: dull ache and discomfort  Relieving factors: relaxation and rest  Aggravating factors: walking and stair climbing  Progression: no change      Diagnostic Tests  X-ray: normal  Treatments  No previous or current treatments  Patient Goals  Patient goals for therapy: increased strength, independence with ADLs/IADLs, decreased pain, increased motion and return to sport/leisure activities          Objective     Tenderness   Left Knee   Tenderness in the patellar tendon and quadriceps tendon  No tenderness in the lateral joint line and medial joint line  Right Knee   Tenderness in the patellar tendon and quadriceps tendon  No tenderness in the lateral joint line and medial joint line  Neurological Testing     Additional Neurological Details  Patient denies numbness, tingling, or radicular symptoms    Active Range of Motion   Left Knee   Flexion: 130 degrees WFL  Extension: 0 degrees     Right Knee   Flexion: 130 degrees WFL  Extension: 0 degrees     Passive Range of Motion   Left Knee   Normal passive range of motion    Right Knee   Normal passive range of motion    Strength/Myotome Testing     Left Knee   Flexion: 5  Extension: 5  Quadriceps contraction: good    Right Knee   Flexion: 5  Extension: 5  Quadriceps contraction: good    Tests     Left Knee   Negative lateral Emmanuel, medial Emmanuel, patellar compression and patella-femoral grind  Right Knee   Negative lateral Emmanuel, medial Emmanuel, patellar compression and patella-femoral grind       General Comments:      Knee Comments  Squat assessment  - heel raise off ground BL  - anterior tibial translation      Quad muscle length (+) BL    90-90 hamstring muscle length (+) BL             Precautions:   Past Medical History: Diagnosis Date   • Back pain    • GERD (gastroesophageal reflux disease)            Manuals 10/19            IASTM quad tendon                                                     Neuro Re-Ed                                                                                                        Ther Ex             bike             Quad stretch 3x30" BL            Supine hamstring stretch 3x30" BL            LAQ GTB 2x10 with 3" eccentric lowering            TKE             Leg press             TRX squat                           Ther Activity                                       Gait Training                                       Modalities

## 2022-10-26 ENCOUNTER — APPOINTMENT (OUTPATIENT)
Dept: PHYSICAL THERAPY | Facility: MEDICAL CENTER | Age: 47
End: 2022-10-26

## 2022-10-28 ENCOUNTER — APPOINTMENT (OUTPATIENT)
Dept: PHYSICAL THERAPY | Facility: MEDICAL CENTER | Age: 47
End: 2022-10-28

## 2022-11-02 ENCOUNTER — OFFICE VISIT (OUTPATIENT)
Dept: PHYSICAL THERAPY | Facility: MEDICAL CENTER | Age: 47
End: 2022-11-02

## 2022-11-02 DIAGNOSIS — M25.562 ACUTE PAIN OF BOTH KNEES: Primary | ICD-10-CM

## 2022-11-02 DIAGNOSIS — M25.561 ACUTE PAIN OF BOTH KNEES: Primary | ICD-10-CM

## 2022-11-02 NOTE — PROGRESS NOTES
Daily Note     Today's date: 2022  Patient name: Bill Haskins  : 1975  MRN: [id number]  Referring provider: Kelli Joyner DO  Dx:   Encounter Diagnosis     ICD-10-CM    1  Acute pain of both knees  M25 561     M25 562        Start Time: 0800  Stop Time: 0845  Total time in clinic (min): 45 minutes    Subjective: Patient reports knee pain ranked 4/10 at the start of the session  Pain is worst in the right more than the left  He also reports that fluctuations in knee pain  Objective: See treatment diary below      Assessment: Pain is present in area over bilateral patellar tendon  He tolerated IASTM technique well along with all following exercises  Patient was educated on the importance of completion of daily HEP  Patient would benefit from continued PT in order to improve strength, endurance, pain, and function  Patient did have quad muscle fatigue present post session but no reported increases in pain  Plan: Continue per plan of care        Precautions:   Past Medical History:   Diagnosis Date   • Back pain    • GERD (gastroesophageal reflux disease)            Manuals 10/19 11/2           IASTM patellar tendon   TE 12' BL                                                  Neuro Re-Ed             SLR  GTB 2x10           SL SLR  GTB 2x10           Side stepping   GTB 10 cycles                                                               Ther Ex             bike  5'           Quad stretch 3x30" BL 10x10" BL           Supine hamstring stretch 3x30" BL 10x10" BL           LAQ GTB 2x10 with 3" eccentric lowering GTB 2x10 with 3" eccentric lowering           TKE  GTB 2x10           Leg press  2x10 75#           Retro cable dee walk out  10x 50#           TRX squat                           Ther Activity                                       Gait Training                                       Modalities

## 2022-11-04 ENCOUNTER — OFFICE VISIT (OUTPATIENT)
Dept: PHYSICAL THERAPY | Facility: MEDICAL CENTER | Age: 47
End: 2022-11-04

## 2022-11-04 DIAGNOSIS — M25.562 ACUTE PAIN OF BOTH KNEES: Primary | ICD-10-CM

## 2022-11-04 DIAGNOSIS — M25.561 ACUTE PAIN OF BOTH KNEES: Primary | ICD-10-CM

## 2022-11-04 NOTE — PROGRESS NOTES
Daily Note     Today's date: 2022  Patient name: Jj Stern  : 1975  MRN: [id number]  Referring provider: Marek Davis, DO  Dx:   Encounter Diagnosis     ICD-10-CM    1  Acute pain of both knees  M25 561     M25 562        Start Time: 08  Stop Time: 930  Total time in clinic (min): 45 minutes    Subjective: Patient reports achiness in his knees that he ranks 6/10  He locates his discomfort over his anterior knee  Soreness was present following last therapy session  Objective: See treatment diary below      Assessment: Reduced intesnity of strengthenig/endurance exercises today due to reports of soreness  Session also focused on more flexibility and range of motion with longer hold times of stretches and addition of heel slide exercise  Patient tolerates interventions well  Patient would benefit from continued PT in order to improve strength, endurance, pain, and function  Exercises will be progressed as tolerated moving forward  Plan: Continue per plan of care        Precautions:   Past Medical History:   Diagnosis Date   • Back pain    • GERD (gastroesophageal reflux disease)            Manuals 10/19 11/2 11/4          IASTM patellar tendon   TE 12' BL BL TE 15'                                                  Neuro Re-Ed             SLR  GTB 2x10 2x10 AROM          SL SLR  GTB 2x10 2x10 AROM          Side stepping   GTB 10 cycles np today          saq   2x10 2"                                                 Ther Ex             bike  5' 5'          Quad stretch 3x30" BL 10x10" BL 3x30" BL          Supine hamstring stretch 3x30" BL 10x10" BL 3x30" BL          Heel slide   30x 2" BL           LAQ GTB 2x10 with 3" eccentric lowering GTB 2x10 with 3" eccentric lowering YTB 2x10 2" lowering          TKE  GTB 2x10           Leg press  2x10 75#           Retro cable dee walk out  10x 50#           TRX squat                           Ther Activity                                       Gait Training                                       Modalities

## 2022-11-08 ENCOUNTER — OFFICE VISIT (OUTPATIENT)
Dept: PHYSICAL THERAPY | Facility: MEDICAL CENTER | Age: 47
End: 2022-11-08

## 2022-11-08 DIAGNOSIS — M25.561 ACUTE PAIN OF BOTH KNEES: Primary | ICD-10-CM

## 2022-11-08 DIAGNOSIS — M25.562 ACUTE PAIN OF BOTH KNEES: Primary | ICD-10-CM

## 2022-11-08 NOTE — PROGRESS NOTES
Daily Note     Today's date: 2022  Patient name: Dia Carter  : 1975  MRN: [id number]  Referring provider: Faith Raymond DO  Dx:   Encounter Diagnosis     ICD-10-CM    1  Acute pain of both knees  M25 561     M25 562        Start Time: 1215  Stop Time: 1300  Total time in clinic (min): 45 minutes    Subjective: Patient reports the pain has been worst in his right knee compared to his left lately  His right knee starting hurting as soon as he got up and tried to get out of bed this morning  He also notes difficulty with steps  Overall, his pain has been relatively constant  He ranks right knee pain 7-8/10  He ranks his left knee pain 3-4/10  No muscle soreness reported following last session  Objective: See treatment diary below      Assessment: Patient tolerates interventions well today  No reports of any increases in pain with exercises  Resistance in the form of cuff weights and thera band was able to be added back today for open chained exercises today at a lighter intensity  Patient would benefit from continued PT in order to improve strength, endurance, pain, and function  Plan: Continue per plan of care        Precautions:   Past Medical History:   Diagnosis Date   • Back pain    • GERD (gastroesophageal reflux disease)          PT 1:1 time [id number]  Manuals 10/19 11/2 11/4 11/8         IASTM patellar tendon   TE 12' BL BL TE 15'  BL TE 10'                                                 Neuro Re-Ed             SLR  GTB 2x10 2x10 AROM 2x10 YTB         SL SLR  GTB 2x10 2x10 AROM 2x10 YTB         Side stepping   GTB 10 cycles np today YTB 10 cycles         saq   2x10 2" 2x10 2" 2#                                                Ther Ex             bike  5' 5' 5'         Quad stretch 3x30" BL 10x10" BL 3x30" BL 3x30" BL         Supine hamstring stretch 3x30" BL 10x10" BL 3x30" BL 3x30" BL         Heel slide   30x 2" BL  30x 2" BL          LAQ GTB 2x10 with 3" eccentric lowering GTB 2x10 with 3" eccentric lowering YTB 2x10 2" lowering 2# 2x10 2" eccentric lowering          TKE  GTB 2x10           Leg press  2x10 75#           Retro cable dee walk out  10x 50#           TRX squat                           Ther Activity                                       Gait Training                                       Modalities

## 2022-11-09 ENCOUNTER — APPOINTMENT (OUTPATIENT)
Dept: PHYSICAL THERAPY | Facility: MEDICAL CENTER | Age: 47
End: 2022-11-09

## 2022-11-15 DIAGNOSIS — I10 ESSENTIAL HYPERTENSION: ICD-10-CM

## 2022-11-15 DIAGNOSIS — K21.9 GASTROESOPHAGEAL REFLUX DISEASE: ICD-10-CM

## 2022-11-16 ENCOUNTER — OFFICE VISIT (OUTPATIENT)
Dept: PHYSICAL THERAPY | Facility: MEDICAL CENTER | Age: 47
End: 2022-11-16

## 2022-11-16 DIAGNOSIS — M25.561 ACUTE PAIN OF BOTH KNEES: Primary | ICD-10-CM

## 2022-11-16 DIAGNOSIS — M25.562 ACUTE PAIN OF BOTH KNEES: Primary | ICD-10-CM

## 2022-11-16 NOTE — PROGRESS NOTES
PT ReEvaluation/Daily Note    Today's date: 2022  Patient name: Laith Lizama  : 1975  MRN: [id number]  Referring provider: Renita Titus DO  Dx:   Encounter Diagnosis     ICD-10-CM    1  Acute pain of both knees  M25 561     M25 562           Start Time: 08  Stop Time: 0930  Total time in clinic (min): 45 minutes    Assessment  Assessment details: ReEvaluation   Patient reports for a reevaluation today having completed one month of therapy  Despite a decreased number of visits performed over the course of the last month due to patient traveling and missed appointments, he has still made progress  Per subjective reports and the NPRS, the patient displays lower pain levels for both his left and right knee however improvements have been more on his left side  Upon examination knee ROM remains WNL  Tenderness at his patellar and quad tendons on his left side has dissipated  Tenderness at these same locations is still present at his right side however at a lesser intensity  Patient's stated improvements have led to better performance with ALDs such as with walking stair activities  Increased function is also evident though increases in foto outcomes scores  The patient will continue to benefit from skilled PT in order to further improve function, pain, and strength  Future sessions will look to progress strengthening with more closed chain exercises  Patient shows good progress in short and long term therapy goals          Impairments: abnormal or restricted ROM, abnormal movement, activity intolerance, impaired physical strength, lacks appropriate home exercise program, pain with function and poor body mechanics    Symptom irritability: moderateUnderstanding of Dx/Px/POC: fair   Prognosis: fair    Goals  STG  -Patient will be IND with basic level HEP within 4 weeks in order to make improvements at home - met   - Patient will have a decrease in 3 points on the NPRS within 4 weeks in order to improve quality of life- met with left knee progressing with right  -Proper squatting and bending mechanics within 4 weeks - progressing  - 50% reduction in tenderness to palpate within 4 weeks- met     LTG  -Patient will be IND with an advanced level HEP within 8 weeks in order to make improvements at home - not met   - Patient will have a decrease in 5 points on the NPRS within 8 weeks in order to improve quality of life- progressing  - Patient will reach his FOTO score goal within 8 weeks- not met   - 75% reduction in tenderness to palpate of his quad tendon within 8 weeks- progressing  - Performance with all ADLs with pain <2/10 within 12 weeks - not met     Plan  Plan details: Skilled PT to improve strength, ROM, flexibility, endurance, pain, and function  Patient would benefit from: PT eval and skilled physical therapy  Referral necessary: No  Planned modality interventions: biofeedback, cryotherapy, electrical stimulation/Russian stimulation and TENS  Planned therapy interventions: manual therapy, joint mobilization, massage, muscle pump exercises, neuromuscular re-education, patient education, postural training, strengthening, stretching, therapeutic activities, therapeutic exercise, flexibility, functional ROM exercises, graded activity, graded exercise, home exercise program, ADL retraining and body mechanics training  Frequency: 2x week  Duration in weeks: 12  Plan of Care beginning date: 10/19/2022  Plan of Care expiration date: 1/11/2023  Treatment plan discussed with: patient        Subjective Evaluation    History of Present Illness  Mechanism of injury: ReEvaluation 11/16  Patient reports that he feels therapy has "definitely helped him" since first starting  He states that his anterior knee pain "doesn't hurt as bad as it did"  but the pain is still present   He reports improvements in his pain levels in both his left and right knee however left sided improvements have been more (see subjective pain levels below)  Due to improved pain he states that stairs have been easier for him to do  His exercises are all going well and he believes they are helping him  However he still has trouble pushing down on his brake of his tractor  Remaining symptoms are still described as an "ache" and "stiff"  Right knee pain   Current: 4/10  Best: 3/10  Worst: 7/10    Left knee pain   Current: 0/10  Best: 0/10  Worst: 3-4/10          Not a recurrent problem   Quality of life: good    Pain  Quality: dull ache and discomfort  Relieving factors: relaxation and rest  Aggravating factors: walking and stair climbing  Progression: no change      Diagnostic Tests  X-ray: normal  Treatments  No previous or current treatments  Patient Goals  Patient goals for therapy: increased strength, independence with ADLs/IADLs, decreased pain, increased motion and return to sport/leisure activities          Objective     Tenderness   Left Knee   No tenderness in the lateral joint line, medial joint line, patellar tendon and quadriceps tendon  Right Knee   Tenderness in the patellar tendon and quadriceps tendon  No tenderness in the lateral joint line and medial joint line       Additional Tenderness Details  No tenderness to palpate on left side  Tenderness still present on the right side however at a reported less intensity compared to initial eval    Neurological Testing     Additional Neurological Details  No change since initial eval     Active Range of Motion   Left Knee   Flexion: 130 degrees WFL  Extension: 0 degrees     Right Knee   Flexion: 130 degrees WFL  Extension: 0 degrees     Additional Active Range of Motion Details  Motion remains WNL    Passive Range of Motion   Left Knee   Normal passive range of motion    Right Knee   Normal passive range of motion    Strength/Myotome Testing     Left Knee   Flexion: 5  Extension: 5  Quadriceps contraction: good    Right Knee   Flexion: 5  Extension: 5  Quadriceps contraction: good    General Comments:      Knee Comments  90-90 hamstring muscle length (+) BL - still positive on retest             Precautions:   Past Medical History:   Diagnosis Date   • Back pain    • GERD (gastroesophageal reflux disease)          Manuals 10/19 11/2 11/4 11/8 11/16        IASTM patellar tendon   TE 12' BL BL TE 15'  BL TE 10'  R side only TE 10'                                               Neuro Re-Ed             SLR  GTB 2x10 2x10 AROM 2x10 YTB 2x10 2 5#        SL SLR  GTB 2x10 2x10 AROM 2x10 YTB 2x10 2 5#        Side stepping   GTB 10 cycles np today YTB 10 cycles GTB 10 cycles        saq   2x10 2" 2x10 2" 2# 2x10 2" 2 5#                                               Ther Ex             bike  5' 5' 5' 5'        Quad stretch 3x30" BL 10x10" BL 3x30" BL 3x30" BL 3x30" BL        Supine hamstring stretch 3x30" BL 10x10" BL 3x30" BL 3x30" BL 3x30" BL        Heel slide   30x 2" BL  30x 2" BL          LAQ GTB 2x10 with 3" eccentric lowering GTB 2x10 with 3" eccentric lowering YTB 2x10 2" lowering 2# 2x10 2" eccentric lowering  2 5# 2x10 2" eccentric lowering        TKE  GTB 2x10   GTB 2x10 2"        Leg press  2x10 75#           Retro cable dee walk out  10x 50#           TRX squat                           Ther Activity                                       Gait Training                                       Modalities

## 2022-11-18 ENCOUNTER — APPOINTMENT (OUTPATIENT)
Dept: PHYSICAL THERAPY | Facility: MEDICAL CENTER | Age: 47
End: 2022-11-18

## 2022-11-18 RX ORDER — LOSARTAN POTASSIUM 50 MG/1
TABLET ORAL
Qty: 90 TABLET | Refills: 3 | Status: SHIPPED | OUTPATIENT
Start: 2022-11-18

## 2022-11-18 RX ORDER — PANTOPRAZOLE SODIUM 40 MG/1
TABLET, DELAYED RELEASE ORAL
Qty: 90 TABLET | Refills: 3 | Status: SHIPPED | OUTPATIENT
Start: 2022-11-18

## 2022-11-21 ENCOUNTER — APPOINTMENT (OUTPATIENT)
Dept: PHYSICAL THERAPY | Facility: MEDICAL CENTER | Age: 47
End: 2022-11-21

## 2022-11-23 ENCOUNTER — APPOINTMENT (OUTPATIENT)
Dept: PHYSICAL THERAPY | Facility: MEDICAL CENTER | Age: 47
End: 2022-11-23

## 2022-11-30 ENCOUNTER — APPOINTMENT (OUTPATIENT)
Dept: PHYSICAL THERAPY | Facility: MEDICAL CENTER | Age: 47
End: 2022-11-30

## 2022-12-07 ENCOUNTER — TELEPHONE (OUTPATIENT)
Dept: FAMILY MEDICINE CLINIC | Facility: MEDICAL CENTER | Age: 47
End: 2022-12-07

## 2022-12-07 NOTE — TELEPHONE ENCOUNTER
Spoke with patient  Started with a cough on Sunday and Sinus congestion on Monday  Patient has been taking Dayquil and Nightquil since Monday  Recommended that he try adding Flonase to help with the sinus dripping  Requested that Aristides Shallow give the meds a few days to work since he has only been taking them 2 full days  Patient agreeable will call back on Friday if no improvement

## 2022-12-07 NOTE — TELEPHONE ENCOUNTER
Pt's wife called to say the pt has been having a cough and sinus dripping since Sunday  He is taking sinus medication  Pt generally gets a sinus infection this time of year  Pt has slightly clogged ears and some popping  No fever  Pt is vaccinated for covid  They do not have a home covid test to take and wife said his symptoms are not covid related  Wife says pt has switched to Dr Ketan Arreguin  Please, advise

## 2023-01-23 ENCOUNTER — TELEPHONE (OUTPATIENT)
Dept: FAMILY MEDICINE CLINIC | Facility: MEDICAL CENTER | Age: 48
End: 2023-01-23

## 2023-01-23 NOTE — TELEPHONE ENCOUNTER
Pt's wife called  Pt has had congestion, post nasal drip, clogged ears, cough for 3 days  Pt is vaccinated  Home test is negative  Please, triage

## 2023-01-24 NOTE — TELEPHONE ENCOUNTER
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

## 2023-02-19 ENCOUNTER — OFFICE VISIT (OUTPATIENT)
Dept: URGENT CARE | Facility: MEDICAL CENTER | Age: 48
End: 2023-02-19

## 2023-02-19 VITALS
WEIGHT: 220 LBS | DIASTOLIC BLOOD PRESSURE: 86 MMHG | OXYGEN SATURATION: 99 % | HEIGHT: 70 IN | HEART RATE: 65 BPM | TEMPERATURE: 97.6 F | RESPIRATION RATE: 18 BRPM | BODY MASS INDEX: 31.5 KG/M2 | SYSTOLIC BLOOD PRESSURE: 124 MMHG

## 2023-02-19 DIAGNOSIS — J06.9 ACUTE URI: Primary | ICD-10-CM

## 2023-02-19 LAB — S PYO AG THROAT QL: NEGATIVE

## 2023-02-19 RX ORDER — DEXTROMETHORPHAN HYDROBROMIDE AND PROMETHAZINE HYDROCHLORIDE 15; 6.25 MG/5ML; MG/5ML
5 SOLUTION ORAL 4 TIMES DAILY PRN
Qty: 120 ML | Refills: 0 | Status: SHIPPED | OUTPATIENT
Start: 2023-02-19

## 2023-02-19 NOTE — PROGRESS NOTES
[facility]        NAME: Yoni Birch is a 53 y o  male  : 1975    MRN: [id number]  DATE: 2023  TIME: 1:33 PM    Assessment and Plan   Acute URI [J06 9]  1  Acute URI  POCT rapid strepA    Throat culture    Covid19 and INFLUENZA A/B PCR    Promethazine-DM (PHENERGAN-DM) 6 25-15 mg/5 mL oral syrup            Patient Instructions   1  Over-the-counter ibuprofen and/or acetaminophen as needed for pain or fever  2  Oxymetazoline nasal spray 2 sprays in each nostril every 12 hours for no more than the next 5 days as needed for nasal congestion  3  Over-the-counter guaifenesin as needed for mucus relief  4  Gargle salt water as needed for sore throat relief  5  Increase oral fluid consumption  6  Follow-up with primary care provider in 7 days for any persistent symptoms  Chief Complaint     Chief Complaint   Patient presents with   • Sore Throat     Pt reports sore throat and difficulty swallowing, head congestion, fatigue x2 days         History of Present Illness       50-year-old male patient with a 2-day history of sore throat, nasal congestion, some eye productive cough, intermittent low-grade fever, fatigue  Patient denies any chest pain or shortness of breath  No GI symptoms  Patient states that the sore throat and cough seem to be worse at night  Review of Systems   Review of Systems   Constitutional: Positive for fatigue and fever  HENT: Positive for congestion and sore throat  Negative for facial swelling, rhinorrhea and sinus pain  Respiratory: Positive for cough  Cardiovascular: Negative for chest pain  Gastrointestinal: Negative for abdominal pain  Musculoskeletal: Negative for myalgias  Skin: Negative for rash           Current Medications       Current Outpatient Medications:   •  ALPRAZolam (XANAX) 0 25 mg tablet, Take 1 tablet (0 25 mg total) by mouth 3 (three) times a day as needed for anxiety, Disp: 30 tablet, Rfl: 0  •  fluticasone (FLONASE) 50 mcg/act nasal spray, 1 spray into each nostril daily, Disp: , Rfl:   •  losartan (COZAAR) 50 mg tablet, take 1 tablet by mouth once daily, Disp: 90 tablet, Rfl: 3  •  pantoprazole (PROTONIX) 40 mg tablet, take 1 tablet by mouth once daily, Disp: 90 tablet, Rfl: 3  •  Promethazine-DM (PHENERGAN-DM) 6 25-15 mg/5 mL oral syrup, Take 5 mL by mouth 4 (four) times a day as needed for cough, Disp: 120 mL, Rfl: 0  •  albuterol (ProAir HFA) 90 mcg/act inhaler, Inhale 2 puffs every 6 (six) hours as needed for wheezing (Patient not taking: Reported on 5/10/2022), Disp: 8 5 g, Rfl: 0  •  fexofenadine-pseudoephedrine (ALLEGRA-D)  MG per tablet, Take 1 tablet by mouth daily (Patient not taking: Reported on 5/10/2022), Disp: , Rfl:   •  meloxicam (Mobic) 15 mg tablet, Take 1 tablet (15 mg total) by mouth daily for 14 days, Disp: 30 tablet, Rfl: 0    Current Allergies     Allergies as of 02/19/2023 - Reviewed 02/19/2023   Allergen Reaction Noted   • Levaquin [levofloxacin] GI Intolerance and Nausea Only 02/09/2015   • Rosuvastatin Myalgia 06/29/2016            The following portions of the patient's history were reviewed and updated as appropriate: allergies, current medications, past family history, past medical history, past social history, past surgical history and problem list      Past Medical History:   Diagnosis Date   • Back pain    • GERD (gastroesophageal reflux disease)        Past Surgical History:   Procedure Laterality Date   • APPENDECTOMY     • CHOLECYSTECTOMY     • HERNIA REPAIR      inguinal/umbilical - last assessed 6/29/16   • KS EXC B9 LESION MRGN XCP SK TG T/A/L 1 1-2 0 CM Right 1/20/2016    Procedure: REVISION SCAR ABDOMINAL Exploration of painful scar on right upper abdomen;  Surgeon: Pierre Jon MD;  Location: [address] [place] [address];  Service: General   • KS REPAIR FIRST ABDOMINAL WALL HERNIA N/A 1/20/2016    Procedure: REPAIR HERNIA VENTRAL (SMALL EPIGASTRIC HERNIA);   Surgeon: Pierre Jon MD; Location: [facility];  Service: General       Family History   Problem Relation Age of Onset   • Cancer Mother         lung   • Hypertension Mother    • Arthritis Mother    • Hypertension Father    • Hiatal hernia Father    • Arthritis Father    • Anxiety disorder Father    • Cancer Maternal Uncle          Medications have been verified  Objective   /86 (BP Location: Left arm)   Pulse 65   Temp 97 6 °F (36 4 °C) (Temporal)   Resp 18   Ht 5' 10" (1 778 m)   Wt 99 8 kg (220 lb)   SpO2 99%   BMI 31 57 kg/m²        Physical Exam     Physical Exam  Vitals and nursing note reviewed  Constitutional:       General: He is not in acute distress  Appearance: Normal appearance  He is not ill-appearing  HENT:      Head: Normocephalic  Right Ear: Tympanic membrane normal       Left Ear: Tympanic membrane normal       Nose: Congestion present  No rhinorrhea  Mouth/Throat:      Mouth: Mucous membranes are moist       Pharynx: Oropharynx is clear  Posterior oropharyngeal erythema present  No pharyngeal swelling or oropharyngeal exudate  Tonsils: No tonsillar exudate  0 on the right  0 on the left  Eyes:      Conjunctiva/sclera: Conjunctivae normal       Pupils: Pupils are equal, round, and reactive to light  Cardiovascular:      Rate and Rhythm: Normal rate and regular rhythm  Pulses: Normal pulses  Heart sounds: Normal heart sounds  Pulmonary:      Effort: Pulmonary effort is normal       Breath sounds: Normal breath sounds  Abdominal:      Tenderness: There is no abdominal tenderness  Musculoskeletal:         General: Normal range of motion  Cervical back: Normal range of motion and neck supple  Skin:     General: Skin is warm and dry  Capillary Refill: Capillary refill takes less than 2 seconds  Neurological:      Mental Status: He is alert and oriented to person, place, and time     Psychiatric:         Mood and Affect: Mood normal          Behavior: Behavior normal

## 2023-02-20 LAB
FLUAV RNA RESP QL NAA+PROBE: NEGATIVE
FLUBV RNA RESP QL NAA+PROBE: NEGATIVE
SARS-COV-2 RNA RESP QL NAA+PROBE: NEGATIVE

## 2023-02-22 LAB — BACTERIA THROAT CULT: ABNORMAL

## 2023-02-27 ENCOUNTER — TELEPHONE (OUTPATIENT)
Dept: URGENT CARE | Facility: MEDICAL CENTER | Age: 48
End: 2023-02-27

## 2023-02-27 NOTE — TELEPHONE ENCOUNTER
I called and spoke to the patient's wife regarding his throat culture being positive for nongroup a strep  She states that overall his symptoms have improved although he does continue with some mild intermittent sore throat, nasal congestion, postnasal drainage  I did state that we would offer him an antibiotic at this point due to persistent symptoms or it would be reasonable to continue to see if symptoms improved on their own over the next 5 to 7 days  Patient's wife states that they would discuss this and get back to me sometime today if they desire an antibiotic  She thanked me for my call

## 2023-05-05 ENCOUNTER — OFFICE VISIT (OUTPATIENT)
Dept: FAMILY MEDICINE CLINIC | Facility: MEDICAL CENTER | Age: 48
End: 2023-05-05

## 2023-05-05 VITALS
HEART RATE: 70 BPM | SYSTOLIC BLOOD PRESSURE: 120 MMHG | TEMPERATURE: 98.5 F | WEIGHT: 222 LBS | HEIGHT: 70 IN | DIASTOLIC BLOOD PRESSURE: 78 MMHG | BODY MASS INDEX: 31.78 KG/M2 | OXYGEN SATURATION: 97 %

## 2023-05-05 DIAGNOSIS — R21 RASH: Primary | ICD-10-CM

## 2023-05-05 RX ORDER — NYSTATIN 100000 U/G
CREAM TOPICAL 2 TIMES DAILY
Qty: 30 G | Refills: 0 | Status: SHIPPED | OUTPATIENT
Start: 2023-05-05 | End: 2023-05-19

## 2023-05-05 RX ORDER — NYSTATIN 100000 [USP'U]/G
POWDER TOPICAL 2 TIMES DAILY
Qty: 15 G | Refills: 0 | Status: SHIPPED | OUTPATIENT
Start: 2023-05-05

## 2023-05-05 NOTE — PROGRESS NOTES
"  [facility] - Clinic Note  Nicole Jimenez, [place], 24     Mitra Raya MRN: [id number] : 1975 Age: 53 y o  Assessment/Plan     1  Rash    -Discussed with patient this rash appears consistent with fungal rash  -Advised patient to keep area clean and dry, avoid occlusive clothing  -Advised about topical antifungal cream twice daily x2 weeks, can switch to powder if needed  - nystatin (MYCOSTATIN) cream; Apply topically 2 (two) times a day for 14 days  Dispense: 30 g; Refill: 0  - nystatin (MYCOSTATIN) powder; Apply topically 2 (two) times a day  Dispense: 15 g; Refill: 0  -Call if symptoms not improving    Mitra Raya acknowledged understanding of treatment plan, all questions answered  Subjective      Mitra Raya is a 53 y o  male who presents for acute visit  Complaining of rash onset about a month ago, \"comes and goes but, last couple of days painful\"  No fever, no chills, no arthalgias  Rash located in bilateral armpit regions  Has been using same deodorant for years  \"Once in a while\" pruritic  Rash is red, not raised       The following portions of the patient's history were reviewed and updated as appropriate: allergies, current medications, past family history, past medical history, past social history, past surgical history and problem list      Past Medical History:   Diagnosis Date    Back pain     GERD (gastroesophageal reflux disease)        Allergies   Allergen Reactions    Levaquin [Levofloxacin] GI Intolerance and Nausea Only    Rosuvastatin Myalgia       Past Surgical History:   Procedure Laterality Date    APPENDECTOMY      CHOLECYSTECTOMY      HERNIA REPAIR      inguinal/umbilical - last assessed 16    MN EXC B9 LESION MRGN XCP SK TG T/A/L 1 1-2 0 CM Right 2016    Procedure: REVISION SCAR ABDOMINAL Exploration of painful scar on right upper abdomen;  Surgeon: Abdullahi Jain MD;  Location: [address] [place] [address];  Service: General    [facility] [address] " ABDOMINAL WALL HERNIA N/A 1/20/2016    Procedure: REPAIR HERNIA VENTRAL (SMALL EPIGASTRIC HERNIA);   Surgeon: Jarrod Chavez MD;  Location: [facility];  Service: General       Family History   Problem Relation Age of Onset    Cancer Mother         lung    Hypertension Mother     Arthritis Mother     Hypertension Father     Hiatal hernia Father     Arthritis Father     Anxiety disorder Father     Cancer Maternal Uncle        Social History     Socioeconomic History    Marital status: /Civil Union     Spouse name: None    Number of children: None    Years of education: None    Highest education level: None   Occupational History    None   Tobacco Use    Smoking status: Never    Smokeless tobacco: Never   Vaping Use    Vaping Use: Never used   Substance and Sexual Activity    Alcohol use: No    Drug use: No    Sexual activity: None   Other Topics Concern    None   Social History Narrative    None     Social Determinants of Health     Financial Resource Strain: Not on file   Food Insecurity: Not on file   Transportation Needs: Not on file   Physical Activity: Not on file   Stress: Not on file   Social Connections: Not on file   Intimate Partner Violence: Not on file   Housing Stability: Not on file       Current Outpatient Medications   Medication Sig Dispense Refill    albuterol (ProAir HFA) 90 mcg/act inhaler Inhale 2 puffs every 6 (six) hours as needed for wheezing 8 5 g 0    ALPRAZolam (XANAX) 0 25 mg tablet Take 1 tablet (0 25 mg total) by mouth 3 (three) times a day as needed for anxiety 30 tablet 0    fexofenadine-pseudoephedrine (ALLEGRA-D)  MG per tablet Take 1 tablet by mouth daily      fluticasone (FLONASE) 50 mcg/act nasal spray 1 spray into each nostril daily      pantoprazole (PROTONIX) 40 mg tablet take 1 tablet by mouth once daily 90 tablet 3    losartan (COZAAR) 50 mg tablet take 1 tablet by mouth once daily 90 tablet 3    meloxicam (Mobic) 15 mg tablet Take 1 "tablet (15 mg total) by mouth daily for 14 days 30 tablet 0    Promethazine-DM (PHENERGAN-DM) 6 25-15 mg/5 mL oral syrup Take 5 mL by mouth 4 (four) times a day as needed for cough 120 mL 0     No current facility-administered medications for this visit  Review of Systems     As noted in HPI    Objective      /78 (BP Location: Left arm, Patient Position: Sitting, Cuff Size: Large)   Pulse 70   Temp 98 5 °F (36 9 °C) (Temporal)   Ht 5' 10\" (1 778 m)   Wt 101 kg (222 lb)   SpO2 97%   BMI 31 85 kg/m²     Physical Exam  Vitals reviewed  Constitutional:       General: He is not in acute distress  Appearance: Normal appearance  He is not ill-appearing or toxic-appearing  HENT:      Head: Normocephalic and atraumatic  Eyes:      Conjunctiva/sclera: Conjunctivae normal    Pulmonary:      Effort: Pulmonary effort is normal    Skin:     General: Skin is warm and dry  Findings: Rash (Pink/red patches bilateral armpit regions) present  Neurological:      Mental Status: He is alert and oriented to person, place, and time  Psychiatric:         Mood and Affect: Mood normal          Behavior: Behavior normal          Thought Content: Thought content normal          Judgment: Judgment normal              Some portions of this record may have been generated with voice recognition software  There may be translation, syntax, or grammatical errors  Occasional wrong word or \"sound-a-like\" substitutions may have occurred due to the inherent limitations of the voice recognition software  Read the chart carefully and recognize, using context, where substations may have occurred  If you have any questions, please contact the dictating provider for clarification or correction, as needed    "

## 2023-05-08 ENCOUNTER — OFFICE VISIT (OUTPATIENT)
Dept: FAMILY MEDICINE CLINIC | Facility: MEDICAL CENTER | Age: 48
End: 2023-05-08

## 2023-05-08 VITALS
HEART RATE: 67 BPM | HEIGHT: 70 IN | OXYGEN SATURATION: 98 % | DIASTOLIC BLOOD PRESSURE: 68 MMHG | BODY MASS INDEX: 32.07 KG/M2 | WEIGHT: 224 LBS | SYSTOLIC BLOOD PRESSURE: 124 MMHG | RESPIRATION RATE: 16 BRPM | TEMPERATURE: 98 F

## 2023-05-08 DIAGNOSIS — T26.01XA: Primary | ICD-10-CM

## 2023-05-08 DIAGNOSIS — R20.8 PAIN OF SKIN: ICD-10-CM

## 2023-05-08 RX ORDER — LIDOCAINE 50 MG/G
OINTMENT TOPICAL AS NEEDED
Qty: 30 G | Refills: 0 | Status: SHIPPED | OUTPATIENT
Start: 2023-05-08
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
## 2023-05-11 DIAGNOSIS — F41.9 ANXIETY: ICD-10-CM

## 2023-05-12 RX ORDER — ALPRAZOLAM 0.25 MG/1
0.25 TABLET ORAL 3 TIMES DAILY PRN
Qty: 30 TABLET | Refills: 0 | Status: SHIPPED | OUTPATIENT
Start: 2023-05-12

## 2023-06-22 ENCOUNTER — TELEPHONE (OUTPATIENT)
Dept: FAMILY MEDICINE CLINIC | Facility: MEDICAL CENTER | Age: 48
End: 2023-06-22
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
## 2023-06-22 RX ORDER — DOXYCYCLINE HYCLATE 100 MG/1
200 CAPSULE ORAL ONCE
Qty: 2 CAPSULE | Refills: 0 | Status: SHIPPED | OUTPATIENT
Start: 2023-06-22 | End: 2023-06-22

## 2023-06-22 NOTE — TELEPHONE ENCOUNTER
"Pt's wife called to ask advice  She removed a tick from pt last night  She was able to remove the entire tick and it was not engorged  Today, pt has a red spot with a \"hole\" and pt is very tired  Wife would like to know what to do    Routed to [place] - can you advise?   He is a patient of Dr Alex Cosby  "

## 2023-06-22 NOTE — TELEPHONE ENCOUNTER
Please inform patient I have sent in doxycycline 200 mg for a one-time dose  He should take both capsules for a total of 200 mg 1 time for prophylaxis as the tick was removed within the last 72 hours and he is currently symptomatic  If symptoms worsen or fail to improve, follow up with Dr Kerry Torres

## 2023-07-11 ENCOUNTER — OFFICE VISIT (OUTPATIENT)
Dept: FAMILY MEDICINE CLINIC | Facility: MEDICAL CENTER | Age: 48
End: 2023-07-11
Payer: COMMERCIAL

## 2023-07-11 VITALS
WEIGHT: 213 LBS | TEMPERATURE: 97.6 F | BODY MASS INDEX: 30.49 KG/M2 | HEART RATE: 82 BPM | HEIGHT: 70 IN | RESPIRATION RATE: 16 BRPM | SYSTOLIC BLOOD PRESSURE: 122 MMHG | DIASTOLIC BLOOD PRESSURE: 74 MMHG | OXYGEN SATURATION: 98 %

## 2023-07-11 DIAGNOSIS — R53.83 OTHER FATIGUE: Primary | ICD-10-CM
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
## 2023-07-11 PROCEDURE — 99213 OFFICE O/P EST LOW 20 MIN: CPT | Performed by: STUDENT IN AN ORGANIZED HEALTH CARE EDUCATION/TRAINING PROGRAM

## 2023-07-11 NOTE — PROGRESS NOTES
[facility] - Clinic Note  Jamie Valladares, 24     Logan Curiel MRN: [id number] : 1975 Age: 53 y.o. Assessment/Plan     1. Other fatigue    -Patient presents today with fatigue that he has noticed more so since tick bite a few weeks ago  -We will check lab work per orders  -Discussed with patient and wife if lab work unremarkable and fatigue persistent it is reasonable to proceed with sleep study  - Lyme Total AB W Reflex to IGM/IGG; Future  - ANAPLASMA PHAGOCYTOPHILUM AND EHRLICHIA CHAFFEENSIS ANTIBODY PANEL; Future  - CBC and differential; Future  - Comprehensive metabolic panel; Future  - TSH, 3rd generation with Free T4 reflex; Future    2. Tick bite, unspecified site, initial encounter    - Lyme Total AB W Reflex to IGM/IGG; Future  - ANAPLASMA PHAGOCYTOPHILUM AND EHRLICHIA CHAFFEENSIS ANTIBODY PANEL; Future    Logan Curiel acknowledged understanding of treatment plan, all questions answered. Subjective      Logan Curiel is a 53 y.o. male who presents for acute visit. Patient presents with his wife. A "couple of weeks ago" patient had a tick bite. He took prophylactic dose of doxycycline. Wife mentions "he keeps getting tonsil stones out, last couple of days looked swollen and red, he does gets tired with the tonsils". Patient also endorses back pain. 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 Wife endorses that patient does snore, "snore more when these (tonsils) are swollen". Patient also endorses headaches, bilateral frontal in distribution. No fever, no chills, no rashes. Wife mentions he has had some more back pain around tick bite site, right lower back.     The following portions of the patient's history were reviewed and updated as appropriate: allergies, current medications, past family history, past medical history, past social history, past surgical history and problem list.     Past Medical History:   Diagnosis Date   • Back pain    • GERD (gastroesophageal reflux disease)        Allergies   Allergen Reactions   • Levaquin [Levofloxacin] GI Intolerance and Nausea Only   • Rosuvastatin Myalgia       Past Surgical History:   Procedure Laterality Date   • APPENDECTOMY     • CHOLECYSTECTOMY     • HERNIA REPAIR      inguinal/umbilical - last assessed 6/29/16   • OH EXC B9 LESION MRGN XCP SK TG T/A/L 1.1-2.0 CM Right 1/20/2016    Procedure: REVISION SCAR ABDOMINAL Exploration of painful scar on right upper abdomen;  Surgeon: Jac Curling, MD;  Location: [facility];  Service: General   • [place] REPAIR FIRST ABDOMINAL WALL HERNIA N/A 1/20/2016    Procedure: REPAIR HERNIA VENTRAL (SMALL EPIGASTRIC HERNIA);   Surgeon: Jac Curling, MD;  Location: [facility];  Service: General       Family History   Problem Relation Age of Onset   • Cancer Mother         lung   • Hypertension Mother    • Arthritis Mother    • Hypertension Father    • Hiatal hernia Father    • Arthritis Father    • Anxiety disorder Father    • Cancer Maternal Uncle        Social History     Socioeconomic History   • Marital status: /Civil Union     Spouse name: None   • Number of children: None   • Years of education: None   • Highest education level: None   Occupational History   • None   Tobacco Use   • Smoking status: Never   • Smokeless tobacco: Never   Vaping Use   • Vaping Use: Never used   Substance and Sexual Activity   • Alcohol use: No   • Drug use: No   • Sexual activity: None   Other Topics Concern   • None   Social History Narrative   • None     Social Determinants of Health     Financial Resource Strain: Not on file   Food Insecurity: Not on file   Transportation Needs: Not on file   Physical Activity: Not on file   Stress: Not on file   Social Connections: Not on file   Intimate Partner Violence: Not on file   Housing Stability: Not on file       Current Outpatient Medications   Medication Sig Dispense Refill   • ALPRAZolam Elmon Gavia) 0.25 mg tablet Take 1 tablet (0.25 mg total) by mouth 3 (three) times a day as needed for anxiety 30 tablet 0   • fexofenadine-pseudoephedrine (ALLEGRA-D)  MG per tablet Take 1 tablet by mouth daily     • fluticasone (FLONASE) 50 mcg/act nasal spray 1 spray into each nostril daily     • losartan (COZAAR) 50 mg tablet take 1 tablet by mouth once daily 90 tablet 3   • nystatin (MYCOSTATIN) powder Apply topically 2 (two) times a day 15 g 0   • pantoprazole (PROTONIX) 40 mg tablet take 1 tablet by mouth once daily 90 tablet 3   • meloxicam (Mobic) 15 mg tablet Take 1 tablet (15 mg total) by mouth daily for 14 days 30 tablet 0   • nystatin (MYCOSTATIN) cream Apply topically 2 (two) times a day for 14 days 30 g 0     No current facility-administered medications for this visit. Review of Systems     As noted in HPI    Objective      /74 (BP Location: Left arm, Patient Position: Sitting, Cuff Size: Adult)   Pulse 82   Temp 97.6 °F (36.4 °C)   Resp 16   Ht 5' 10" (1.778 m)   Wt 96.6 kg (213 lb)   SpO2 98%   BMI 30.56 kg/m²     Physical Exam  Vitals reviewed. Constitutional:       General: He is not in acute distress. Appearance: Normal appearance. He is not ill-appearing, toxic-appearing or diaphoretic. HENT:      Head: Normocephalic and atraumatic. Right Ear: External ear normal.      Left Ear: External ear normal.      Nose: Nose normal.      Mouth/Throat:      Mouth: Mucous membranes are moist.      Pharynx: Oropharynx is clear. No oropharyngeal exudate or posterior oropharyngeal erythema. Tonsils: No tonsillar exudate or tonsillar abscesses. 1+ on the right. 1+ on the left. Eyes:      Extraocular Movements: Extraocular movements intact. Conjunctiva/sclera: Conjunctivae normal.      Pupils: Pupils are equal, round, and reactive to light. Cardiovascular:      Rate and Rhythm: Normal rate and regular rhythm. Pulses: Normal pulses.       Heart sounds: Normal heart sounds. Pulmonary:      Effort: Pulmonary effort is normal.      Breath sounds: Normal breath sounds. Abdominal:      General: Bowel sounds are normal.      Palpations: Abdomen is soft. Tenderness: There is no abdominal tenderness. Musculoskeletal:      Cervical back: Neck supple. Lumbar back: Tenderness present. No swelling, edema, deformity, spasms or bony tenderness. Normal range of motion. Negative right straight leg raise test and negative left straight leg raise test.        Back:       Right lower leg: No edema. Left lower leg: No edema. Lymphadenopathy:      Cervical: No cervical adenopathy. Skin:     General: Skin is warm and dry. Comments: Tick bit site right lumbar region no surrounding extensive erythema   Neurological:      General: No focal deficit present. Mental Status: He is alert and oriented to person, place, and time. Cranial Nerves: No cranial nerve deficit. Sensory: No sensory deficit. Motor: No weakness. Coordination: Romberg sign negative. Gait: Gait normal.   Psychiatric:         Mood and Affect: Mood normal.         Thought Content: Thought content normal.             Some portions of this record may have been generated with voice recognition software. There may be translation, syntax, or grammatical errors. Occasional wrong word or "sound-a-like" substitutions may have occurred due to the inherent limitations of the voice recognition software. Read the chart carefully and recognize, using context, where substations may have occurred. If you have any questions, please contact the dictating provider for clarification or correction, as needed.

## 2023-07-14 ENCOUNTER — APPOINTMENT (OUTPATIENT)
Dept: LAB | Facility: MEDICAL CENTER | Age: 48
End: 2023-07-14
Payer: COMMERCIAL

## 2023-07-14 DIAGNOSIS — R53.83 OTHER FATIGUE: ICD-10-CM
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
## 2023-07-14 LAB
ALBUMIN SERPL BCP-MCNC: 3.9 G/DL (ref 3.5–5)
ALP SERPL-CCNC: 65 U/L (ref 46–116)
ALT SERPL W P-5'-P-CCNC: 35 U/L (ref 12–78)
ANION GAP SERPL CALCULATED.3IONS-SCNC: 4 MMOL/L
AST SERPL W P-5'-P-CCNC: 16 U/L (ref 5–45)
B BURGDOR IGG+IGM SER QL IA: NEGATIVE
BASOPHILS # BLD AUTO: 0.09 THOUSANDS/ÂΜL (ref 0–0.1)
BASOPHILS NFR BLD AUTO: 1 % (ref 0–1)
BILIRUB SERPL-MCNC: 0.38 MG/DL (ref 0.2–1)
BUN SERPL-MCNC: 16 MG/DL (ref 5–25)
CALCIUM SERPL-MCNC: 9.2 MG/DL (ref 8.3–10.1)
CHLORIDE SERPL-SCNC: 109 MMOL/L (ref 96–108)
CO2 SERPL-SCNC: 27 MMOL/L (ref 21–32)
CREAT SERPL-MCNC: 1.04 MG/DL (ref 0.6–1.3)
EOSINOPHIL # BLD AUTO: 0.2 THOUSAND/ÂΜL (ref 0–0.61)
EOSINOPHIL NFR BLD AUTO: 3 % (ref 0–6)
ERYTHROCYTE [DISTWIDTH] IN BLOOD BY AUTOMATED COUNT: 13 % (ref 11.6–15.1)
GFR SERPL CREATININE-BSD FRML MDRD: 85 ML/MIN/1.73SQ M
GLUCOSE P FAST SERPL-MCNC: 95 MG/DL (ref 65–99)
HCT VFR BLD AUTO: 46 % (ref 36.5–49.3)
HGB BLD-MCNC: 14.9 G/DL (ref 12–17)
IMM GRANULOCYTES # BLD AUTO: 0.01 THOUSAND/UL (ref 0–0.2)
IMM GRANULOCYTES NFR BLD AUTO: 0 % (ref 0–2)
LYMPHOCYTES # BLD AUTO: 2.9 THOUSANDS/ÂΜL (ref 0.6–4.47)
LYMPHOCYTES NFR BLD AUTO: 38 % (ref 14–44)
MCH RBC QN AUTO: 27.3 PG (ref 26.8–34.3)
MCHC RBC AUTO-ENTMCNC: 32.4 G/DL (ref 31.4–37.4)
MCV RBC AUTO: 84 FL (ref 82–98)
MONOCYTES # BLD AUTO: 0.59 THOUSAND/ÂΜL (ref 0.17–1.22)
MONOCYTES NFR BLD AUTO: 8 % (ref 4–12)
NEUTROPHILS # BLD AUTO: 3.85 THOUSANDS/ÂΜL (ref 1.85–7.62)
NEUTS SEG NFR BLD AUTO: 50 % (ref 43–75)
NRBC BLD AUTO-RTO: 0 /100 WBCS
PLATELET # BLD AUTO: 347 THOUSANDS/UL (ref 149–390)
PMV BLD AUTO: 10.2 FL (ref 8.9–12.7)
POTASSIUM SERPL-SCNC: 4.2 MMOL/L (ref 3.5–5.3)
PROT SERPL-MCNC: 6.9 G/DL (ref 6.4–8.4)
RBC # BLD AUTO: 5.45 MILLION/UL (ref 3.88–5.62)
SODIUM SERPL-SCNC: 140 MMOL/L (ref 135–147)
TSH SERPL DL<=0.05 MIU/L-ACNC: 1.91 UIU/ML (ref 0.45–4.5)
WBC # BLD AUTO: 7.64 THOUSAND/UL (ref 4.31–10.16)

## 2023-07-14 PROCEDURE — 86618 LYME DISEASE ANTIBODY: CPT

## 2023-07-14 PROCEDURE — 84443 ASSAY THYROID STIM HORMONE: CPT

## 2023-07-14 PROCEDURE — 85025 COMPLETE CBC W/AUTO DIFF WBC: CPT

## 2023-07-14 PROCEDURE — 80053 COMPREHEN METABOLIC PANEL: CPT

## 2023-07-14 PROCEDURE — 86666 EHRLICHIA ANTIBODY: CPT

## 2023-07-14 PROCEDURE — 36415 COLL VENOUS BLD VENIPUNCTURE: CPT

## 2023-07-20 LAB
A PHAGOCYTOPH IGG TITR SER IF: NEGATIVE {TITER}
A PHAGOCYTOPH IGM TITR SER IF: NEGATIVE {TITER}
E CHAFFEENSIS IGG TITR SER IF: NEGATIVE {TITER}
E CHAFFEENSIS IGM TITR SER IF: NEGATIVE {TITER}

## 2023-07-25 ENCOUNTER — TELEPHONE (OUTPATIENT)
Dept: FAMILY MEDICINE CLINIC | Facility: MEDICAL CENTER | Age: 48
End: 2023-07-25

## 2023-07-25 DIAGNOSIS — R40.0 DAYTIME SLEEPINESS: ICD-10-CM

## 2023-07-25 DIAGNOSIS — R06.83 SNORING: Primary | ICD-10-CM

## 2023-07-25 NOTE — TELEPHONE ENCOUNTER
----- Message from Juanna Duverney, DO sent at 7/25/2023 12:04 PM EDT -----  Please inform patient that remaining testing for tickborne illness returned negative. I would recommend sleep medicine referral for sleep study for snoring and daytime fatigue. If patient agreeable can place order.

## 2023-09-08 ENCOUNTER — VBI (OUTPATIENT)
Dept: ADMINISTRATIVE | Facility: OTHER | Age: 48
End: 2023-09-08

## 2023-09-27 ENCOUNTER — OFFICE VISIT (OUTPATIENT)
Dept: FAMILY MEDICINE CLINIC | Facility: MEDICAL CENTER | Age: 48
End: 2023-09-27
Payer: COMMERCIAL

## 2023-09-27 VITALS
TEMPERATURE: 98 F | BODY MASS INDEX: 31.01 KG/M2 | HEART RATE: 88 BPM | WEIGHT: 216.6 LBS | SYSTOLIC BLOOD PRESSURE: 122 MMHG | DIASTOLIC BLOOD PRESSURE: 80 MMHG | HEIGHT: 70 IN

## 2023-09-27 DIAGNOSIS — Z00.00 ANNUAL PHYSICAL EXAM: Primary | ICD-10-CM

## 2023-09-27 DIAGNOSIS — Z13.1 SCREENING FOR DIABETES MELLITUS (DM): ICD-10-CM

## 2023-09-27 DIAGNOSIS — Z12.11 SCREEN FOR COLON CANCER: ICD-10-CM

## 2023-09-27 DIAGNOSIS — I10 ESSENTIAL HYPERTENSION: ICD-10-CM

## 2023-09-27 DIAGNOSIS — Z13.220 SCREENING CHOLESTEROL LEVEL: ICD-10-CM

## 2023-09-27 PROCEDURE — 99396 PREV VISIT EST AGE 40-64: CPT | Performed by: STUDENT IN AN ORGANIZED HEALTH CARE EDUCATION/TRAINING PROGRAM

## 2023-09-27 NOTE — PROGRESS NOTES
[facility] HEALTH MAINTENANCE OFFICE VISIT  [facility] - [facility] [place]    NAME: Marylou Edmond  AGE: 53 y.o. SEX: male  : 1975     DATE: 2023    Assessment and Plan     1. Annual physical exam    2. Screen for colon cancer  -     Ambulatory Referral to Gastroenterology; Future    3. BMI 31.0-31.9,adult  -     Lipid Panel with Direct LDL reflex; Future  -     HEMOGLOBIN A1C W/ EAG ESTIMATION; Future    4. Screening cholesterol level  -     Lipid Panel with Direct LDL reflex; Future    5. Screening for diabetes mellitus (DM)  -     HEMOGLOBIN A1C W/ EAG ESTIMATION; Future    6. Essential hypertension  -     Basic metabolic panel; Future; Expected date: 2024        · Patient Counseling:   · Nutrition: Stressed importance of a well balanced diet, moderation of sodium/saturated fat, caloric balance and sufficient intake of fiber  · Exercise: Stressed the importance of regular exercise with a goal of 150 minutes per week  · Dental Health: Discussed daily flossing and brushing and regular dental visits   · Injury Prevention: Discussed Safety Belts, Safety Helmets, and Smoke Detectors  · No alcohol use  · No tobacco use  · Uses sunscreen and sun protection appropriately  · Immunizations reviewed:   · Completed initial COVID-19 series, declines further related vaccinations  · Tetanus booster up-to-date  · Declines influenza vaccine, states when he received it last had to be hospitalized  · Discussed benefits of:    · Colon cancer screening, agreeable to colonoscopy, referral to gastroenterology placed  · No family history of prostate cancer  BMI Counseling: Body mass index is 31.08 kg/m². Discussed with patient's BMI with him. Follow-up in 2024 when BMP due and sooner as needed. BMI Counseling: Body mass index is 31.08 kg/m².  The BMI is above normal. Nutrition recommendations include encouraging healthy choices of fruits and vegetables, moderation in carbohydrate intake and increasing intake of lean protein. Exercise recommendations include exercising 3-5 times per week. Rationale for BMI follow-up plan is due to patient being overweight or obese. Chief Complaint     Chief Complaint   Patient presents with   • Annual Exam       History of Present Illness     HPI    Well Adult Physical   Patient here for a comprehensive physical exam.      Diet and Physical Activity  Diet: "could be better, meat potatoes and a veggie, we don't do fast food, likes his sweets"   Exercise: Patient is active however, does not have formal exercise routine    Depression Screen  PHQ-2/9 Depression Screening    Little interest or pleasure in doing things: 0 - not at all  Feeling down, depressed, or hopeless: 0 - not at all  Trouble falling or staying asleep, or sleeping too much: 0 - not at all  Feeling tired or having little energy: 0 - not at all  Poor appetite or overeatin - not at all  Feeling bad about yourself - or that you are a failure or have let yourself or your family down: 0 - not at all  Trouble concentrating on things, such as reading the newspaper or watching television: 0 - not at all  Moving or speaking so slowly that other people could have noticed.  Or the opposite - being so fidgety or restless that you have been moving around a lot more than usual: 0 - not at all  Thoughts that you would be better off dead, or of hurting yourself in some way: 0 - not at all  PHQ-9 Score: 0   PHQ-9 Interpretation: No or Minimal depression           General Health  Hearing: Slighty decreased: bilateral ; history of occupational noise exposure  Vision: goes for regular eye exams and wears bifocals   Dental: regular dental visits, brushes teeth twice daily and flosses teeth occasionally    Reproductive Health  No issues       The following portions of the patient's history were reviewed and updated as appropriate: allergies, current medications, past family history, past medical history, past social history, past surgical history and problem list.    Review of Systems     Review of Systems     As noted in HPI     Past Medical History     Past Medical History:   Diagnosis Date   • Back pain    • GERD (gastroesophageal reflux disease)        Past Surgical History     Past Surgical History:   Procedure Laterality Date   • APPENDECTOMY     • CHOLECYSTECTOMY     • HERNIA REPAIR      inguinal/umbilical - last assessed 6/29/16   • CT EXC B9 LESION MRGN XCP SK TG T/A/L 1.1-2.0 CM Right 1/20/2016    Procedure: REVISION SCAR ABDOMINAL Exploration of painful scar on right upper abdomen;  Surgeon: Wyatt Cameron MD;  Location: [facility];  Service: General   • CT REPAIR FIRST ABDOMINAL WALL HERNIA N/A 1/20/2016    Procedure: REPAIR HERNIA VENTRAL (SMALL EPIGASTRIC HERNIA);   Surgeon: Wyatt Cameron MD;  Location: [facility];  Service: General       Social History     Social History     Socioeconomic History   • Marital status: /Civil Union     Spouse name: None   • Number of children: None   • Years of education: None   • Highest education level: None   Occupational History   • None   Tobacco Use   • Smoking status: Never   • Smokeless tobacco: Never   Vaping Use   • Vaping Use: Never used   Substance and Sexual Activity   • Alcohol use: No   • Drug use: No   • Sexual activity: None   Other Topics Concern   • None   Social History Narrative   • None     Social Determinants of Health     Financial Resource Strain: Not on file   Food Insecurity: Not on file   Transportation Needs: Not on file   Physical Activity: Not on file   Stress: Not on file   Social Connections: Not on file   Intimate Partner Violence: Not on file   Housing Stability: Not on file       Family History     Family History   Problem Relation Age of Onset   • Cancer Mother         lung   • Hypertension Mother    • Arthritis Mother    • Hypertension Father    • Hiatal hernia Father    • Arthritis Father    • Anxiety disorder Father • Cancer Maternal Uncle        Current Medications       Current Outpatient Medications:   •  ALPRAZolam (XANAX) 0.25 mg tablet, Take 1 tablet (0.25 mg total) by mouth 3 (three) times a day as needed for anxiety, Disp: 30 tablet, Rfl: 0  •  fexofenadine-pseudoephedrine (ALLEGRA-D)  MG per tablet, Take 1 tablet by mouth daily, Disp: , Rfl:   •  fluticasone (FLONASE) 50 mcg/act nasal spray, 1 spray into each nostril daily, Disp: , Rfl:   •  losartan (COZAAR) 50 mg tablet, take 1 tablet by mouth once daily, Disp: 90 tablet, Rfl: 3  •  nystatin (MYCOSTATIN) powder, Apply topically 2 (two) times a day, Disp: 15 g, Rfl: 0  •  pantoprazole (PROTONIX) 40 mg tablet, take 1 tablet by mouth once daily, Disp: 90 tablet, Rfl: 3     Allergies     Allergies   Allergen Reactions   • Levaquin [Levofloxacin] GI Intolerance and Nausea Only   • Rosuvastatin Myalgia       Objective     /80 (BP Location: Left arm, Patient Position: Sitting, Cuff Size: Large)   Pulse 88   Temp 98 °F (36.7 °C)   Ht 5' 10" (1.778 m)   Wt 98.2 kg (216 lb 9.6 oz)   PF 96 L/min   BMI 31.08 kg/m²      Physical Exam  Vitals reviewed. Constitutional:       General: He is not in acute distress. Appearance: Normal appearance. HENT:      Head: Normocephalic and atraumatic. Right Ear: External ear normal.      Left Ear: External ear normal.      Nose: Nose normal.      Mouth/Throat:      Mouth: Mucous membranes are moist.      Pharynx: Oropharynx is clear. Eyes:      Extraocular Movements: Extraocular movements intact. Conjunctiva/sclera: Conjunctivae normal.      Pupils: Pupils are equal, round, and reactive to light. Cardiovascular:      Rate and Rhythm: Normal rate and regular rhythm. Pulses: Normal pulses. Heart sounds: Normal heart sounds. Pulmonary:      Effort: Pulmonary effort is normal.      Breath sounds: Normal breath sounds. Abdominal:      General: Abdomen is flat.  Bowel sounds are normal. Palpations: Abdomen is soft. Tenderness: There is no abdominal tenderness. Musculoskeletal:      Cervical back: Neck supple. Right lower leg: No edema. Left lower leg: No edema. Lymphadenopathy:      Cervical: No cervical adenopathy. Skin:     General: Skin is warm and dry. Capillary Refill: Capillary refill takes less than 2 seconds. Neurological:      Mental Status: He is alert and oriented to person, place, and time. Psychiatric:         Mood and Affect: Mood normal.         Behavior: Behavior normal.         Thought Content:  Thought content normal.         Judgment: Judgment normal.                 Juan Padilla,   [facility] [place]

## 2023-09-28 ENCOUNTER — PREP FOR PROCEDURE (OUTPATIENT)
Age: 48
End: 2023-09-28

## 2023-09-28 ENCOUNTER — TELEPHONE (OUTPATIENT)
Age: 48
End: 2023-09-28

## 2023-09-28 DIAGNOSIS — Z12.11 SCREENING FOR COLON CANCER: Primary | ICD-10-CM

## 2023-09-28 NOTE — TELEPHONE ENCOUNTER
Scheduled date of colonoscopy (as of today): 11-   Physician performing colonoscopy: Dr. Natasha Seo   [facility] of colon[facility]  Bowel prep reviewed with patient: sent via [employer]   Instructions reviewed with patient by: sent via [employer]   Clearances: n/a

## 2023-10-28 DIAGNOSIS — I10 ESSENTIAL HYPERTENSION: ICD-10-CM

## 2023-10-30 RX ORDER — LOSARTAN POTASSIUM 50 MG/1
TABLET ORAL
Qty: 90 TABLET | Refills: 1 | Status: SHIPPED | OUTPATIENT
Start: 2023-10-30

## 2023-11-14 ENCOUNTER — ANESTHESIA EVENT (OUTPATIENT)
Dept: ANESTHESIOLOGY | Facility: HOSPITAL | Age: 48
End: 2023-11-14

## 2023-11-14 ENCOUNTER — ANESTHESIA (OUTPATIENT)
Dept: ANESTHESIOLOGY | Facility: HOSPITAL | Age: 48
End: 2023-11-14

## 2023-11-22 DIAGNOSIS — K21.9 GASTROESOPHAGEAL REFLUX DISEASE: ICD-10-CM

## 2023-11-22 RX ORDER — PANTOPRAZOLE SODIUM 40 MG/1
TABLET, DELAYED RELEASE ORAL
Qty: 90 TABLET | Refills: 1 | Status: SHIPPED | OUTPATIENT
Start: 2023-11-22

## 2023-11-27 ENCOUNTER — TELEPHONE (OUTPATIENT)
Age: 48
End: 2023-11-27

## 2023-11-27 NOTE — TELEPHONE ENCOUNTER
Pts wife called in stating he is sick and wanted to reschedule the colonoscopy. Rescheduled to 2/6/24. Called and spoke to Ludwig Gaines at [facility] to let her know.

## 2023-12-01 ENCOUNTER — HOSPITAL ENCOUNTER (EMERGENCY)
Facility: HOSPITAL | Age: 48
Discharge: HOME | End: 2023-12-01
Attending: EMERGENCY MEDICINE
Payer: COMMERCIAL

## 2023-12-01 ENCOUNTER — APPOINTMENT (EMERGENCY)
Dept: CT IMAGING | Facility: HOSPITAL | Age: 48
End: 2023-12-01
Payer: COMMERCIAL

## 2023-12-01 ENCOUNTER — NURSE TRIAGE (OUTPATIENT)
Age: 48
End: 2023-12-01

## 2023-12-01 VITALS
TEMPERATURE: 98.2 F | HEART RATE: 82 BPM | DIASTOLIC BLOOD PRESSURE: 65 MMHG | SYSTOLIC BLOOD PRESSURE: 115 MMHG | RESPIRATION RATE: 18 BRPM | OXYGEN SATURATION: 97 %

## 2023-12-01 DIAGNOSIS — K57.92 DIVERTICULITIS: Primary | ICD-10-CM

## 2023-12-01 LAB
ALBUMIN SERPL BCP-MCNC: 3.7 G/DL (ref 3.5–5)
ALP SERPL-CCNC: 46 U/L (ref 34–104)
ALT SERPL W P-5'-P-CCNC: 18 U/L (ref 7–52)
ANION GAP SERPL CALCULATED.3IONS-SCNC: 6 MMOL/L
AST SERPL W P-5'-P-CCNC: 13 U/L (ref 13–39)
BASOPHILS # BLD AUTO: 0.06 THOUSANDS/ÂΜL (ref 0–0.1)
BASOPHILS NFR BLD AUTO: 1 % (ref 0–1)
BILIRUB SERPL-MCNC: 0.57 MG/DL (ref 0.2–1)
BUN SERPL-MCNC: 16 MG/DL (ref 5–25)
CALCIUM SERPL-MCNC: 8.4 MG/DL (ref 8.4–10.2)
CHLORIDE SERPL-SCNC: 104 MMOL/L (ref 96–108)
CO2 SERPL-SCNC: 26 MMOL/L (ref 21–32)
CREAT SERPL-MCNC: 1.17 MG/DL (ref 0.6–1.3)
EOSINOPHIL # BLD AUTO: 0.14 THOUSAND/ÂΜL (ref 0–0.61)
EOSINOPHIL NFR BLD AUTO: 1 % (ref 0–6)
ERYTHROCYTE [DISTWIDTH] IN BLOOD BY AUTOMATED COUNT: 13.3 % (ref 11.6–15.1)
GFR SERPL CREATININE-BSD FRML MDRD: 73 ML/MIN/1.73SQ M
GLUCOSE SERPL-MCNC: 93 MG/DL (ref 65–140)
HCT VFR BLD AUTO: 47.8 % (ref 36.5–49.3)
HGB BLD-MCNC: 15.3 G/DL (ref 12–17)
IMM GRANULOCYTES # BLD AUTO: 0.03 THOUSAND/UL (ref 0–0.2)
IMM GRANULOCYTES NFR BLD AUTO: 0 % (ref 0–2)
LIPASE SERPL-CCNC: 19 U/L (ref 11–82)
LYMPHOCYTES # BLD AUTO: 2.23 THOUSANDS/ÂΜL (ref 0.6–4.47)
LYMPHOCYTES NFR BLD AUTO: 23 % (ref 14–44)
MCH RBC QN AUTO: 26.9 PG (ref 26.8–34.3)
MCHC RBC AUTO-ENTMCNC: 32 G/DL (ref 31.4–37.4)
MCV RBC AUTO: 84 FL (ref 82–98)
MONOCYTES # BLD AUTO: 0.94 THOUSAND/ÂΜL (ref 0.17–1.22)
MONOCYTES NFR BLD AUTO: 10 % (ref 4–12)
NEUTROPHILS # BLD AUTO: 6.27 THOUSANDS/ÂΜL (ref 1.85–7.62)
NEUTS SEG NFR BLD AUTO: 65 % (ref 43–75)
NRBC BLD AUTO-RTO: 0 /100 WBCS
PLATELET # BLD AUTO: 329 THOUSANDS/UL (ref 149–390)
PMV BLD AUTO: 9.7 FL (ref 8.9–12.7)
POTASSIUM SERPL-SCNC: 3.9 MMOL/L (ref 3.5–5.3)
PROT SERPL-MCNC: 6 G/DL (ref 6.4–8.4)
RBC # BLD AUTO: 5.69 MILLION/UL (ref 3.88–5.62)
SODIUM SERPL-SCNC: 136 MMOL/L (ref 135–147)
WBC # BLD AUTO: 9.67 THOUSAND/UL (ref 4.31–10.16)

## 2023-12-01 PROCEDURE — 87493 C DIFF AMPLIFIED PROBE: CPT | Performed by: EMERGENCY MEDICINE

## 2023-12-01 PROCEDURE — 99285 EMERGENCY DEPT VISIT HI MDM: CPT | Performed by: EMERGENCY MEDICINE

## 2023-12-01 PROCEDURE — 96374 THER/PROPH/DIAG INJ IV PUSH: CPT

## 2023-12-01 PROCEDURE — 85025 COMPLETE CBC W/AUTO DIFF WBC: CPT | Performed by: EMERGENCY MEDICINE

## 2023-12-01 PROCEDURE — 96361 HYDRATE IV INFUSION ADD-ON: CPT

## 2023-12-01 PROCEDURE — 99284 EMERGENCY DEPT VISIT MOD MDM: CPT

## 2023-12-01 PROCEDURE — 36415 COLL VENOUS BLD VENIPUNCTURE: CPT | Performed by: EMERGENCY MEDICINE

## 2023-12-01 PROCEDURE — 80053 COMPREHEN METABOLIC PANEL: CPT | Performed by: EMERGENCY MEDICINE

## 2023-12-01 PROCEDURE — 74177 CT ABD & PELVIS W/CONTRAST: CPT

## 2023-12-01 PROCEDURE — 83690 ASSAY OF LIPASE: CPT | Performed by: EMERGENCY MEDICINE

## 2023-12-01 PROCEDURE — G1004 CDSM NDSC: HCPCS

## 2023-12-01 RX ORDER — AMOXICILLIN AND CLAVULANATE POTASSIUM 875; 125 MG/1; MG/1
1 TABLET, FILM COATED ORAL EVERY 12 HOURS
Qty: 14 TABLET | Refills: 0 | Status: SHIPPED | OUTPATIENT
Start: 2023-12-01 | End: 2023-12-08

## 2023-12-01 RX ORDER — ONDANSETRON 4 MG/1
4 TABLET, ORALLY DISINTEGRATING ORAL EVERY 6 HOURS PRN
Qty: 10 TABLET | Refills: 0 | Status: SHIPPED | OUTPATIENT
Start: 2023-12-01

## 2023-12-01 RX ORDER — AMOXICILLIN AND CLAVULANATE POTASSIUM 875; 125 MG/1; MG/1
1 TABLET, FILM COATED ORAL ONCE
Status: COMPLETED | OUTPATIENT
Start: 2023-12-01 | End: 2023-12-01

## 2023-12-01 RX ORDER — ACETAMINOPHEN 325 MG/1
975 TABLET ORAL ONCE
Status: COMPLETED | OUTPATIENT
Start: 2023-12-01 | End: 2023-12-01

## 2023-12-01 RX ORDER — ONDANSETRON 2 MG/ML
4 INJECTION INTRAMUSCULAR; INTRAVENOUS ONCE
Status: COMPLETED | OUTPATIENT
Start: 2023-12-01 | End: 2023-12-01

## 2023-12-01 RX ADMIN — AMOXICILLIN AND CLAVULANATE POTASSIUM 1 TABLET: 875; 125 TABLET, FILM COATED ORAL at 15:22

## 2023-12-01 RX ADMIN — ACETAMINOPHEN 975 MG: 325 TABLET, FILM COATED ORAL at 13:13

## 2023-12-01 RX ADMIN — SODIUM CHLORIDE 1000 ML: 0.9 INJECTION, SOLUTION INTRAVENOUS at 13:10

## 2023-12-01 RX ADMIN — ONDANSETRON 4 MG: 2 INJECTION INTRAMUSCULAR; INTRAVENOUS at 13:10

## 2023-12-01 RX ADMIN — IOHEXOL 85 ML: 350 INJECTION, SOLUTION INTRAVENOUS at 14:37

## 2023-12-01 NOTE — TELEPHONE ENCOUNTER
Regarding: Intense cramp under rib cage/diarrhea  ----- Message from [employer] sent at 12/1/2023  9:26 AM EST -----  Pt's wife called he has been battling diarrhea all week and now has an intense cramp under his rib cage. They almost went to ER last night. Secured same day appt today but not until later - please advise if should be seen sooner.

## 2023-12-01 NOTE — TELEPHONE ENCOUNTER
Reason for Disposition  • SEVERE abdominal pain (e.g., excruciating) and present > 1 hour    Answer Assessment - Initial Assessment Questions  1. DIARRHEA SEVERITY: "How bad is the diarrhea?" "How many extra stools have you had in the past 24 hours than normal?"     - NO DIARRHEA (SCALE 0)    - MILD (SCALE 1-3): Few loose or mushy BMs; increase of 1-3 stools over normal daily number of stools; mild increase in ostomy output. -  MODERATE (SCALE 4-7): Increase of 4-6 stools daily over normal; moderate increase in ostomy output. * SEVERE (SCALE 8-10; OR 'WORST POSSIBLE'): Increase of 7 or more stools daily over normal; moderate increase in ostomy output; incontinence. Severe severe diarrhea         2. ONSET: "When did the diarrhea begin?"           Few days ago     3. BM CONSISTENCY: "How loose or watery is the diarrhea?"           Watery       4. VOMITING: "Are you also vomiting?" If Yes, ask: "How many times in the past 24 hours?"          Yes      5. ABDOMINAL PAIN: "Are you having any abdominal pain?" If Yes, ask: "What does it feel like?" (e.g., crampy, dull, intermittent, constant)         Yes upper right abdominal pain       6. ABDOMINAL PAIN SEVERITY: If present, ask: "How bad is the pain?"  (e.g., Scale 1-10; mild, moderate, or severe)    - MILD (1-3): doesn't interfere with normal activities, abdomen soft and not tender to touch     - MODERATE (4-7): interferes with normal activities or awakens from sleep, tender to touch     - SEVERE (8-10): excruciating pain, doubled over, unable to do any normal activities         Moderate       7. ORAL INTAKE: If vomiting, "Have you been able to drink liquids?" "How much fluids have you had in the past 24 hours?"       Little intake       8. HYDRATION: "Any signs of dehydration?" (e.g., dry mouth [not just dry lips], too weak to stand, dizziness, new weight loss) "When did you last urinate?"         Yes     9.  EXPOSURE: "Have you traveled to a foreign country recently?" "Have you been exposed to anyone with diarrhea?" "Could you have eaten any food that was spoiled?"       denies      10. ANTIBIOTIC USE: "Are you taking antibiotics now or have you taken antibiotics in the past 2 months?"           Denies      11.  OTHER SYMPTOMS: "Do you have any other symptoms?" (e.g., fever, blood in stool)            Denies    Protocols used: Diarrhea-ADULT-[place]

## 2023-12-01 NOTE — DISCHARGE INSTRUCTIONS
Take amoxicillin-clavulanate antibiotic orally 2 times daily for the next week to treat diverticulitis infection. Ondansetron may be taken as prescribed if needed for nausea. You may take combination of acetaminophen and ibuprofen as directed on over-the-counter packaging if needed for pain. Stick with a liquid diet for 1 to 2 days followed by a low fiber diet until done with antibiotics and abdominal pain is resolved. Restart high-fiber diet afterwards to help prevent formation of additional diverticuli and incidence of infection. Return to the emergency department if needed for any worsening such as development of fever, increased discomfort or repeated vomiting. Keep your appointment as scheduled for February for colonoscopy.

## 2023-12-01 NOTE — ED PROVIDER NOTES
History  Chief Complaint   Patient presents with    Diarrhea     Pt reports diarrhea x4 days, abd cramping, nausea, denies vomiting     Patient is a 43-year-old male who presents to the emergency department gesturing to the right upper abdomen explained that he has been having a lot of discomfort in that region along with diarrhea. His wife accompanies him and contributes to history and relays that on Monday he felt poorly. On Tuesday he began experiencing the diarrhea and on Wednesday had firming of 1 stool following a dose of Imodium. Looser stools returned. These have been yellow in coloring. Last night discomfort was so intense he was unable to find a comfortable position to sleep. He did take Xanax thinking anxiety might be contributing to his feeling poorly and was unable to sleep despite its use which is very unusual.  He had intense nausea overnight and some dry heaves without emesis. Appetite has been diminished over the last few days. He has not had any fevers and denies any urinary abnormalities. Last week did have some nasal congestion. This has improved. No known sick contacts. No known bad food ingestions or recent antibiotics. Prior to Admission Medications   Prescriptions Last Dose Informant Patient Reported? Taking?    ALPRAZolam (XANAX) 0.25 mg tablet   No No   Sig: Take 1 tablet (0.25 mg total) by mouth 3 (three) times a day as needed for anxiety   fexofenadine-pseudoephedrine (ALLEGRA-D)  MG per tablet  Self Yes No   Sig: Take 1 tablet by mouth daily   fluticasone (FLONASE) 50 mcg/act nasal spray   Yes No   Si spray into each nostril daily   losartan (COZAAR) 50 mg tablet   No No   Sig: take 1 tablet by mouth once daily   nystatin (MYCOSTATIN) powder   No No   Sig: Apply topically 2 (two) times a day   pantoprazole (PROTONIX) 40 mg tablet   No No   Sig: take 1 tablet by mouth once daily      Facility-Administered Medications: None       Past Medical History: Diagnosis Date    Back pain     GERD (gastroesophageal reflux disease)        Past Surgical History:   Procedure Laterality Date    APPENDECTOMY      CHOLECYSTECTOMY      HERNIA REPAIR      inguinal/umbilical - last assessed 6/29/16    VT EXC B9 LESION MRGN XCP SK TG T/A/L 1.1-2.0 CM Right 1/20/2016    Procedure: REVISION SCAR ABDOMINAL Exploration of painful scar on right upper abdomen;  Surgeon: Tiffany Moy MD;  Location: [facility];  Service: General    VT REPAIR FIRST ABDOMINAL WALL HERNIA N/A 1/20/2016    Procedure: REPAIR HERNIA VENTRAL (SMALL EPIGASTRIC HERNIA); Surgeon: Tiffany Moy MD;  Location: [facility];  Service: General       Family History   Problem Relation Age of Onset    Cancer Mother         lung    Hypertension Mother     Arthritis Mother     Hypertension Father     Hiatal hernia Father     Arthritis Father     Anxiety disorder Father     Cancer Maternal Uncle      I have reviewed and agree with the history as documented. E-Cigarette/Vaping    E-Cigarette Use Never User      E-Cigarette/Vaping Substances    Nicotine No     THC No     CBD No     Flavoring No     Other No     Unknown No      Social History     Tobacco Use    Smoking status: Never    Smokeless tobacco: Never   Vaping Use    Vaping Use: Never used   Substance Use Topics    Alcohol use: No    Drug use: No       Review of Systems   All other systems reviewed and are negative. Physical Exam  Physical Exam  Vitals and nursing note reviewed. Constitutional:       Appearance: He is ill-appearing. HENT:      Head: Normocephalic. Mouth/Throat:      Mouth: Mucous membranes are moist.   Eyes:      General: No scleral icterus. Extraocular Movements: Extraocular movements intact. Conjunctiva/sclera: Conjunctivae normal.   Cardiovascular:      Rate and Rhythm: Normal rate and regular rhythm. Pulmonary:      Effort: Pulmonary effort is normal.      Breath sounds: Normal breath sounds.    Abdominal:      General: Bowel sounds are normal. There is no distension. Tenderness: There is abdominal tenderness (Maximal in the right mid abdomen, mild tenderness elsewhere. No guarding). There is no right CVA tenderness or left CVA tenderness. Musculoskeletal:         General: Normal range of motion. Skin:     General: Skin is warm and dry. Neurological:      Mental Status: He is alert and oriented to person, place, and time. Psychiatric:         Mood and Affect: Mood normal.         Behavior: Behavior normal.         Vital Signs  ED Triage Vitals   Temperature Pulse Respirations Blood Pressure SpO2   12/01/23 1144 12/01/23 1144 12/01/23 1144 12/01/23 1144 12/01/23 1144   98.2 °F (36.8 °C) 95 18 118/73 97 %      Temp Source Heart Rate Source Patient Position - Orthostatic VS BP Location FiO2 (%)   12/01/23 1144 12/01/23 1144 12/01/23 1454 12/01/23 1144 --   Oral Monitor Lying Right arm       Pain Score       --                  Vitals:    12/01/23 1144 12/01/23 1454   BP: 118/73 115/65   Pulse: 95 82   Patient Position - Orthostatic VS:  Lying         Visual Acuity      ED Medications  Medications   sodium chloride 0.9 % bolus 1,000 mL (0 mL Intravenous Stopped 12/1/23 1454)   ondansetron (ZOFRAN) injection 4 mg (4 mg Intravenous Given 12/1/23 1310)   acetaminophen (TYLENOL) tablet 975 mg (975 mg Oral Given 12/1/23 1313)   iohexol (OMNIPAQUE) 350 MG/ML injection (MULTI-DOSE) 85 mL (85 mL Intravenous Given 12/1/23 1437)   amoxicillin-clavulanate (AUGMENTIN) 875-125 mg per tablet 1 tablet (1 tablet Oral Given 12/1/23 1522)       Diagnostic Studies  Results Reviewed       Procedure Component Value Units Date/Time    Clostridium difficile toxin by PCR with EIA [[id number]] Collected: 12/01/23 1517    Lab Status:  In process Specimen: Stool from Rectum Updated: 12/01/23 1529    Comprehensive metabolic panel [[id number]]  (Abnormal) Collected: 12/01/23 1305    Lab Status: Final result Specimen: Blood from Arm, Right Updated: 12/01/23 1414     Sodium 136 mmol/L      Potassium 3.9 mmol/L      Chloride 104 mmol/L      CO2 26 mmol/L      ANION GAP 6 mmol/L      BUN 16 mg/dL      Creatinine 1.17 mg/dL      Glucose 93 mg/dL      Calcium 8.4 mg/dL      AST 13 U/L      ALT 18 U/L      Alkaline Phosphatase 46 U/L      Total Protein 6.0 g/dL      Albumin 3.7 g/dL      Total Bilirubin 0.57 mg/dL      eGFR 73 ml/min/1.73sq m     Narrative:      [facility] (CKD):     Stage 1 with normal or high GFR (GFR > 90 mL/min/1.73 square meters)    Stage 2 Mild CKD (GFR = 60-89 mL/min/1.73 square meters)    Stage 3A Moderate CKD (GFR = 45-59 mL/min/1.73 square meters)    Stage 3B Moderate CKD (GFR = 30-44 mL/min/1.73 square meters)    Stage 4 Severe CKD (GFR = 15-29 mL/min/1.73 square meters)    Stage 5 End Stage CKD (GFR <15 mL/min/1.73 square meters)  Note: GFR calculation is accurate only with a steady state creatinine    Lipase [[id number]]  (Normal) Collected: 12/01/23 1305    Lab Status: Final result Specimen: Blood from Arm, Right Updated: 12/01/23 1414     Lipase 19 u/L     CBC and differential [[id number]]  (Abnormal) Collected: 12/01/23 1305    Lab Status: Final result Specimen: Blood from Arm, Right Updated: 12/01/23 1331     WBC 9.67 Thousand/uL      RBC 5.69 Million/uL      Hemoglobin 15.3 g/dL      Hematocrit 47.8 %      MCV 84 fL      MCH 26.9 pg      MCHC 32.0 g/dL      RDW 13.3 %      MPV 9.7 fL      Platelets 463 Thousands/uL      nRBC 0 /100 WBCs      Neutrophils Relative 65 %      Immat GRANS % 0 %      Lymphocytes Relative 23 %      Monocytes Relative 10 %      Eosinophils Relative 1 %      Basophils Relative 1 %      Neutrophils Absolute 6.27 Thousands/µL      Immature Grans Absolute 0.03 Thousand/uL      Lymphocytes Absolute 2.23 Thousands/µL      Monocytes Absolute 0.94 Thousand/µL      Eosinophils Absolute 0.14 Thousand/µL      Basophils Absolute 0.06 Thousands/µL CT abdomen pelvis with contrast   Final Result by Rex Tineo MD (12/01 9909)      Mild inflammatory stranding adjacent to two diverticula off the sigmoid colon consistent with acute diverticulitis. The study was marked in [facility] for immediate notification. Workstation performed: [id number]                    Procedures  Procedures         ED Course  ED Course as of 12/02/23 0321   Fri Dec 01, 2023   1240 Differential diagnosis includes but is not limited to colitis, diverticulitis, gastroenteritis. Doubt bowel perforation or partial bowel obstruction. Given fluid losses and minimal appetite with decreased intake some concern for dehydration/renal insufficiency. 1339 CBC unremarkable. 1423 CMP and lipase within normal limits. 36 Study results including finding of diverticulitis discussed with patient and wife. We reviewed diet, use of antibiotics and reasons to return. Incidentally he was scheduled for a routine screening colonoscopy earlier this week and canceled it due to feeling poorly. It has already been rescheduled in February. I encouraged him to keep this appointment and indicated that if he had not already had the scheduled that we would recommend follow-up either with gastroenterology or colorectal surgery. Medical Decision Making  Problems Addressed:  Diverticulitis: acute illness or injury    Amount and/or Complexity of Data Reviewed  Independent Historian: spouse  Labs: ordered. Decision-making details documented in ED Course. Radiology: ordered. Decision-making details documented in ED Course. Risk  OTC drugs. Prescription drug management. Decision regarding hospitalization.              Disposition  Final diagnoses:   Diverticulitis     Time reflects when diagnosis was documented in both MDM as applicable and the Disposition within this note       Time User Action Codes Description Comment    12/1/2023  3:13 PM Olivia Cunninghamhaseeb Add [O32.87] Diverticulitis           ED Disposition       ED Disposition   Discharge    Condition   Stable    Date/Time   Fri Dec 1, 2023  3:13 PM    Comment   Komal Arrington discharge to home/self care. Follow-up Information       Follow up With Specialties Details Why Serena Alonzo[address]   [phone]      Vaibhav Coy MD Gastroenterology Go to  Your appointment is scheduled in February for colonoscopy [address] [place] [address]  [phone]              Discharge Medication List as of 12/1/2023  3:25 PM        START taking these medications    Details   amoxicillin-clavulanate (AUGMENTIN) 875-125 mg per tablet Take 1 tablet by mouth every 12 (twelve) hours for 7 days, Starting Fri 12/1/2023, Until Fri 12/8/2023, Normal      ondansetron (ZOFRAN-ODT) 4 mg disintegrating tablet Take 1 tablet (4 mg total) by mouth every 6 (six) hours as needed for nausea or vomiting, Starting Fri 12/1/2023, Normal           CONTINUE these medications which have NOT CHANGED    Details   ALPRAZolam (XANAX) 0.25 mg tablet Take 1 tablet (0.25 mg total) by mouth 3 (three) times a day as needed for anxiety, Starting Fri 5/12/2023, Normal      fexofenadine-pseudoephedrine (ALLEGRA-D)  MG per tablet Take 1 tablet by mouth daily, Historical Med      fluticasone (FLONASE) 50 mcg/act nasal spray 1 spray into each nostril daily, Historical Med      losartan (COZAAR) 50 mg tablet take 1 tablet by mouth once daily, Normal      nystatin (MYCOSTATIN) powder Apply topically 2 (two) times a day, Starting Fri 5/5/2023, Normal      pantoprazole (PROTONIX) 40 mg tablet take 1 tablet by mouth once daily, Normal             No discharge procedures on file.     PDMP Review         Value Time User    PDMP Reviewed  Yes 11/12/2021 11:02 AM Leonard Martin, [address] [place] [address]            ED Provider  Electronically Signed by             Siria Hobbs MD  12/02/23 0587

## 2023-12-01 NOTE — TELEPHONE ENCOUNTER
Patient called in stating his diarrhea is severe, abdominal pain in upper right side , nausea and vomiting started last night. Patient feels weak. Recommended ER evaluation.  Patient agreed and will head to [facility] ER

## 2023-12-02 LAB — C DIFF TOX B TCDB STL QL NAA+PROBE: NEGATIVE

## 2023-12-06 ENCOUNTER — OFFICE VISIT (OUTPATIENT)
Dept: FAMILY MEDICINE CLINIC | Facility: MEDICAL CENTER | Age: 48
End: 2023-12-06
Payer: COMMERCIAL

## 2023-12-06 ENCOUNTER — NURSE TRIAGE (OUTPATIENT)
Age: 48
End: 2023-12-06

## 2023-12-06 VITALS
HEART RATE: 78 BPM | WEIGHT: 213 LBS | DIASTOLIC BLOOD PRESSURE: 70 MMHG | SYSTOLIC BLOOD PRESSURE: 120 MMHG | RESPIRATION RATE: 18 BRPM | TEMPERATURE: 98.9 F | BODY MASS INDEX: 30.49 KG/M2 | OXYGEN SATURATION: 98 % | HEIGHT: 70 IN

## 2023-12-06 DIAGNOSIS — K57.32 SIGMOID DIVERTICULITIS: Primary | ICD-10-CM

## 2023-12-06 DIAGNOSIS — K76.89 HEPATIC CYST: ICD-10-CM

## 2023-12-06 PROCEDURE — 99213 OFFICE O/P EST LOW 20 MIN: CPT | Performed by: STUDENT IN AN ORGANIZED HEALTH CARE EDUCATION/TRAINING PROGRAM

## 2023-12-06 NOTE — PROGRESS NOTES
[facility] - Clinic Note  Lakshmi Hodges, 24     Fabrice Coleman MRN: [id number] : 1975 Age: 53 y.o. Assessment/Plan     1. Sigmoid diverticulitis    -Patient presents for follow-up, diagnosed with diverticulitis  -Patient having some right upper quadrant discomfort persistently, benign abdominal exam  -CT abdomen pelvis result reviewed  -Advised to complete Augmentin course as prescribed but, to take with full glass of water and with meal  -Continue probiotic  -Counseled extensively about low fiber diet during diverticulitis flare and to slowly advance as tolerated  -Advised about signs and symptoms which case to seek medical attention, he expressed understanding  -Educational material distributed    2023 CT abdomen pelvis with contrast:  FINDINGS:     ABDOMEN     LOWER CHEST: Atelectatic changes are noted at the lung bases. LIVER/BILIARY TREE: One or more subcentimeter sharply circumscribed low-density hepatic lesion(s) are noted, too small to accurately characterize, but statistically most likely to represent subcentimeter hepatic cysts. No suspicious solid hepatic lesion   is identified. Hepatic contours are normal.  No biliary dilatation. GALLBLADDER: Gallbladder is surgically absent. SPLEEN:  Unremarkable. PANCREAS:  Unremarkable. ADRENAL GLANDS:  Unremarkable. KIDNEYS/URETERS:  No hydronephrosis or urinary tract calculus. One or more sharply circumscribed subcentimeter renal hypodensities are present, too small to accurately characterize, and statistically most likely benign findings. According to recent   literature (Radiology 2019) no further workup of these findings is recommended. STOMACH AND BOWEL: There is inflammatory stranding around two adjacent diverticula of the sigmoid colon. There is no extraluminal gas or abscess. APPENDIX: There are expected postoperative changes of appendectomy. ABDOMINOPELVIC CAVITY:  No ascites. No pneumoperitoneum. No lymphadenopathy. VESSELS:  Unremarkable for patient's age. PELVIS     REPRODUCTIVE ORGANS:  Unremarkable for patient's age. URINARY BLADDER:  Unremarkable. ABDOMINAL WALL/INGUINAL REGIONS: Status post bilateral inguinal hernia repair. Small fat-containing umbilical hernia. OSSEOUS STRUCTURES:  No acute fracture or destructive osseous lesion. IMPRESSION:     Mild inflammatory stranding adjacent to two diverticula off the sigmoid colon consistent with acute diverticulitis. 2. Hepatic cyst    - US right upper quadrant; Future    Flora Doss acknowledged understanding of treatment plan, all questions answered. Return in 1 week for recheck. Subjective      Flora Doss is a 53 y.o. male who presents for ER follow-up. He was evaluated at [address] [place] [address] ER on 12/1/2023 for abdominal pain and diarrhea. In regards to bowel movements, patient states that " before yellow and clear now starting to thicken up and turning brown". He has been adherent with oral Augmentin course. Started a probiotic yesterday. Today, patient complains of right upper quadrant pain. This is location of pain he had upon presentation to the ER as well. Rates pain currently at 3 out of 10. Endorses some nausea, no vomiting. No fever. Endorses chills yesterday that have subsided. "First couple days broth, Jell-O". Monday of this week started eggs, toast, chicken and rice, "last night pancakes did not sit well".      The following portions of the patient's history were reviewed and updated as appropriate: allergies, current medications, past family history, past medical history, past social history, past surgical history and problem list.     Past Medical History:   Diagnosis Date    Back pain     GERD (gastroesophageal reflux disease)        Allergies   Allergen Reactions    Levaquin [Levofloxacin] GI Intolerance and Nausea Only    Rosuvastatin Myalgia Past Surgical History:   Procedure Laterality Date    APPENDECTOMY      CHOLECYSTECTOMY      HERNIA REPAIR      inguinal/umbilical - last assessed 6/29/16    NC EXC B9 LESION MRGN XCP SK TG T/A/L 1.1-2.0 CM Right 1/20/2016    Procedure: REVISION SCAR ABDOMINAL Exploration of painful scar on right upper abdomen;  Surgeon: Selma Desai MD;  Location: [facility];  Service: General    [place] REPAIR FIRST ABDOMINAL WALL HERNIA N/A 1/20/2016    Procedure: REPAIR HERNIA VENTRAL (SMALL EPIGASTRIC HERNIA);   Surgeon: Selma Desai MD;  Location: [facility];  Service: General       Family History   Problem Relation Age of Onset    Cancer Mother         lung    Hypertension Mother     Arthritis Mother     Hypertension Father     Hiatal hernia Father     Arthritis Father     Anxiety disorder Father     Cancer Maternal Uncle        Social History     Socioeconomic History    Marital status: /Civil Union     Spouse name: None    Number of children: None    Years of education: None    Highest education level: None   Occupational History    None   Tobacco Use    Smoking status: Never    Smokeless tobacco: Never   Vaping Use    Vaping Use: Never used   Substance and Sexual Activity    Alcohol use: No    Drug use: No    Sexual activity: None   Other Topics Concern    None   Social History Narrative    None     Social Determinants of Health     Financial Resource Strain: Not on file   Food Insecurity: Not on file   Transportation Needs: Not on file   Physical Activity: Not on file   Stress: Not on file   Social Connections: Not on file   Intimate Partner Violence: Not on file   Housing Stability: Not on file       Current Outpatient Medications   Medication Sig Dispense Refill    ALPRAZolam (XANAX) 0.25 mg tablet Take 1 tablet (0.25 mg total) by mouth 3 (three) times a day as needed for anxiety 30 tablet 0    amoxicillin-clavulanate (AUGMENTIN) 875-125 mg per tablet Take 1 tablet by mouth every 12 (twelve) hours for 7 days 14 tablet 0    fexofenadine-pseudoephedrine (ALLEGRA-D)  MG per tablet Take 1 tablet by mouth daily      fluticasone (FLONASE) 50 mcg/act nasal spray 1 spray into each nostril daily      losartan (COZAAR) 50 mg tablet take 1 tablet by mouth once daily 90 tablet 1    nystatin (MYCOSTATIN) powder Apply topically 2 (two) times a day 15 g 0    ondansetron (ZOFRAN-ODT) 4 mg disintegrating tablet Take 1 tablet (4 mg total) by mouth every 6 (six) hours as needed for nausea or vomiting 10 tablet 0    pantoprazole (PROTONIX) 40 mg tablet take 1 tablet by mouth once daily 90 tablet 1     No current facility-administered medications for this visit. Review of Systems     As noted in HPI    Objective      /70 (BP Location: Left arm, Patient Position: Sitting, Cuff Size: Adult)   Pulse 78   Temp 98.9 °F (37.2 °C)   Resp 18   Ht 5' 10" (1.778 m)   Wt 96.6 kg (213 lb)   SpO2 98%   BMI 30.56 kg/m²     Physical Exam  Vitals reviewed. Constitutional:       General: He is not in acute distress. Appearance: Normal appearance. He is not ill-appearing, toxic-appearing or diaphoretic. HENT:      Head: Normocephalic and atraumatic. Mouth/Throat:      Mouth: Mucous membranes are moist.      Pharynx: Oropharynx is clear. Cardiovascular:      Rate and Rhythm: Normal rate and regular rhythm. Pulses: Normal pulses. Heart sounds: Normal heart sounds. Pulmonary:      Effort: Pulmonary effort is normal.      Breath sounds: Normal breath sounds. Abdominal:      General: Abdomen is flat. Bowel sounds are normal.      Palpations: Abdomen is soft. Tenderness: There is no abdominal tenderness. There is no guarding or rebound. Skin:     General: Skin is warm and dry. Neurological:      Mental Status: He is alert and oriented to person, place, and time. Psychiatric:         Mood and Affect: Mood normal.         Behavior: Behavior normal.         Thought Content:  Thought content normal. Some portions of this record may have been generated with voice recognition software. There may be translation, syntax, or grammatical errors. Occasional wrong word or "sound-a-like" substitutions may have occurred due to the inherent limitations of the voice recognition software. Read the chart carefully and recognize, using context, where substations may have occurred. If you have any questions, please contact the dictating provider for clarification or correction, as needed.

## 2023-12-06 NOTE — PATIENT INSTRUCTIONS
Diverticulitis   WHAT YOU NEED TO KNOW:   Diverticulitis is a condition that causes small pockets along your intestine called diverticula to become inflamed or infected. This is caused by hard bowel movements, food, or bacteria that get stuck in the pockets. DISCHARGE INSTRUCTIONS:   Return to the emergency department if:   You have bowel movement or foul-smelling discharge leaking from your vagina or in your urine. You have severe diarrhea. You urinate less than usual or not at all. You are not able to have a bowel movement. You cannot stop vomiting. You have severe abdominal pain, a fever, and your abdomen is larger than usual.    You have new or increased blood in your bowel movements. Call your doctor if:   You have pain when you urinate. Your symptoms get worse or do not go away. You have questions or concerns about your condition or care. Medicines:   Antibiotics  may be given to help treat a bacterial infection. Prescription pain medicine  may be given. Ask your healthcare provider how to take this medicine safely. Some prescription pain medicines contain acetaminophen. Do not take other medicines that contain acetaminophen without talking to your healthcare provider. Too much acetaminophen may cause liver damage. Prescription pain medicine may cause constipation. Ask your healthcare provider how to prevent or treat constipation. Take your medicine as directed. Contact your healthcare provider if you think your medicine is not helping or if you have side effects. Tell your provider if you are allergic to any medicine. Keep a list of the medicines, vitamins, and herbs you take. Include the amounts, and when and why you take them. Bring the list or the pill bottles to follow-up visits. Carry your medicine list with you in case of an emergency. Clear liquid diet:  A clear liquid diet includes any liquids that you can see through.  Examples include water, ginger-hansel, cranberry or apple juice, frozen fruit ice, or broth. Stay on a clear liquid diet until your symptoms are gone, or as directed. Follow up with your doctor as directed: You may need to return for a colonoscopy. When your symptoms are gone, you may need a low-fat, high-fiber diet to prevent diverticulitis from developing again. Your healthcare provider or dietitian can help you create meal plans. Write down your questions so you remember to ask them during your visits. © Copyright Gerardo Mondragon 2023 Information is for End User's use only and may not be sold, redistributed or otherwise used for commercial purposes. The above information is an  only. It is not intended as medical advice for individual conditions or treatments. Talk to your doctor, nurse or pharmacist before following any medical regimen to see if it is safe and effective for you. Diverticulitis Diet   AMBULATORY CARE:   A diverticulitis diet  includes foods that allow your intestines to rest while you have diverticulitis. Diverticulitis is a condition that causes small pockets along your intestine called diverticula to become inflamed or infected. This is caused by hard bowel movement, food, or bacteria that get stuck in the pockets. Call your doctor or dietitian if:   Your symptoms do not get better, or they get worse. You have questions about the foods you should eat. You have questions or concerns about your condition or care. Foods that may be recommended while you have diverticulitis:   A clear liquid diet may be recommended for 2 to 3 days. A clear liquid diet includes clear liquids, and foods that are liquid at room temperature.  Examples include the following:     Water and clear juices (such as apple, cranberry, or grape), strained citrus juices or fruit punch    Coffee or tea (without cream or milk)    Clear sports drinks or soft drinks, such as ginger ale, lemon-lime soda, or club soda (no cola or root beer)    Clear broth, bouillon, or consommé    Plain popsicles (no popsicles with pureed fruit or fiber)    Flavored gelatin without fruit    Low-fiber foods may be recommended until your symptoms improve. Examples include the following:     Cream of wheat and finely ground grits    White bread, white pasta, and white rice    Canned and well-cooked fruit without skins or seeds, and juice without pulp    Canned and well-cooked vegetables without skins or seeds, and vegetable juice    Cow's milk, lactose-free milk, soy milk, and rice milk    Yogurt, cottage cheese, and sherbet    Eggs, poultry (such as chicken and turkey), fish, and tender, ground, well-cooked beef    Tofu and smooth nut butters, such as peanut butter    Broth and strained soups made of low-fiber foods    High-fiber foods  can help prevent diverticulosis and diverticulitis. Your healthcare provider will tell you when you can add high-fiber foods back into your diet. Examples include the following:  Whole grains and breads, and cereals made with whole grains    Dried fruit, fresh fruit with skin, and fruit pulp    Raw vegetables    Cooked greens, such as spinach    Tough meat and meat with gristle    Legumes, such as miller beans and lentils       © Copyright [employer] 2023 Information is for End User's use only and may not be sold, redistributed or otherwise used for commercial purposes. The above information is an  only. It is not intended as medical advice for individual conditions or treatments. Talk to your doctor, nurse or pharmacist before following any medical regimen to see if it is safe and effective for you.

## 2023-12-06 NOTE — TELEPHONE ENCOUNTER
Reason for Disposition  • Patient sounds very sick or weak to the triager    Answer Assessment - Initial Assessment Questions  ONSET: "When did the pain begin?" (Minutes, hours or days ago)       12/1     PATTERN "Does the pain come and go, or is it constant?"     - If constant: "Is it getting better, staying the same, or worsening?"       (Note: Constant means the pain never goes away completely; most serious pain is constant and it progresses)      - If intermittent: "How long does it last?" "Do you have pain now?"      (Note: Intermittent means the pain goes away completely between bouts)      Intermittent      SEVERITY: "How bad is the pain?"  (e.g., Scale 1-10; mild, moderate, or severe)     - MILD (1-3): doesn't interfere with normal activities, abdomen soft and not tender to touch      - MODERATE (4-7): interferes with normal activities or awakens from sleep, tender to touch      - SEVERE (8-10): excruciating pain, doubled over, unable to do any normal activities        Severe   CAUSE: "What do you think is causing the stomach pain?"      Diverticulitis     OTHER SYMPTOMS: "Has there been any vomiting, diarrhea, constipation, or urine problems?"        Felt like he was going to pass out , diarrhea    Protocols used: Abdominal Pain - Male-ADULT-[place]

## 2023-12-06 NOTE — TELEPHONE ENCOUNTER
Regarding: Diarrhea/Abdominal Cramping  ----- Message from Guillermina Reed sent at 12/6/2023 10:32 AM EST -----  Patient was in ER on 12/1 for diarrhea/ abdominal cramping. Wife called to set up follow up appt, which is scheduled for Monday. However, she would like to speak with a nurse in the meantime because patient is not feeling any better and is having severe cramping and cannot sleep at night. Please call wife back to discuss.

## 2023-12-13 ENCOUNTER — HOSPITAL ENCOUNTER (OUTPATIENT)
Dept: ULTRASOUND IMAGING | Facility: HOSPITAL | Age: 48
Discharge: HOME | End: 2023-12-13
Payer: COMMERCIAL

## 2023-12-13 DIAGNOSIS — K76.89 HEPATIC CYST: ICD-10-CM

## 2023-12-13 PROCEDURE — 76705 ECHO EXAM OF ABDOMEN: CPT

## 2023-12-14 ENCOUNTER — OFFICE VISIT (OUTPATIENT)
Dept: FAMILY MEDICINE CLINIC | Facility: MEDICAL CENTER | Age: 48
End: 2023-12-14
Payer: COMMERCIAL

## 2023-12-14 VITALS
DIASTOLIC BLOOD PRESSURE: 70 MMHG | OXYGEN SATURATION: 94 % | TEMPERATURE: 97.8 F | HEART RATE: 68 BPM | WEIGHT: 213 LBS | SYSTOLIC BLOOD PRESSURE: 124 MMHG | HEIGHT: 70 IN | BODY MASS INDEX: 30.49 KG/M2

## 2023-12-14 DIAGNOSIS — Z09 FOLLOW-UP EXAM AFTER TREATMENT: Primary | ICD-10-CM

## 2023-12-14 PROCEDURE — 99213 OFFICE O/P EST LOW 20 MIN: CPT | Performed by: STUDENT IN AN ORGANIZED HEALTH CARE EDUCATION/TRAINING PROGRAM

## 2023-12-14 NOTE — PROGRESS NOTES
[facility]  [place], [address]     Marylene Gemma MRN: [id number] : 1975 Age: 53 y.o. Assessment/Plan     1. Follow-up exam after treatment    -Patient presents for recheck after completing Augmentin course for diverticulitis  -Patient has follow-up with gastroenterology on 2024, colonoscopy scheduled for 2024  -Benign abdominal exam today  -Rating current diet well  -Patient still complains of some right upper quadrant discomfort, right upper quadrant ultrasound completed, will follow-up result  -Counseled patient and wife about signs and symptoms of diverticulitis flare to be aware of    Marylene Gemma acknowledged understanding of treatment plan, all questions answered. Subjective      Marylene Gemma is a 53 y.o. male who presents for recheck. He was evaluated in the ER on 2023, diagnosed and treated for diverticulitis. He has completed course of Augmentin. Had follow-up visit with myself on 2023. He presents today with his wife. "Diarrhea subsided 2 days ago". Patient states he is hydrating well. Patient had pizza last night without issues. No fever. Patient still complains of intermittent right upper quadrant discomfort. Status post cholecystectomy.     The following portions of the patient's history were reviewed and updated as appropriate: allergies, current medications, past family history, past medical history, past social history, past surgical history and problem list.     Past Medical History:   Diagnosis Date    Allergic     Anxiety     Back pain     GERD (gastroesophageal reflux disease)     Hypertension        Allergies   Allergen Reactions    Levaquin [Levofloxacin] GI Intolerance and Nausea Only    Rosuvastatin Myalgia       Past Surgical History:   Procedure Laterality Date    APPENDECTOMY      CHOLECYSTECTOMY      HERNIA REPAIR      inguinal/umbilical - last assessed 16    CA EXC B9 LESION MRGN XCP SK TG T/A/L 1.1-2.0 CM Right 1/20/2016    Procedure: REVISION SCAR ABDOMINAL Exploration of painful scar on right upper abdomen;  Surgeon: Janel aEst MD;  Location: [facility];  Service: General    MO REPAIR FIRST ABDOMINAL WALL HERNIA N/A 1/20/2016    Procedure: REPAIR HERNIA VENTRAL (SMALL EPIGASTRIC HERNIA);   Surgeon: Janel East MD;  Location: [facility];  Service: General       Family History   Problem Relation Age of Onset    Cancer Mother         lung    Hypertension Mother     Arthritis Mother     Hypertension Father     Hiatal hernia Father     Arthritis Father     Anxiety disorder Father     Cancer Maternal Uncle        Social History     Socioeconomic History    Marital status: /Civil Union     Spouse name: None    Number of children: None    Years of education: None    Highest education level: None   Occupational History    None   Tobacco Use    Smoking status: Never    Smokeless tobacco: Never   Vaping Use    Vaping status: Never Used   Substance and Sexual Activity    Alcohol use: No    Drug use: No    Sexual activity: None   Other Topics Concern    None   Social History Narrative    None     Social Determinants of Health     Financial Resource Strain: Not on file   Food Insecurity: Not on file   Transportation Needs: Not on file   Physical Activity: Not on file   Stress: Not on file   Social Connections: Not on file   Intimate Partner Violence: Not on file   Housing Stability: Not on file       Current Outpatient Medications   Medication Sig Dispense Refill    ALPRAZolam (XANAX) 0.25 mg tablet Take 1 tablet (0.25 mg total) by mouth 3 (three) times a day as needed for anxiety 30 tablet 0    fexofenadine-pseudoephedrine (ALLEGRA-D)  MG per tablet Take 1 tablet by mouth daily      fluticasone (FLONASE) 50 mcg/act nasal spray 1 spray into each nostril daily      losartan (COZAAR) 50 mg tablet take 1 tablet by mouth once daily 90 tablet 1    nystatin (MYCOSTATIN) powder Apply topically 2 (two) times a day 15 g 0    ondansetron (ZOFRAN-ODT) 4 mg disintegrating tablet Take 1 tablet (4 mg total) by mouth every 6 (six) hours as needed for nausea or vomiting 10 tablet 0    pantoprazole (PROTONIX) 40 mg tablet take 1 tablet by mouth once daily 90 tablet 1     No current facility-administered medications for this visit. Review of Systems     As noted in HPI    Objective      /70 (BP Location: Left arm, Patient Position: Sitting, Cuff Size: Large)   Pulse 68   Temp 97.8 °F (36.6 °C)   Ht 5' 10" (1.778 m)   Wt 96.6 kg (213 lb)   SpO2 94%   BMI 30.56 kg/m²     Physical Exam  Vitals reviewed. Constitutional:       General: He is not in acute distress. Appearance: Normal appearance. He is not ill-appearing or toxic-appearing. HENT:      Head: Normocephalic and atraumatic. Mouth/Throat:      Mouth: Mucous membranes are moist.      Pharynx: Oropharynx is clear. Eyes:      Conjunctiva/sclera: Conjunctivae normal.   Cardiovascular:      Rate and Rhythm: Normal rate and regular rhythm. Pulses: Normal pulses. Heart sounds: Normal heart sounds. Pulmonary:      Effort: Pulmonary effort is normal.      Breath sounds: Normal breath sounds. Abdominal:      General: Abdomen is flat. Bowel sounds are normal.      Palpations: Abdomen is soft. Tenderness: There is no abdominal tenderness. There is no guarding or rebound. Skin:     General: Skin is warm and dry. Neurological:      Mental Status: He is alert and oriented to person, place, and time. Psychiatric:         Mood and Affect: Mood normal.         Behavior: Behavior normal.         Thought Content: Thought content normal.             Some portions of this record may have been generated with voice recognition software. There may be translation, syntax, or grammatical errors.  Occasional wrong word or "sound-a-like" substitutions may have occurred due to the inherent limitations of the voice recognition software. Read the chart carefully and recognize, using context, where substations may have occurred. If you have any questions, please contact the dictating provider for clarification or correction, as needed.

## 2023-12-20 ENCOUNTER — TELEPHONE (OUTPATIENT)
Dept: FAMILY MEDICINE CLINIC | Facility: MEDICAL CENTER | Age: 48
End: 2023-12-20

## 2023-12-20 NOTE — TELEPHONE ENCOUNTER
Patient and spouse returned call for results. RN reviewed provider comments regarding fatty liver and dietary changes. Patient reports restrictions due to food/seafood allergy and they refraim from fast, fatty, and fried foods. Please follow up with patient for providers recommendations for dietary changes. May upload to My Chart.

## 2023-12-20 NOTE — TELEPHONE ENCOUNTER
Educational material regarding fatty liver sent via [employer].  If patient would like to meet with nutritionist as well can place referral for that.

## 2023-12-20 NOTE — TELEPHONE ENCOUNTER
Left a voicemail for the patient to call back. Please relay the message and/or results below when the patient returns the call.  Thank you!

## 2023-12-27 ENCOUNTER — OFFICE VISIT (OUTPATIENT)
Dept: URGENT CARE | Facility: MEDICAL CENTER | Age: 48
End: 2023-12-27
Payer: COMMERCIAL

## 2023-12-27 ENCOUNTER — NURSE TRIAGE (OUTPATIENT)
Age: 48
End: 2023-12-27

## 2023-12-27 VITALS
HEART RATE: 70 BPM | SYSTOLIC BLOOD PRESSURE: 135 MMHG | TEMPERATURE: 98.9 F | OXYGEN SATURATION: 94 % | DIASTOLIC BLOOD PRESSURE: 69 MMHG | BODY MASS INDEX: 29.35 KG/M2 | HEIGHT: 70 IN | RESPIRATION RATE: 19 BRPM | WEIGHT: 205 LBS

## 2023-12-27 DIAGNOSIS — R68.89 FLU-LIKE SYMPTOMS: Primary | ICD-10-CM

## 2023-12-27 PROCEDURE — 87636 SARSCOV2 & INF A&B AMP PRB: CPT | Performed by: PHYSICIAN ASSISTANT

## 2023-12-27 PROCEDURE — 99213 OFFICE O/P EST LOW 20 MIN: CPT | Performed by: PHYSICIAN ASSISTANT

## 2023-12-27 RX ORDER — OSELTAMIVIR PHOSPHATE 75 MG/1
75 CAPSULE ORAL 2 TIMES DAILY
Qty: 10 CAPSULE | Refills: 0 | Status: SHIPPED | OUTPATIENT
Start: 2023-12-27 | End: 2024-01-01

## 2023-12-27 NOTE — PATIENT INSTRUCTIONS
Flulike symptoms  Tamiflu as directed  If test comes back negative for influenza you may stop Tamiflu  COVID/flu test sent  Follow up with PCP in 3-5 days.  Proceed to  ER if symptoms worsen.

## 2023-12-27 NOTE — PROGRESS NOTES
[facility]        NAME: Ahsan Ba is a 48 y.o. male  : 1975    MRN: [id number]  DATE: 2023  TIME: 6:53 PM    Assessment and Plan   Flu-like symptoms [R68.89]  1. Flu-like symptoms  oseltamivir (TAMIFLU) 75 mg capsule            Patient Instructions     Flulike symptoms  Tamiflu as directed  If test comes back negative for influenza you may stop Tamiflu  COVID/flu test sent  Follow up with PCP in 3-5 days.  Proceed to  ER if symptoms worsen.    Chief Complaint     Chief Complaint   Patient presents with    Cold Like Symptoms     Cough, body aches , runny nose started yesterday          History of Present Illness       48-year-old male who presents complaining of cough, congestion, runny nose, body aches, chills x 48 hours.  Patient states that he was exposed to someone with influenza.  Denies chest pain, shortness of breath.        Review of Systems   Review of Systems   Constitutional:  Positive for chills and fever.   HENT:  Positive for congestion, postnasal drip, rhinorrhea and sore throat. Negative for dental problem, drooling, ear pain, sinus pain, trouble swallowing and voice change.    Eyes: Negative.    Respiratory:  Positive for cough. Negative for apnea, choking, chest tightness, shortness of breath, wheezing and stridor.    Cardiovascular: Negative.  Negative for chest pain.         Current Medications       Current Outpatient Medications:     ALPRAZolam (XANAX) 0.25 mg tablet, Take 1 tablet (0.25 mg total) by mouth 3 (three) times a day as needed for anxiety, Disp: 30 tablet, Rfl: 0    fexofenadine-pseudoephedrine (ALLEGRA-D)  MG per tablet, Take 1 tablet by mouth daily, Disp: , Rfl:     fluticasone (FLONASE) 50 mcg/act nasal spray, 1 spray into each nostril daily, Disp: , Rfl:     losartan (COZAAR) 50 mg tablet, take 1 tablet by mouth once daily, Disp: 90 tablet, Rfl: 1    ondansetron (ZOFRAN-ODT) 4 mg disintegrating tablet, Take 1 tablet (4 mg total) by mouth  every 6 (six) hours as needed for nausea or vomiting, Disp: 10 tablet, Rfl: 0    oseltamivir (TAMIFLU) 75 mg capsule, Take 1 capsule (75 mg total) by mouth 2 (two) times a day for 5 days, Disp: 10 capsule, Rfl: 0    pantoprazole (PROTONIX) 40 mg tablet, take 1 tablet by mouth once daily, Disp: 90 tablet, Rfl: 1    nystatin (MYCOSTATIN) powder, Apply topically 2 (two) times a day (Patient not taking: Reported on 12/27/2023), Disp: 15 g, Rfl: 0    Current Allergies     Allergies as of 12/27/2023 - Reviewed 12/27/2023   Allergen Reaction Noted    Levaquin [levofloxacin] GI Intolerance and Nausea Only 02/09/2015    Rosuvastatin Myalgia 06/29/2016            The following portions of the patient's history were reviewed and updated as appropriate: allergies, current medications, past family history, past medical history, past social history, past surgical history and problem list.     Past Medical History:   Diagnosis Date    Allergic     Anxiety     Back pain     GERD (gastroesophageal reflux disease)     Hypertension        Past Surgical History:   Procedure Laterality Date    APPENDECTOMY      CHOLECYSTECTOMY      HERNIA REPAIR      inguinal/umbilical - last assessed 6/29/16    CT EXC B9 LESION MRGN XCP SK TG T/A/L 1.1-2.0 CM Right 1/20/2016    Procedure: REVISION SCAR ABDOMINAL Exploration of painful scar on right upper abdomen;  Surgeon: Arturo Waters MD;  Location: [facility];  Service: General    CT REPAIR FIRST ABDOMINAL WALL HERNIA N/A 1/20/2016    Procedure: REPAIR HERNIA VENTRAL (SMALL EPIGASTRIC HERNIA);  Surgeon: Arturo Waters MD;  Location: [facility];  Service: General       Family History   Problem Relation Age of Onset    Cancer Mother         lung    Hypertension Mother     Arthritis Mother     Hypertension Father     Hiatal hernia Father     Arthritis Father     Anxiety disorder Father     Cancer Maternal Uncle          Medications have been verified.        Objective   /69   Pulse 70   Temp  "98.9 °F (37.2 °C)   Resp 19   Ht 5' 10\" (1.778 m)   Wt 93 kg (205 lb)   SpO2 94%   BMI 29.41 kg/m²        Physical Exam     Physical Exam  Constitutional:       General: He is not in acute distress.     Appearance: Normal appearance. He is well-developed. He is not diaphoretic.   HENT:      Head: Normocephalic and atraumatic.      Right Ear: Hearing, tympanic membrane, ear canal and external ear normal.      Left Ear: Hearing, tympanic membrane, ear canal and external ear normal.      Nose: Rhinorrhea present.      Right Sinus: No maxillary sinus tenderness or frontal sinus tenderness.      Left Sinus: No maxillary sinus tenderness or frontal sinus tenderness.      Mouth/Throat:      Pharynx: Uvula midline.   Cardiovascular:      Rate and Rhythm: Normal rate and regular rhythm.      Heart sounds: Normal heart sounds.   Pulmonary:      Effort: Pulmonary effort is normal. No respiratory distress.      Breath sounds: Normal breath sounds. No stridor. No wheezing, rhonchi or rales.   Chest:      Chest wall: No tenderness.   Musculoskeletal:      Cervical back: Normal range of motion and neck supple.   Lymphadenopathy:      Cervical: No cervical adenopathy.   Neurological:      Mental Status: He is alert.                   "

## 2023-12-27 NOTE — TELEPHONE ENCOUNTER
"Patient with dry cough that started yesterday. He has a hx of bronchitis, and thinks it is developing again. Patient denies fever, denies trouble breathing. Patient is having chest tightness due to the cough.  Office is full, advised to go to urgent care at this time. Wife states they would like to hear from PCP. Please follow up.         Reason for Disposition   MILD difficulty breathing (e.g., minimal/no SOB at rest, SOB with walking, pulse <100) and still present when not coughing    Answer Assessment - Initial Assessment Questions  1. ONSET: \"When did the cough begin?\"       Yesterday    2. SEVERITY: \"How bad is the cough today?\"       Dry cough    3. SPUTUM: \"Describe the color of your sputum\" (none, dry cough; clear, white, yellow, green)      Denies    4. HEMOPTYSIS: \"Are you coughing up any blood?\" If so ask: \"How much?\" (flecks, streaks, tablespoons, etc.)      Denies    5. DIFFICULTY BREATHING: \"Are you having difficulty breathing?\" If Yes, ask: \"How bad is it?\" (e.g., mild, moderate, severe)     - MODERATE: SOB at rest, SOB with minimal exertion and prefers to sit, cannot lie down flat, speaks in phrases, mild retractions, audible wheezing, pulse 100-120.     6. FEVER: \"Do you have a fever?\" If Yes, ask: \"What is your temperature, how was it measured, and when did it start?\"      Denies    7. CARDIAC HISTORY: \"Do you have any history of heart disease?\" (e.g., heart attack, congestive heart failure)       HTN    8. LUNG HISTORY: \"Do you have any history of lung disease?\"  (e.g., pulmonary embolus, asthma, emphysema)      Denies    9. PE RISK FACTORS: \"Do you have a history of blood clots?\" (or: recent major surgery, recent prolonged travel, bedridden)        10. OTHER SYMPTOMS: \"Do you have any other symptoms?\" (e.g., runny nose, wheezing, chest pain)        Chest tightness    11. PREGNANCY: \"Is there any chance you are pregnant?\" \"When was your last menstrual period?\"        N/A    12. TRAVEL: \"Have you " "traveled out of the country in the last month?\" (e.g., travel history, exposures)  Denies    Protocols used: Cough-ADULT-[place]    "

## 2023-12-27 NOTE — TELEPHONE ENCOUNTER
I spoke with the patients wife, Katelynn, and she said that they do have an appointment set up at the [facility] for 6pm tonight.

## 2023-12-29 ENCOUNTER — OFFICE VISIT (OUTPATIENT)
Dept: FAMILY MEDICINE CLINIC | Facility: MEDICAL CENTER | Age: 48
End: 2023-12-29
Payer: COMMERCIAL

## 2023-12-29 VITALS
RESPIRATION RATE: 18 BRPM | WEIGHT: 205 LBS | DIASTOLIC BLOOD PRESSURE: 78 MMHG | OXYGEN SATURATION: 96 % | SYSTOLIC BLOOD PRESSURE: 118 MMHG | HEIGHT: 70 IN | HEART RATE: 64 BPM | BODY MASS INDEX: 29.35 KG/M2 | TEMPERATURE: 97.4 F

## 2023-12-29 DIAGNOSIS — R09.81 SINUS CONGESTION: ICD-10-CM

## 2023-12-29 DIAGNOSIS — J40 BRONCHITIS: Primary | ICD-10-CM

## 2023-12-29 PROCEDURE — 99213 OFFICE O/P EST LOW 20 MIN: CPT | Performed by: STUDENT IN AN ORGANIZED HEALTH CARE EDUCATION/TRAINING PROGRAM

## 2023-12-29 RX ORDER — IPRATROPIUM BROMIDE 21 UG/1
2 SPRAY, METERED NASAL EVERY 12 HOURS
Qty: 30 ML | Refills: 0 | Status: SHIPPED | OUTPATIENT
Start: 2023-12-29

## 2023-12-29 RX ORDER — BENZONATATE 200 MG/1
200 CAPSULE ORAL 3 TIMES DAILY PRN
Qty: 20 CAPSULE | Refills: 0 | Status: SHIPPED | OUTPATIENT
Start: 2023-12-29

## 2023-12-29 RX ORDER — ALBUTEROL SULFATE 90 UG/1
2 AEROSOL, METERED RESPIRATORY (INHALATION) EVERY 6 HOURS PRN
Qty: 6.7 G | Refills: 0 | Status: SHIPPED | OUTPATIENT
Start: 2023-12-29

## 2023-12-29 NOTE — PROGRESS NOTES
[facility] - Clinic Note  Porshaloly Neves, DO, 23     Ahsan Ba MRN: [id number] : 1975 Age: 48 y.o.     Assessment/Plan     1. Bronchitis    -Advised about rest and supportive care measures  -Start albuterol inhaler as needed and antitussive per orders  - albuterol (Proventil HFA) 90 mcg/act inhaler; Inhale 2 puffs every 6 (six) hours as needed for wheezing  Dispense: 6.7 g; Refill: 0  - benzonatate (TESSALON) 200 MG capsule; Take 1 capsule (200 mg total) by mouth 3 (three) times a day as needed for cough  Dispense: 20 capsule; Refill: 0  -Call if any new or worsening symptoms or if symptoms not improving    2. Sinus congestion    - ipratropium (ATROVENT) 0.03 % nasal spray; 2 sprays into each nostril every 12 (twelve) hours  Dispense: 30 mL; Refill: 0    Ahsan Ba acknowledged understanding of treatment plan, all questions answered.    Subjective      Ahsan Ba is a 48 y.o. male who presents for urgent care follow-up.  Patient was seen at urgent care on 2023.  At that time complaining of cough, congestion, rhinorrhea, myalgias, chills.  Patient states he was running a fever then as well.  Patient states symptoms started Tuesday night after [place].  He was exposed to family member who tested positive for influenza.  Patient's COVID-19 and influenza testing returned negative.  He has discontinued Tamiflu.  Since yesterday, patient states he has not had a fever.  Today, patient endorses postnasal drip bilateral ear fullness, chest congestion, cough and malaise.  Patient states at this time cough is nonproductive although he tries to bring up phlegm.  Patient states he has been taking flonase, DayQuil, NyQuil and Allegra-D without much relief.    The following portions of the patient's history were reviewed and updated as appropriate: allergies, current medications, past family history, past medical history, past social history, past surgical history and problem  list.     Past Medical History:   Diagnosis Date    Allergic     Anxiety     Back pain     GERD (gastroesophageal reflux disease)     Hypertension        Allergies   Allergen Reactions    Levaquin [Levofloxacin] GI Intolerance and Nausea Only    Rosuvastatin Myalgia       Past Surgical History:   Procedure Laterality Date    APPENDECTOMY      CHOLECYSTECTOMY      HERNIA REPAIR      inguinal/umbilical - last assessed 6/29/16    RI EXC B9 LESION MRGN XCP SK TG T/A/L 1.1-2.0 CM Right 1/20/2016    Procedure: REVISION SCAR ABDOMINAL Exploration of painful scar on right upper abdomen;  Surgeon: Arturo Waters MD;  Location: [facility];  Service: General    RI REPAIR FIRST ABDOMINAL WALL HERNIA N/A 1/20/2016    Procedure: REPAIR HERNIA VENTRAL (SMALL EPIGASTRIC HERNIA);  Surgeon: Arturo Waters MD;  Location: [facility];  Service: General       Family History   Problem Relation Age of Onset    Cancer Mother         lung    Hypertension Mother     Arthritis Mother     Hypertension Father     Hiatal hernia Father     Arthritis Father     Anxiety disorder Father     Cancer Maternal Uncle        Social History     Socioeconomic History    Marital status: /Civil Union     Spouse name: None    Number of children: None    Years of education: None    Highest education level: None   Occupational History    None   Tobacco Use    Smoking status: Never    Smokeless tobacco: Never   Vaping Use    Vaping status: Never Used   Substance and Sexual Activity    Alcohol use: No    Drug use: No    Sexual activity: None   Other Topics Concern    None   Social History Narrative    None     Social Determinants of Health     Financial Resource Strain: Not on file   Food Insecurity: Not on file   Transportation Needs: Not on file   Physical Activity: Not on file   Stress: Not on file   Social Connections: Not on file   Intimate Partner Violence: Not on file   Housing Stability: Not on file       Current Outpatient Medications   Medication  "Sig Dispense Refill    ALPRAZolam (XANAX) 0.25 mg tablet Take 1 tablet (0.25 mg total) by mouth 3 (three) times a day as needed for anxiety 30 tablet 0    fexofenadine-pseudoephedrine (ALLEGRA-D)  MG per tablet Take 1 tablet by mouth daily      fluticasone (FLONASE) 50 mcg/act nasal spray 1 spray into each nostril daily      losartan (COZAAR) 50 mg tablet take 1 tablet by mouth once daily 90 tablet 1    ondansetron (ZOFRAN-ODT) 4 mg disintegrating tablet Take 1 tablet (4 mg total) by mouth every 6 (six) hours as needed for nausea or vomiting 10 tablet 0    pantoprazole (PROTONIX) 40 mg tablet take 1 tablet by mouth once daily 90 tablet 1    nystatin (MYCOSTATIN) powder Apply topically 2 (two) times a day (Patient not taking: Reported on 12/27/2023) 15 g 0    oseltamivir (TAMIFLU) 75 mg capsule Take 1 capsule (75 mg total) by mouth 2 (two) times a day for 5 days (Patient not taking: Reported on 12/29/2023) 10 capsule 0     No current facility-administered medications for this visit.       Review of Systems     As noted in HPI    Objective      /78 (BP Location: Left arm, Patient Position: Sitting, Cuff Size: Adult)   Pulse 64   Temp (!) 97.4 °F (36.3 °C)   Resp 18   Ht 5' 10\" (1.778 m)   Wt 93 kg (205 lb)   SpO2 96%   BMI 29.41 kg/m²     Physical Exam  Vitals reviewed.   Constitutional:       General: He is not in acute distress.     Appearance: He is ill-appearing. He is not toxic-appearing.   HENT:      Head: Normocephalic and atraumatic.      Right Ear: No drainage, swelling or tenderness. A middle ear effusion is present. Tympanic membrane is not injected, erythematous or bulging.      Left Ear: No drainage, swelling or tenderness. A middle ear effusion is present. Tympanic membrane is not injected, erythematous or bulging.      Nose: Congestion present.      Comments: No marked tenderness upon palpation of bilateral frontal and maxillary sinuses     Mouth/Throat:      Mouth: Mucous membranes " "are moist.      Pharynx: Oropharynx is clear. No oropharyngeal exudate or posterior oropharyngeal erythema.   Eyes:      General:         Right eye: No discharge.         Left eye: No discharge.      Extraocular Movements: Extraocular movements intact.      Conjunctiva/sclera: Conjunctivae normal.      Pupils: Pupils are equal, round, and reactive to light.   Cardiovascular:      Rate and Rhythm: Normal rate and regular rhythm.      Pulses: Normal pulses.      Heart sounds: Normal heart sounds.   Pulmonary:      Effort: Pulmonary effort is normal. No respiratory distress.      Breath sounds: Normal breath sounds. No wheezing or rales.   Musculoskeletal:      Cervical back: Neck supple. No rigidity.   Lymphadenopathy:      Cervical: No cervical adenopathy.   Skin:     General: Skin is warm and dry.   Neurological:      Mental Status: He is alert and oriented to person, place, and time.   Psychiatric:         Mood and Affect: Mood normal.         Behavior: Behavior normal.         Thought Content: Thought content normal.             Some portions of this record may have been generated with voice recognition software. There may be translation, syntax, or grammatical errors. Occasional wrong word or \"sound-a-like\" substitutions may have occurred due to the inherent limitations of the voice recognition software. Read the chart carefully and recognize, using context, where substations may have occurred. If you have any questions, please contact the dictating provider for clarification or correction, as needed.  "

## 2024-01-03 ENCOUNTER — OFFICE VISIT (OUTPATIENT)
Dept: FAMILY MEDICINE CLINIC | Facility: MEDICAL CENTER | Age: 49
End: 2024-01-03
Payer: COMMERCIAL

## 2024-01-03 VITALS
BODY MASS INDEX: 29.12 KG/M2 | TEMPERATURE: 98.2 F | OXYGEN SATURATION: 98 % | SYSTOLIC BLOOD PRESSURE: 116 MMHG | DIASTOLIC BLOOD PRESSURE: 72 MMHG | WEIGHT: 203.4 LBS | RESPIRATION RATE: 18 BRPM | HEIGHT: 70 IN | HEART RATE: 78 BPM

## 2024-01-03 DIAGNOSIS — F32.1 CURRENT MODERATE EPISODE OF MAJOR DEPRESSIVE DISORDER WITHOUT PRIOR EPISODE (HCC): ICD-10-CM

## 2024-01-03 DIAGNOSIS — N52.9 ERECTILE DYSFUNCTION, UNSPECIFIED ERECTILE DYSFUNCTION TYPE: ICD-10-CM

## 2024-01-03 DIAGNOSIS — F41.9 ANXIETY: Primary | ICD-10-CM

## 2024-01-03 PROCEDURE — 99214 OFFICE O/P EST MOD 30 MIN: CPT | Performed by: STUDENT IN AN ORGANIZED HEALTH CARE EDUCATION/TRAINING PROGRAM

## 2024-01-03 RX ORDER — SERTRALINE HYDROCHLORIDE 25 MG/1
25 TABLET, FILM COATED ORAL DAILY
Qty: 90 TABLET | Refills: 0 | Status: SHIPPED | OUTPATIENT
Start: 2024-01-03 | End: 2024-07-01

## 2024-01-03 NOTE — PROGRESS NOTES
[facility] - Clinic Note  Chasity Neves DO, 24     Ahsan Ba MRN: [id number] : 1975 Age: 48 y.o.     Assessment/Plan     1. Anxiety    - Discussed common adverse side effects SSRIs sexual dysfunction, nausea, GI upset, dizziness, insomnia, headache, weight gain. Adverse effects typically resolve within the first week. Abrupt discontinuation may cause withdrawal symptoms ( i.e. dizziness, nausea, headache, paresthesia)  -Patient agreeable to starting SSRI  -Start Zoloft 25 mg p.o. daily  - sertraline (ZOLOFT) 25 mg tablet; Take 1 tablet (25 mg total) by mouth daily  Dispense: 90 tablet; Refill: 0  -Resources provided for counseling services, patient agreeable  -Return to office in 4 to 6 weeks for medication monitoring    GARDENIA-7 Flowsheet Screening      Flowsheet Row Most Recent Value   Over the last 2 weeks, how often have you been bothered by any of the following problems?    Feeling nervous, anxious, or on edge 3   Not being able to stop or control worrying 3   Worrying too much about different things 3   Trouble relaxing 3   Being so restless that it is hard to sit still 3   Becoming easily annoyed or irritable 3   Feeling afraid as if something awful might happen 3   GARDENIA-7 Total Score 21          2. Current moderate episode of major depressive disorder without prior episode (HCC)    - Discussed common adverse side effects SSRIs sexual dysfunction, nausea, GI upset, dizziness, insomnia, headache, weight gain. Adverse effects typically resolve within the first week. Abrupt discontinuation may cause withdrawal symptoms ( i.e. dizziness, nausea, headache, paresthesia)  -Patient agreeable to starting SSRI  -Start Zoloft 25 mg p.o. daily  - sertraline (ZOLOFT) 25 mg tablet; Take 1 tablet (25 mg total) by mouth daily  Dispense: 90 tablet; Refill: 0  -Resources provided for counseling services, patient agreeable  -Return to office in 4 to 6 weeks for medication monitoring  - sertraline  (ZOLOFT) 25 mg tablet; Take 1 tablet (25 mg total) by mouth daily  Dispense: 90 tablet; Refill: 0  PHQ-9 Depression Screening    Little interest or pleasure in doing things: 2 - more than half the days  Feeling down, depressed, or hopeless: 2 - more than half the days  Trouble falling or staying asleep, or sleeping too much: 3 - nearly every day  Feeling tired or having little energy: 3 - nearly every day  Poor appetite or overeatin - several days  Feeling bad about yourself - or that you are a failure or have let yourself or your family down: 3 - nearly every day  Trouble concentrating on things, such as reading the newspaper or watching television: 1 - several days  Moving or speaking so slowly that other people could have noticed. Or the opposite - being so fidgety or restless that you have been moving around a lot more than usual: 1 - several days  Thoughts that you would be better off dead, or of hurting yourself in some way: 0 - not at all  PHQ-9 Score: 16  Score Interpretation: Moderately severe depression       3. Erectile dysfunction, unspecified erectile dysfunction type    -Suspect psychogenic component, treat anxiety depression as noted  -Hypertension well-managed  -Thyroid function testing 2023 normal  -Patient does not smoke  -Follow-up lipid panel and A1c    Ahsan Ba acknowledged understanding of treatment plan, all questions answered.    Subjective      Ahsan Ba is a 48 y.o. male who presents to discuss anxiety and depression.  Patient presents with his wife who also contributes to history.  Patient states that anxiety and depression seem to have worsened over this past summer.  Patient's recent health concerns with diverticulitis also has contributed to anxiety and depression.  Patient mentions he has decreased libido.  He also mentions his sleep has been disrupted.  He has good social support through his wife.  Nuys any thoughts to harm himself.    The following portions of  the patient's history were reviewed and updated as appropriate: allergies, current medications, past family history, past medical history, past social history, past surgical history and problem list.     Past Medical History:   Diagnosis Date    Allergic     Anxiety     Back pain     GERD (gastroesophageal reflux disease)     Hypertension        Allergies   Allergen Reactions    Levaquin [Levofloxacin] GI Intolerance and Nausea Only    Rosuvastatin Myalgia       Past Surgical History:   Procedure Laterality Date    APPENDECTOMY      CHOLECYSTECTOMY      HERNIA REPAIR      inguinal/umbilical - last assessed 6/29/16    OK EXC B9 LESION MRGN XCP SK TG T/A/L 1.1-2.0 CM Right 1/20/2016    Procedure: REVISION SCAR ABDOMINAL Exploration of painful scar on right upper abdomen;  Surgeon: Arturo Waters MD;  Location: [facility];  Service: General    OK REPAIR FIRST ABDOMINAL WALL HERNIA N/A 1/20/2016    Procedure: REPAIR HERNIA VENTRAL (SMALL EPIGASTRIC HERNIA);  Surgeon: Arturo Waters MD;  Location: [facility];  Service: General       Family History   Problem Relation Age of Onset    Cancer Mother         lung    Hypertension Mother     Arthritis Mother     Hypertension Father     Hiatal hernia Father     Arthritis Father     Anxiety disorder Father     Cancer Maternal Uncle        Social History     Socioeconomic History    Marital status: /Civil Union     Spouse name: None    Number of children: None    Years of education: None    Highest education level: None   Occupational History    None   Tobacco Use    Smoking status: Never    Smokeless tobacco: Never   Vaping Use    Vaping status: Never Used   Substance and Sexual Activity    Alcohol use: No    Drug use: No    Sexual activity: None   Other Topics Concern    None   Social History Narrative    None     Social Determinants of Health     Financial Resource Strain: Not on file   Food Insecurity: Not on file   Transportation Needs: Not on file   Physical Activity:  "Not on file   Stress: Not on file   Social Connections: Not on file   Intimate Partner Violence: Not on file   Housing Stability: Not on file       Current Outpatient Medications   Medication Sig Dispense Refill    albuterol (Proventil HFA) 90 mcg/act inhaler Inhale 2 puffs every 6 (six) hours as needed for wheezing 6.7 g 0    ALPRAZolam (XANAX) 0.25 mg tablet Take 1 tablet (0.25 mg total) by mouth 3 (three) times a day as needed for anxiety 30 tablet 0    benzonatate (TESSALON) 200 MG capsule Take 1 capsule (200 mg total) by mouth 3 (three) times a day as needed for cough 20 capsule 0    fexofenadine-pseudoephedrine (ALLEGRA-D)  MG per tablet Take 1 tablet by mouth daily      fluticasone (FLONASE) 50 mcg/act nasal spray 1 spray into each nostril daily      ipratropium (ATROVENT) 0.03 % nasal spray 2 sprays into each nostril every 12 (twelve) hours 30 mL 0    losartan (COZAAR) 50 mg tablet take 1 tablet by mouth once daily 90 tablet 1    ondansetron (ZOFRAN-ODT) 4 mg disintegrating tablet Take 1 tablet (4 mg total) by mouth every 6 (six) hours as needed for nausea or vomiting 10 tablet 0    pantoprazole (PROTONIX) 40 mg tablet take 1 tablet by mouth once daily 90 tablet 1    sertraline (ZOLOFT) 25 mg tablet Take 1 tablet (25 mg total) by mouth daily 90 tablet 0    nystatin (MYCOSTATIN) powder Apply topically 2 (two) times a day (Patient not taking: Reported on 12/27/2023) 15 g 0     No current facility-administered medications for this visit.       Review of Systems     As noted in HPI    Objective      /72 (BP Location: Left arm, Patient Position: Sitting, Cuff Size: Adult)   Pulse 78   Temp 98.2 °F (36.8 °C)   Resp 18   Ht 5' 10\" (1.778 m)   Wt 92.3 kg (203 lb 6.4 oz)   SpO2 98%   BMI 29.18 kg/m²     Physical Exam  Vitals reviewed.   Constitutional:       General: He is not in acute distress.  HENT:      Head: Normocephalic and atraumatic.      Mouth/Throat:      Mouth: Mucous membranes are " "moist.      Pharynx: Oropharynx is clear.   Eyes:      Conjunctiva/sclera: Conjunctivae normal.   Cardiovascular:      Rate and Rhythm: Normal rate and regular rhythm.      Pulses: Normal pulses.      Heart sounds: Normal heart sounds.   Pulmonary:      Effort: Pulmonary effort is normal. No respiratory distress.      Breath sounds: Normal breath sounds. No wheezing.   Abdominal:      General: Bowel sounds are normal.      Palpations: Abdomen is soft.      Tenderness: There is no abdominal tenderness. There is no guarding or rebound.   Skin:     General: Skin is warm and dry.   Neurological:      Mental Status: He is alert and oriented to person, place, and time.   Psychiatric:         Attention and Perception: Attention and perception normal.         Mood and Affect: Mood is anxious. Affect is tearful.         Speech: Speech normal.         Behavior: Behavior normal. Behavior is cooperative.         Thought Content: Thought content normal. Thought content does not include suicidal ideation. Thought content does not include suicidal plan.         Cognition and Memory: Cognition and memory normal.         Judgment: Judgment normal.             Some portions of this record may have been generated with voice recognition software. There may be translation, syntax, or grammatical errors. Occasional wrong word or \"sound-a-like\" substitutions may have occurred due to the inherent limitations of the voice recognition software. Read the chart carefully and recognize, using context, where substations may have occurred. If you have any questions, please contact the dictating provider for clarification or correction, as needed.  "

## 2024-01-03 NOTE — PATIENT INSTRUCTIONS
[facility] [phone]    [facility] (therapy) [phone]    [facility] [phone]    . [facility] [phone]

## 2024-01-09 ENCOUNTER — OFFICE VISIT (OUTPATIENT)
Dept: GASTROENTEROLOGY | Facility: AMBULARY SURGERY CENTER | Age: 49
End: 2024-01-09
Payer: COMMERCIAL

## 2024-01-09 VITALS
DIASTOLIC BLOOD PRESSURE: 88 MMHG | HEART RATE: 79 BPM | BODY MASS INDEX: 28.98 KG/M2 | SYSTOLIC BLOOD PRESSURE: 126 MMHG | WEIGHT: 202.4 LBS | HEIGHT: 70 IN | OXYGEN SATURATION: 98 %

## 2024-01-09 DIAGNOSIS — K21.9 GASTROESOPHAGEAL REFLUX DISEASE, UNSPECIFIED WHETHER ESOPHAGITIS PRESENT: Primary | ICD-10-CM

## 2024-01-09 DIAGNOSIS — R10.11 RIGHT UPPER QUADRANT ABDOMINAL PAIN: ICD-10-CM

## 2024-01-09 DIAGNOSIS — K57.30 DIVERTICULAR DISEASE OF COLON: ICD-10-CM

## 2024-01-09 DIAGNOSIS — Z12.11 SCREENING FOR COLON CANCER: ICD-10-CM

## 2024-01-09 DIAGNOSIS — K76.0 NAFLD (NONALCOHOLIC FATTY LIVER DISEASE): ICD-10-CM

## 2024-01-09 PROBLEM — E66.09 CLASS 1 OBESITY DUE TO EXCESS CALORIES WITH SERIOUS COMORBIDITY AND BODY MASS INDEX (BMI) OF 31.0 TO 31.9 IN ADULT: Status: RESOLVED | Noted: 2021-12-09 | Resolved: 2024-01-09

## 2024-01-09 PROBLEM — E66.811 CLASS 1 OBESITY DUE TO EXCESS CALORIES WITH SERIOUS COMORBIDITY AND BODY MASS INDEX (BMI) OF 31.0 TO 31.9 IN ADULT: Status: RESOLVED | Noted: 2021-12-09 | Resolved: 2024-01-09

## 2024-01-09 PROCEDURE — 99244 OFF/OP CNSLTJ NEW/EST MOD 40: CPT | Performed by: INTERNAL MEDICINE

## 2024-01-09 RX ORDER — ONDANSETRON 4 MG/1
4 TABLET, ORALLY DISINTEGRATING ORAL EVERY 6 HOURS PRN
Qty: 10 TABLET | Refills: 0 | Status: SHIPPED | OUTPATIENT
Start: 2024-01-09

## 2024-01-09 RX ORDER — DICYCLOMINE HCL 20 MG
20 TABLET ORAL
Qty: 120 TABLET | Refills: 5 | Status: SHIPPED | OUTPATIENT
Start: 2024-01-09

## 2024-01-09 NOTE — H&P (VIEW-ONLY)
[facility] - Outpatient Consultation  Ahsan Ba 48 y.o. male MRN: [id number]  Encounter: [id number]      PCP: Chasity Neves DO  Referring: Chasity Neves DO  [address] [facility]  [address] [place] [id number]      ASSESSMENT AND PLAN:      1. Right upper quadrant abdominal pain  Chronic symptoms > 12 months  Previous evaluation has been unremarkable, but performed > 10 years ago  - EGD; Future, evaluate for peptic ulcer disease, erosive gastritis, less likely malignancy given his weight loss  - dicyclomine (BENTYL) 20 mg tablet; Take 1 tablet (20 mg total) by mouth 4 (four) times a day (before meals and at bedtime)  Dispense: 120 tablet; Refill: 5- start therapy empirically AC Meals with once daily and slowly increase  - ondansetron (ZOFRAN-ODT) 4 mg disintegrating tablet; Take 1 tablet (4 mg total) by mouth every 6 (six) hours as needed for nausea or vomiting  Dispense: 10 tablet; Refill: 0    2. Gastroesophageal reflux disease, unspecified whether esophagitis present  Chronic disease, > 12 months  - exacerbated during episode of diverticulitis, currently stable  - continue protonix  - EGD; Future, evaluate for precancerous conditions including murphy's esophagus or other    3. Diverticular disease of colon  Counseled, educated regarding natural course and pathophysiology associated with acute uncomplicated diverticulitis, which is currently resolved  - recommend low fiber/low residue, dairy free diet for 6-8 weeks after acute episode with slow readdition of food groups as tolerated  - recommend daily use of psyllium fiber    4. Screening for colon cancer  - Hgb 15.3 from Dec 2023 labs- normal  - Colonoscopy; Future    5. NAFLD (nonalcoholic fatty liver disease)  - incidentally seen on RUQ ultrasound (performed for evaluation of his abdominal pain) in Dec 2023  - AST, ALT reviewed Dec 2023 labs within normal limits  - no evidence of thrombocytopenia  -No evidence of liver  dysfunction, will continue to monitor with repeat CMP and ultrasound every 12 months  - will discuss at next visit            ______________________________________________________________________    CC:  Chief Complaint   Patient presents with    Advice Only       HPI:      Patient is a 48-year-old male referred for abdominal pain. He has GERD on protonix, chronic recurrent sinusitis ongoing for more than 12 months, HTN, HLD, BMI 29.  He is present with his wife at today's visit, who contributes to history.  Last month he experienced an acute onset of change in bowel habits, including diarrhea that was occurring more than 5 times per day, with symptoms worse after eating, and exacerbation of his right upper quadrant abdominal pain.  He was seen in the ER, and diagnosed with acute diverticulitis, he was treated with Zofran and antibiotics.  His symptoms persist for the last greater than 4 weeks.  He describes nausea after eating, persistent right upper quadrant abdominal pain, and gas and belching that is exacerbated by intake of green vegetables including salad, ice cream.  He describes the right upper quadrant abdominal pain is chronic going on for more than last 12 months, and a dull and constant nature.  He has been treated with cholecystectomy, appendectomy and hernia repair without improvement in his symptoms.  He is concerned about scar adhesions.  He does have daily bowel movements but did note a change in stool caliber, with thinner stools occurring prior to his onset of acute diverticulitis.  He has unintentionally lost 15 pounds in the last 4 weeks.  Denies any rectal bleeding.  His last EGD/colonoscopy were performed more than 10 years ago.    CT abdomen/pelvis Dec 2023-mild inflammatory stranding consistent with acute uncomplicated sigmoid diverticulitis  I personally reviewed the images on PACS.  RUQ ultrasound Dec 2023- hepatic steatosis      Abdominal Pain  This is a new problem. The current episode  "started more than 1 month ago. The onset quality is gradual. The problem occurs daily. The most recent episode lasted 1 months. The problem has been waxing and waning. The pain is located in the RUQ. The pain is at a severity of 7/10. The quality of the pain is cramping. The abdominal pain radiates to the chest. Associated symptoms include belching, flatus, frequency, headaches, myalgias, nausea and weight loss. Pertinent negatives include no anorexia, arthralgias, constipation, diarrhea, dysuria, fever, hematochezia, hematuria, melena or vomiting. The pain is aggravated by being still and certain positions. The pain is relieved by Activity. Prior diagnostic workup includes CT scan and ultrasound.           Objective     Blood pressure 126/88, pulse 79, height 5' 10\" (1.778 m), weight 91.8 kg (202 lb 6.4 oz), SpO2 98%. Body mass index is 29.04 kg/m².     PHYSICAL EXAM:      General Appearance:   Alert, cooperative, no distress   HEENT:   Normocephalic, atraumatic, anicteric.     Neck:  Supple, symmetrical, trachea midline   Lungs:   Clear to auscultation bilaterally; no rales, rhonchi or wheezing; respirations unlabored    Heart::   Regular rate and rhythm; no murmur, rub, or gallop.   Abdomen:   Soft, +RUQ abdominal pain to palpation, non-distended; normal bowel sounds; no masses, no organomegaly    Genitalia:   Deferred    Rectal:   Deferred    Extremities:  No cyanosis, clubbing or edema    Pulses:  2+ and symmetric    Skin:  No jaundice, rashes, or lesions    Lymph nodes:  No palpable cervical lymphadenopathy        Lab Results:     Lab Results   Component Value Date    WBC 9.67 12/01/2023    HGB 15.3 12/01/2023    HCT 47.8 12/01/2023    MCV 84 12/01/2023     12/01/2023       Lab Results   Component Value Date    K 3.9 12/01/2023     12/01/2023    CO2 26 12/01/2023    BUN 16 12/01/2023    CREATININE 1.17 12/01/2023    GLUF 95 07/14/2023    CALCIUM 8.4 12/01/2023    AST 13 12/01/2023    ALT 18 " "12/01/2023    ALKPHOS 46 12/01/2023    EGFR 73 12/01/2023       No results found for: \"INR\", \"PROTIME\"      Radiology Results:   US right upper quadrant    Result Date: 12/19/2023  Narrative: RIGHT UPPER QUADRANT ULTRASOUND INDICATION:     K76.89: Other specified diseases of liver. COMPARISON: CT abdomen/pelvis 12/1/2023. TECHNIQUE:   Real-time ultrasound of the right upper quadrant was performed with a curvilinear transducer with both volumetric sweeps and still imaging techniques. FINDINGS: PANCREAS: Portions of the pancreas are obscured by bowel gas. Visualized portions of the pancreas are unremarkable. AORTA AND IVC:  Visualized portions are normal for patient age. LIVER: Size:  Within normal range.  The liver measures 17.0 cm in the midclavicular line. Contour:  Surface contour is smooth. Parenchyma: There is moderate diffuse increased echogenicity with smooth echotexture and acoustic beam attenuation.  Most consistent with moderate hepatic steatosis. No liver mass identified. Cyst/hypodensities seen on prior CT are not seen on current study. Limited imaging of the main portal vein shows it to be patent and hepatopetal. BILIARY: Patient has undergone cholecystectomy. No intrahepatic biliary dilatation. CBD measures 4.0 mm. No choledocholithiasis. KIDNEY: Right kidney measures 11.0 x 5.5 x 5.3 cm. Volume 166.0 mL Kidney within normal limits. ASCITES:   None.     Impression: Hepatic steatosis. Cysts/hypodensities seen on prior CT are not seen. Workstation performed: [id number]       Portions of the record may have been created with voice recognition software. Occasional wrong word or \"sound a like\" substitutions may have occurred due to the inherent limitations of voice recognition software. Read the chart carefully and recognize, using context, where substitutions have occurred.        "

## 2024-01-09 NOTE — PATIENT INSTRUCTIONS
- For approximately 6 to 8 weeks after an acute diverticulitis episode, I would recommend a low residue/low fiber diet, and preferably dairy free    For your bowel habits, as we discussed during today's visit,  - I would recommend starting psyllium, fiber supplementation.  This can be done with use of Konsyl, Citrucil, Metamucil or Benefiber fiber, which can be purchased over-the-counter.  I would recommend starting slow with 1 tsp mixed into a large glass of water, once a day, and increasing to goal of 1 tbsp mixed into a large glass of water per day.  - this helps with both diarrhea and constipation, helping to bulk the stool, and should not cause constipation or diarrhea, but treat both  - the only side effect of this can be bloating, if this occurs, cut down on the dose, and increase your water intake or alternatively, consider switching to an alternative as listed above    Scheduled date of EGD/colonoscopy (as of today): Feb 6  Physician performing EGD/colonoscopy: Dr. Tyson  Location of EGD/colonoscopy: [facility]  Desired bowel prep reviewed with patient: Miralax/Dulcolax  Instructions reviewed with patient by: Katelynn LARA  Clearances:  n/a

## 2024-01-09 NOTE — PROGRESS NOTES
[facility] - Outpatient Consultation  Ahsan Ba 48 y.o. male MRN: [id number]  Encounter: [id number]      PCP: Chasity Neves DO  Referring: Chasity Neves DO  [address] [facility]  [address] [place] [id number]      ASSESSMENT AND PLAN:      1. Right upper quadrant abdominal pain  Chronic symptoms > 12 months  Previous evaluation has been unremarkable, but performed > 10 years ago  - EGD; Future, evaluate for peptic ulcer disease, erosive gastritis, less likely malignancy given his weight loss  - dicyclomine (BENTYL) 20 mg tablet; Take 1 tablet (20 mg total) by mouth 4 (four) times a day (before meals and at bedtime)  Dispense: 120 tablet; Refill: 5- start therapy empirically AC Meals with once daily and slowly increase  - ondansetron (ZOFRAN-ODT) 4 mg disintegrating tablet; Take 1 tablet (4 mg total) by mouth every 6 (six) hours as needed for nausea or vomiting  Dispense: 10 tablet; Refill: 0    2. Gastroesophageal reflux disease, unspecified whether esophagitis present  Chronic disease, > 12 months  - exacerbated during episode of diverticulitis, currently stable  - continue protonix  - EGD; Future, evaluate for precancerous conditions including murphy's esophagus or other    3. Diverticular disease of colon  Counseled, educated regarding natural course and pathophysiology associated with acute uncomplicated diverticulitis, which is currently resolved  - recommend low fiber/low residue, dairy free diet for 6-8 weeks after acute episode with slow readdition of food groups as tolerated  - recommend daily use of psyllium fiber    4. Screening for colon cancer  - Hgb 15.3 from Dec 2023 labs- normal  - Colonoscopy; Future    5. NAFLD (nonalcoholic fatty liver disease)  - incidentally seen on RUQ ultrasound (performed for evaluation of his abdominal pain) in Dec 2023  - AST, ALT reviewed Dec 2023 labs within normal limits  - no evidence of thrombocytopenia  -No evidence of liver  dysfunction, will continue to monitor with repeat CMP and ultrasound every 12 months  - will discuss at next visit            ______________________________________________________________________    CC:  Chief Complaint   Patient presents with    Advice Only       HPI:      Patient is a 48-year-old male referred for abdominal pain. He has GERD on protonix, chronic recurrent sinusitis ongoing for more than 12 months, HTN, HLD, BMI 29.  He is present with his wife at today's visit, who contributes to history.  Last month he experienced an acute onset of change in bowel habits, including diarrhea that was occurring more than 5 times per day, with symptoms worse after eating, and exacerbation of his right upper quadrant abdominal pain.  He was seen in the ER, and diagnosed with acute diverticulitis, he was treated with Zofran and antibiotics.  His symptoms persist for the last greater than 4 weeks.  He describes nausea after eating, persistent right upper quadrant abdominal pain, and gas and belching that is exacerbated by intake of green vegetables including salad, ice cream.  He describes the right upper quadrant abdominal pain is chronic going on for more than last 12 months, and a dull and constant nature.  He has been treated with cholecystectomy, appendectomy and hernia repair without improvement in his symptoms.  He is concerned about scar adhesions.  He does have daily bowel movements but did note a change in stool caliber, with thinner stools occurring prior to his onset of acute diverticulitis.  He has unintentionally lost 15 pounds in the last 4 weeks.  Denies any rectal bleeding.  His last EGD/colonoscopy were performed more than 10 years ago.    CT abdomen/pelvis Dec 2023-mild inflammatory stranding consistent with acute uncomplicated sigmoid diverticulitis  I personally reviewed the images on PACS.  RUQ ultrasound Dec 2023- hepatic steatosis      Abdominal Pain  This is a new problem. The current episode  "started more than 1 month ago. The onset quality is gradual. The problem occurs daily. The most recent episode lasted 1 months. The problem has been waxing and waning. The pain is located in the RUQ. The pain is at a severity of 7/10. The quality of the pain is cramping. The abdominal pain radiates to the chest. Associated symptoms include belching, flatus, frequency, headaches, myalgias, nausea and weight loss. Pertinent negatives include no anorexia, arthralgias, constipation, diarrhea, dysuria, fever, hematochezia, hematuria, melena or vomiting. The pain is aggravated by being still and certain positions. The pain is relieved by Activity. Prior diagnostic workup includes CT scan and ultrasound.           Objective     Blood pressure 126/88, pulse 79, height 5' 10\" (1.778 m), weight 91.8 kg (202 lb 6.4 oz), SpO2 98%. Body mass index is 29.04 kg/m².     PHYSICAL EXAM:      General Appearance:   Alert, cooperative, no distress   HEENT:   Normocephalic, atraumatic, anicteric.     Neck:  Supple, symmetrical, trachea midline   Lungs:   Clear to auscultation bilaterally; no rales, rhonchi or wheezing; respirations unlabored    Heart::   Regular rate and rhythm; no murmur, rub, or gallop.   Abdomen:   Soft, +RUQ abdominal pain to palpation, non-distended; normal bowel sounds; no masses, no organomegaly    Genitalia:   Deferred    Rectal:   Deferred    Extremities:  No cyanosis, clubbing or edema    Pulses:  2+ and symmetric    Skin:  No jaundice, rashes, or lesions    Lymph nodes:  No palpable cervical lymphadenopathy        Lab Results:     Lab Results   Component Value Date    WBC 9.67 12/01/2023    HGB 15.3 12/01/2023    HCT 47.8 12/01/2023    MCV 84 12/01/2023     12/01/2023       Lab Results   Component Value Date    K 3.9 12/01/2023     12/01/2023    CO2 26 12/01/2023    BUN 16 12/01/2023    CREATININE 1.17 12/01/2023    GLUF 95 07/14/2023    CALCIUM 8.4 12/01/2023    AST 13 12/01/2023    ALT 18 " "12/01/2023    ALKPHOS 46 12/01/2023    EGFR 73 12/01/2023       No results found for: \"INR\", \"PROTIME\"      Radiology Results:   US right upper quadrant    Result Date: 12/19/2023  Narrative: RIGHT UPPER QUADRANT ULTRASOUND INDICATION:     K76.89: Other specified diseases of liver. COMPARISON: CT abdomen/pelvis 12/1/2023. TECHNIQUE:   Real-time ultrasound of the right upper quadrant was performed with a curvilinear transducer with both volumetric sweeps and still imaging techniques. FINDINGS: PANCREAS: Portions of the pancreas are obscured by bowel gas. Visualized portions of the pancreas are unremarkable. AORTA AND IVC:  Visualized portions are normal for patient age. LIVER: Size:  Within normal range.  The liver measures 17.0 cm in the midclavicular line. Contour:  Surface contour is smooth. Parenchyma: There is moderate diffuse increased echogenicity with smooth echotexture and acoustic beam attenuation.  Most consistent with moderate hepatic steatosis. No liver mass identified. Cyst/hypodensities seen on prior CT are not seen on current study. Limited imaging of the main portal vein shows it to be patent and hepatopetal. BILIARY: Patient has undergone cholecystectomy. No intrahepatic biliary dilatation. CBD measures 4.0 mm. No choledocholithiasis. KIDNEY: Right kidney measures 11.0 x 5.5 x 5.3 cm. Volume 166.0 mL Kidney within normal limits. ASCITES:   None.     Impression: Hepatic steatosis. Cysts/hypodensities seen on prior CT are not seen. Workstation performed: [id number]       Portions of the record may have been created with voice recognition software. Occasional wrong word or \"sound a like\" substitutions may have occurred due to the inherent limitations of voice recognition software. Read the chart carefully and recognize, using context, where substitutions have occurred.        "

## 2024-01-23 ENCOUNTER — ANESTHESIA EVENT (OUTPATIENT)
Dept: ANESTHESIOLOGY | Facility: HOSPITAL | Age: 49
End: 2024-01-23

## 2024-01-23 ENCOUNTER — ANESTHESIA (OUTPATIENT)
Dept: ANESTHESIOLOGY | Facility: HOSPITAL | Age: 49
End: 2024-01-23

## 2024-01-26 ENCOUNTER — NURSE TRIAGE (OUTPATIENT)
Age: 49
End: 2024-01-26

## 2024-01-26 NOTE — TELEPHONE ENCOUNTER
Called and relayed message from provider. Mrs. Ba verbalized understanding. I asked her to call if he wasn't any better over the weekend if his symptoms worsened.

## 2024-01-26 NOTE — TELEPHONE ENCOUNTER
"Last ov 1/9/24 schuyler Box scheduled for 2/6/24, Labs 12/1/23, Imaging us RUQ 12/13/23, CT A/P 12/1/23    I spoke with Mrs. Ba (spouse noted on communication sheet). She states they were asked to call in if needed. She states her  is experiencing RUQ pain without improvement and it seems to be increasing. Describes as gas pains, patient is taking all medications as ordered along with Gas X and TUMS without relief. He feels \"overfilled\" and not eating much currently. He is following the dietary recommendations from visit, keeps hydrated, does not eat late and keeps head of bed elevated. He is belching (loudly) and passing gas, having bowel movements. He does walk daily, not sedentary.  He has not started daily psyllium fiber because they were under the impression at office visit to wait to start that until after colonoscopy, please clarify that.     Please review and advise.     Reason for Disposition   Abdominal pain is a chronic symptom (recurrent or ongoing AND lasting > 4 weeks)     Scheduled for EGD/Colon 2/6/24.    Answer Assessment - Initial Assessment Questions  1. LOCATION: \"Where does it hurt?\"       RUQ  2. RADIATION: \"Does the pain shoot anywhere else?\" (e.g., chest, back)      Same as noted on visit  3. ONSET: \"When did the pain begin?\" (Minutes, hours or days ago)       Ongoing since office visit  4. SUDDEN: \"Gradual or sudden onset?\"      Chronic currently  5. PATTERN \"Does the pain come and go, or is it constant?\"     - If constant: \"Is it getting better, staying the same, or worsening?\"       (Note: Constant means the pain never goes away completely; most serious pain is constant and it progresses)      - If intermittent: \"How long does it last?\" \"Do you have pain now?\"      (Note: Intermittent means the pain goes away completely between bouts)      constant  6. SEVERITY: \"How bad is the pain?\"  (e.g., Scale 1-10; mild, moderate, or severe)     - MILD (1-3): doesn't interfere with " "normal activities, abdomen soft and not tender to touch      - MODERATE (4-7): interferes with normal activities or awakens from sleep, tender to touch      - SEVERE (8-10): excruciating pain, doubled over, unable to do any normal activities        moderate  7. RECURRENT SYMPTOM: \"Have you ever had this type of stomach pain before?\" If Yes, ask: \"When was the last time?\" and \"What happened that time?\"       Yes but worsening  8. CAUSE: \"What do you think is causing the stomach pain?\"      unknown  9. RELIEVING/AGGRAVATING FACTORS: \"What makes it better or worse?\" (e.g., movement, antacids, bowel movement)      Nothing currently  10. OTHER SYMPTOMS: \"Has there been any vomiting, diarrhea, constipation, or urine problems?\"        no    Protocols used: Abdominal Pain - Male-ADULT-[place]    "

## 2024-01-26 NOTE — TELEPHONE ENCOUNTER
Regarding: Abdominal pain  ----- Message from Lisa Sweeney sent at 1/26/2024  9:28 AM EST -----  Pt's wife called stating pt had been prescribed medication to help with abdominal pain and it was helping. Medication is no longer working.

## 2024-01-29 ENCOUNTER — APPOINTMENT (OUTPATIENT)
Age: 49
End: 2024-01-29
Payer: COMMERCIAL

## 2024-01-29 DIAGNOSIS — Z13.220 SCREENING CHOLESTEROL LEVEL: ICD-10-CM

## 2024-01-29 DIAGNOSIS — I10 ESSENTIAL HYPERTENSION: ICD-10-CM

## 2024-01-29 DIAGNOSIS — Z13.1 SCREENING FOR DIABETES MELLITUS (DM): ICD-10-CM

## 2024-01-29 LAB
ANION GAP SERPL CALCULATED.3IONS-SCNC: 6 MMOL/L
BUN SERPL-MCNC: 14 MG/DL (ref 5–25)
CALCIUM SERPL-MCNC: 9 MG/DL (ref 8.4–10.2)
CHLORIDE SERPL-SCNC: 105 MMOL/L (ref 96–108)
CHOLEST SERPL-MCNC: 147 MG/DL
CO2 SERPL-SCNC: 30 MMOL/L (ref 21–32)
CREAT SERPL-MCNC: 1.1 MG/DL (ref 0.6–1.3)
EST. AVERAGE GLUCOSE BLD GHB EST-MCNC: 120 MG/DL
GFR SERPL CREATININE-BSD FRML MDRD: 78 ML/MIN/1.73SQ M
GLUCOSE P FAST SERPL-MCNC: 94 MG/DL (ref 65–99)
HBA1C MFR BLD: 5.8 %
HDLC SERPL-MCNC: 28 MG/DL
LDLC SERPL CALC-MCNC: 73 MG/DL (ref 0–100)
POTASSIUM SERPL-SCNC: 4.3 MMOL/L (ref 3.5–5.3)
SODIUM SERPL-SCNC: 141 MMOL/L (ref 135–147)
TRIGL SERPL-MCNC: 230 MG/DL

## 2024-01-29 PROCEDURE — 80048 BASIC METABOLIC PNL TOTAL CA: CPT

## 2024-01-29 PROCEDURE — 80061 LIPID PANEL: CPT

## 2024-01-29 PROCEDURE — 36415 COLL VENOUS BLD VENIPUNCTURE: CPT

## 2024-01-29 PROCEDURE — 83036 HEMOGLOBIN GLYCOSYLATED A1C: CPT

## 2024-01-30 ENCOUNTER — OFFICE VISIT (OUTPATIENT)
Dept: FAMILY MEDICINE CLINIC | Facility: MEDICAL CENTER | Age: 49
End: 2024-01-30
Payer: COMMERCIAL

## 2024-01-30 VITALS
OXYGEN SATURATION: 98 % | WEIGHT: 200.4 LBS | DIASTOLIC BLOOD PRESSURE: 72 MMHG | SYSTOLIC BLOOD PRESSURE: 118 MMHG | HEART RATE: 81 BPM | HEIGHT: 70 IN | TEMPERATURE: 97.7 F | BODY MASS INDEX: 28.69 KG/M2

## 2024-01-30 DIAGNOSIS — F32.1 CURRENT MODERATE EPISODE OF MAJOR DEPRESSIVE DISORDER WITHOUT PRIOR EPISODE (HCC): ICD-10-CM

## 2024-01-30 DIAGNOSIS — F41.9 ANXIETY: ICD-10-CM

## 2024-01-30 DIAGNOSIS — E78.1 HIGH TRIGLYCERIDES: Primary | ICD-10-CM

## 2024-01-30 DIAGNOSIS — Z71.2 ENCOUNTER TO DISCUSS TEST RESULTS: ICD-10-CM

## 2024-01-30 DIAGNOSIS — I10 ESSENTIAL HYPERTENSION: ICD-10-CM

## 2024-01-30 DIAGNOSIS — R73.03 PREDIABETES: ICD-10-CM

## 2024-01-30 PROCEDURE — 99214 OFFICE O/P EST MOD 30 MIN: CPT | Performed by: STUDENT IN AN ORGANIZED HEALTH CARE EDUCATION/TRAINING PROGRAM

## 2024-01-30 RX ORDER — SERTRALINE HYDROCHLORIDE 25 MG/1
25 TABLET, FILM COATED ORAL DAILY
Qty: 90 TABLET | Refills: 1 | Status: SHIPPED | OUTPATIENT
Start: 2024-01-30 | End: 2024-07-28

## 2024-01-30 NOTE — PROGRESS NOTES
Pending sale to [facility] - Clinic Note  Chasity Neves DO, 24     Ahsan Ba MRN: [id number] : 1975 Age: 49 y.o.     Assessment/Plan     1. Anxiety    -Excellent response with Zoloft, will continue at 25 mg p.o. daily  -Tolerating medication well  - sertraline (ZOLOFT) 25 mg tablet; Take 1 tablet (25 mg total) by mouth daily  Dispense: 90 tablet; Refill: 1  GARDENIA-7 Flowsheet Screening      Flowsheet Row Most Recent Value   Over the last 2 weeks, how often have you been bothered by any of the following problems?    Feeling nervous, anxious, or on edge 1   Not being able to stop or control worrying 1   Worrying too much about different things 1   Trouble relaxing 0   Being so restless that it is hard to sit still 1   Becoming easily annoyed or irritable 1   Feeling afraid as if something awful might happen 0   GARDENIA-7 Total Score 5          2. Current moderate episode of major depressive disorder without prior episode (HCC)    -Excellent response with Zoloft, will continue at 25 mg p.o. daily  -Tolerating medication well  - sertraline (ZOLOFT) 25 mg tablet; Take 1 tablet (25 mg total) by mouth daily  Dispense: 90 tablet; Refill: 1  PHQ-9 Depression Screening    Little interest or pleasure in doing things: 1 - several days  Feeling down, depressed, or hopeless: 0 - not at all  Trouble falling or staying asleep, or sleeping too much: 0 - not at all  Feeling tired or having little energy: 1 - several days  Poor appetite or overeatin - not at all  Feeling bad about yourself - or that you are a failure or have let yourself or your family down: 0 - not at all  Trouble concentrating on things, such as reading the newspaper or watching television: 0 - not at all  Moving or speaking so slowly that other people could have noticed. Or the opposite - being so fidgety or restless that you have been moving around a lot more than usual: 0 - not at all  Thoughts that you would be better off dead, or of hurting  "yourself in some way: 0 - not at all  PHQ-9 Score: 2  Score Interpretation: No or Minimal depression       3. High triglycerides    -Recent lipid panel reviewed, encouraged diet and lifestyle modifications, repeat lipid panel in 6 months  The 10-year ASCVD risk score (Harley BUCHANAN, et al., 2019) is: 3.4%    Values used to calculate the score:      Age: 49 years      Sex: Male      Is Non- : No      Diabetic: No      Tobacco smoker: No      Systolic Blood Pressure: 118 mmHg      Is BP treated: Yes      HDL Cholesterol: 28 mg/dL      Total Cholesterol: 147 mg/dL  - Lipid Panel with Direct LDL reflex; Future    4. Essential hypertension    -Stable with losartan 50 mg p.o. daily  -Recent BMP reviewed  - Basic metabolic panel; Future    5. Encounter to discuss test results    - I have reviewed pertinent labs:  BMP:   Lab Results   Component Value Date    SODIUM 141 01/29/2024    K 4.3 01/29/2024     01/29/2024    CO2 30 01/29/2024    AGAP 6 01/29/2024    BUN 14 01/29/2024    CREATININE 1.10 01/29/2024    GLUC 93 12/01/2023    GLUF 94 01/29/2024    CALCIUM 9.0 01/29/2024    EGFR 78 01/29/2024     Lipid Profile:   Lab Results   Component Value Date    CHOLESTEROL 147 01/29/2024    HDL 28 (L) 01/29/2024    TRIG 230 (H) 01/29/2024    LDLCALC 73 01/29/2024    NONHDLC 157 01/31/2022     Hemoglobin A1C/EST AVG Glucose   Lab Results   Component Value Date    HGBA1C 5.8 (H) 01/29/2024     01/29/2024       6. Prediabetes    -Encourage diet and lifestyle modifications  -Educational material distributed    Ahsan Ba acknowledged understanding of treatment plan, all questions answered.    Subjective      Ahsan Ba is a 49 y.o. male who presents for new medication follow-up.  Patient was started on Zoloft 25 mg p.o. daily for anxiety.  Patient states he feels \" more relaxed and more happy with myself\".  Patient reports he has been taking medication daily at night as it does make him " somewhat drowsy.  Patient overall tolerating medication well.  Patient presents with his wife.  Wife noticed an improvement after about a week of the medication she describes.    The following portions of the patient's history were reviewed and updated as appropriate: allergies, current medications, past family history, past medical history, past social history, past surgical history and problem list.  Past Medical History:   Diagnosis Date    Allergic     Anxiety     Back pain     BMI 31.0-31.9,adult 04/25/2019    Class 1 obesity due to excess calories with serious comorbidity and body mass index (BMI) of 31.0 to 31.9 in adult 12/09/2021    GERD (gastroesophageal reflux disease)     Hypertension        Allergies   Allergen Reactions    Levaquin [Levofloxacin] GI Intolerance and Nausea Only    Rosuvastatin Myalgia       Past Surgical History:   Procedure Laterality Date    APPENDECTOMY      CHOLECYSTECTOMY      HERNIA REPAIR      inguinal/umbilical - last assessed 6/29/16    AZ EXC B9 LESION MRGN XCP SK TG T/A/L 1.1-2.0 CM Right 1/20/2016    Procedure: REVISION SCAR ABDOMINAL Exploration of painful scar on right upper abdomen;  Surgeon: Arturo Waters MD;  Location: [facility];  Service: General    AZ REPAIR FIRST ABDOMINAL WALL HERNIA N/A 1/20/2016    Procedure: REPAIR HERNIA VENTRAL (SMALL EPIGASTRIC HERNIA);  Surgeon: Arturo Waters MD;  Location: [facility];  Service: General       Family History   Problem Relation Age of Onset    Cancer Mother         lung    Hypertension Mother     Arthritis Mother     Hypertension Father     Hiatal hernia Father     Arthritis Father     Anxiety disorder Father     Cancer Maternal Uncle        Social History     Socioeconomic History    Marital status: /Civil Union     Spouse name: None    Number of children: None    Years of education: None    Highest education level: None   Occupational History    None   Tobacco Use    Smoking status: Never    Smokeless tobacco: Never    Vaping Use    Vaping status: Never Used   Substance and Sexual Activity    Alcohol use: No    Drug use: No    Sexual activity: None   Other Topics Concern    None   Social History Narrative    None     Social Determinants of Health     Financial Resource Strain: Not on file   Food Insecurity: Not on file   Transportation Needs: Not on file   Physical Activity: Not on file   Stress: Not on file   Social Connections: Not on file   Intimate Partner Violence: Not on file   Housing Stability: Not on file       Current Outpatient Medications   Medication Sig Dispense Refill    albuterol (Proventil HFA) 90 mcg/act inhaler Inhale 2 puffs every 6 (six) hours as needed for wheezing 6.7 g 0    ALPRAZolam (XANAX) 0.25 mg tablet Take 1 tablet (0.25 mg total) by mouth 3 (three) times a day as needed for anxiety 30 tablet 0    dicyclomine (BENTYL) 20 mg tablet Take 1 tablet (20 mg total) by mouth 4 (four) times a day (before meals and at bedtime) 120 tablet 5    fexofenadine-pseudoephedrine (ALLEGRA-D)  MG per tablet Take 1 tablet by mouth daily      losartan (COZAAR) 50 mg tablet take 1 tablet by mouth once daily 90 tablet 1    ondansetron (ZOFRAN-ODT) 4 mg disintegrating tablet Take 1 tablet (4 mg total) by mouth every 6 (six) hours as needed for nausea or vomiting 10 tablet 0    pantoprazole (PROTONIX) 40 mg tablet take 1 tablet by mouth once daily 90 tablet 1    sertraline (ZOLOFT) 25 mg tablet Take 1 tablet (25 mg total) by mouth daily 90 tablet 1    benzonatate (TESSALON) 200 MG capsule Take 1 capsule (200 mg total) by mouth 3 (three) times a day as needed for cough (Patient not taking: Reported on 1/30/2024) 20 capsule 0    fluticasone (FLONASE) 50 mcg/act nasal spray 1 spray into each nostril daily (Patient not taking: Reported on 1/9/2024)      ipratropium (ATROVENT) 0.03 % nasal spray 2 sprays into each nostril every 12 (twelve) hours (Patient not taking: Reported on 1/30/2024) 30 mL 0    nystatin (MYCOSTATIN)  "powder Apply topically 2 (two) times a day (Patient not taking: Reported on 12/27/2023) 15 g 0     No current facility-administered medications for this visit.       Review of Systems     As noted in HPI    Objective      /72 (BP Location: Left arm, Patient Position: Sitting, Cuff Size: Large)   Pulse 81   Temp 97.7 °F (36.5 °C) (Temporal)   Ht 5' 10\" (1.778 m)   Wt 90.9 kg (200 lb 6.4 oz)   SpO2 98%   BMI 28.75 kg/m²     Physical Exam  Vitals reviewed.   Constitutional:       General: He is not in acute distress.     Appearance: Normal appearance.   HENT:      Head: Normocephalic and atraumatic.      Nose: Nose normal.      Mouth/Throat:      Mouth: Mucous membranes are moist.      Pharynx: Oropharynx is clear.   Eyes:      Extraocular Movements: Extraocular movements intact.      Conjunctiva/sclera: Conjunctivae normal.      Pupils: Pupils are equal, round, and reactive to light.   Cardiovascular:      Rate and Rhythm: Normal rate and regular rhythm.      Pulses: Normal pulses.      Heart sounds: Normal heart sounds.   Pulmonary:      Effort: Pulmonary effort is normal.      Breath sounds: Normal breath sounds.   Abdominal:      General: Bowel sounds are normal.      Palpations: Abdomen is soft.      Tenderness: There is no abdominal tenderness.   Musculoskeletal:      Cervical back: Neck supple.      Right lower leg: No edema.      Left lower leg: No edema.   Skin:     General: Skin is warm and dry.      Capillary Refill: Capillary refill takes less than 2 seconds.   Neurological:      Mental Status: He is alert and oriented to person, place, and time.   Psychiatric:         Mood and Affect: Mood normal.         Behavior: Behavior normal.         Thought Content: Thought content normal.         Judgment: Judgment normal.             Some portions of this record may have been generated with voice recognition software. There may be translation, syntax, or grammatical errors. Occasional wrong word or " "\"sound-a-like\" substitutions may have occurred due to the inherent limitations of the voice recognition software. Read the chart carefully and recognize, using context, where substations may have occurred. If you have any questions, please contact the dictating provider for clarification or correction, as needed.  "

## 2024-01-30 NOTE — PATIENT INSTRUCTIONS
Prediabetes   AMBULATORY CARE:   Prediabetes  is a blood glucose (sugar) level that is higher than normal. It is not high enough to be considered diabetes. Prediabetes increases your risk for type 2 diabetes and heart disease. The risk is highest if you have high blood pressure or high cholesterol.  The following increase your risk for prediabetes:   Excess body weight or obesity    Lack of physical activity    Older age    Family history of diabetes (parent or sibling)    A history of heart disease, gestational diabetes, or polycystic ovary syndrome (PCOS)    High blood pressure or cholesterol levels    Being , , ,  American, or     In children, having a mother with diabetes or gestational diabetes mellitus (GDM) during the pregnancy    Signs and symptoms:  Prediabetes may not cause any symptoms.  Call your doctor if:   You have more hunger or thirst than usual.    You are urinating more often than usual.    You are always exhausted.    You have blurred vision.    You have questions or concerns about your condition or care.    Prevent or delay type 2 diabetes:  Healthy choices work best to delay or prevent type 2 diabetes. You may be given the following guidelines from your healthcare provider:  Get regular physical activity.  Adults should get at least 150 minutes (2.5 hours) of moderate physical activity every week. Spread the amount of activity over at least 3 days a week. Do not skip more than 2 days in a row. Children should get at least 60 minutes of moderate physical activity on most days of the week. Examples of moderate physical activity include brisk walking, running, and swimming. Do not sit for longer than 30 minutes at a time. Work with your healthcare provider to create a plan for physical activity.         Maintain a healthy body weight.  Your healthcare provider can tell you what a healthy weight is for you. Your provider can help you create  a weight-loss plan, if needed. Even a weight loss of 3% to 7% of excess body weight can be helpful.    Eat a variety of healthy foods.  Examples include vegetables, fruit, whole-grains, low-fat dairy, healthy fats, fish, and lean meat or protein foods. Eat fewer sweets, such as candy, cookies, regular soda, and sweetened drinks. You can also decrease calories by eating smaller portion sizes. Work with your healthcare provider or dietitian to develop a meal plan that is right for you.         Take medicine as directed.  Your healthcare provider may give diabetes medicine if you are at high risk for diabetes. You may also need medicines for high blood pressure or high cholesterol.    Do not smoke.  Do not use e-cigarettes or smokeless tobacco in place of cigarettes or to help you quit. They still contain nicotine. Ask your healthcare provider for information if you currently smoke and need help quitting.    Start with small goals and work your way up.  For example, a goal may be to get 3 days of physical activity each week. You can add a day after 3 weeks or so of activity. A goal may also be safe and steady weight loss. Examples are losing 1 to 2 pounds each week or focusing on losing 5 pounds at a time.    Follow up with your doctor as directed:  You will need to return every year to get tested for diabetes, if you have prediabetes. Your provider may also recommend counseling to help you make food or physical activity changes. Write down your questions so you remember to ask them during your visits.  © Copyright [employer] 2023 Information is for End User's use only and may not be sold, redistributed or otherwise used for commercial purposes.  The above information is an  only. It is not intended as medical advice for individual conditions or treatments. Talk to your doctor, nurse or pharmacist before following any medical regimen to see if it is safe and effective for you.

## 2024-02-05 ENCOUNTER — TELEPHONE (OUTPATIENT)
Dept: GASTROENTEROLOGY | Facility: CLINIC | Age: 49
End: 2024-02-05

## 2024-02-05 NOTE — TELEPHONE ENCOUNTER
PT WIFE CALLING W/ PT NEXT TO HER FOR PROCEDURE TIME. ADVISED THEY SHOULD BE RECEIVING A CALL SOON.

## 2024-02-06 ENCOUNTER — HOSPITAL ENCOUNTER (OUTPATIENT)
Dept: GASTROENTEROLOGY | Facility: AMBULARY SURGERY CENTER | Age: 49
Setting detail: OUTPATIENT SURGERY
Discharge: HOME | End: 2024-02-06
Attending: INTERNAL MEDICINE
Payer: COMMERCIAL

## 2024-02-06 ENCOUNTER — ANESTHESIA (OUTPATIENT)
Dept: GASTROENTEROLOGY | Facility: AMBULARY SURGERY CENTER | Age: 49
End: 2024-02-06

## 2024-02-06 ENCOUNTER — ANESTHESIA EVENT (OUTPATIENT)
Dept: GASTROENTEROLOGY | Facility: AMBULARY SURGERY CENTER | Age: 49
End: 2024-02-06

## 2024-02-06 VITALS
HEIGHT: 70 IN | TEMPERATURE: 97.5 F | HEART RATE: 61 BPM | OXYGEN SATURATION: 98 % | DIASTOLIC BLOOD PRESSURE: 74 MMHG | RESPIRATION RATE: 18 BRPM | WEIGHT: 194 LBS | BODY MASS INDEX: 27.77 KG/M2 | SYSTOLIC BLOOD PRESSURE: 109 MMHG

## 2024-02-06 DIAGNOSIS — Z12.11 SCREENING FOR COLON CANCER: ICD-10-CM

## 2024-02-06 DIAGNOSIS — R10.11 RIGHT UPPER QUADRANT ABDOMINAL PAIN: ICD-10-CM

## 2024-02-06 DIAGNOSIS — K21.9 GASTROESOPHAGEAL REFLUX DISEASE, UNSPECIFIED WHETHER ESOPHAGITIS PRESENT: ICD-10-CM

## 2024-02-06 PROCEDURE — 88342 IMHCHEM/IMCYTCHM 1ST ANTB: CPT | Performed by: PATHOLOGY

## 2024-02-06 PROCEDURE — 45380 COLONOSCOPY AND BIOPSY: CPT | Performed by: INTERNAL MEDICINE

## 2024-02-06 PROCEDURE — 88305 TISSUE EXAM BY PATHOLOGIST: CPT | Performed by: PATHOLOGY

## 2024-02-06 PROCEDURE — 43239 EGD BIOPSY SINGLE/MULTIPLE: CPT | Performed by: INTERNAL MEDICINE

## 2024-02-06 RX ORDER — PROPOFOL 10 MG/ML
INJECTION, EMULSION INTRAVENOUS AS NEEDED
Status: DISCONTINUED | OUTPATIENT
Start: 2024-02-06 | End: 2024-02-06

## 2024-02-06 RX ORDER — SODIUM CHLORIDE, SODIUM LACTATE, POTASSIUM CHLORIDE, CALCIUM CHLORIDE 600; 310; 30; 20 MG/100ML; MG/100ML; MG/100ML; MG/100ML
INJECTION, SOLUTION INTRAVENOUS CONTINUOUS PRN
Status: DISCONTINUED | OUTPATIENT
Start: 2024-02-06 | End: 2024-02-06

## 2024-02-06 RX ADMIN — PROPOFOL 50 MG: 10 INJECTION, EMULSION INTRAVENOUS at 11:45

## 2024-02-06 RX ADMIN — PROPOFOL 50 MG: 10 INJECTION, EMULSION INTRAVENOUS at 11:47

## 2024-02-06 RX ADMIN — PROPOFOL 50 MG: 10 INJECTION, EMULSION INTRAVENOUS at 11:42

## 2024-02-06 RX ADMIN — PROPOFOL 150 MG: 10 INJECTION, EMULSION INTRAVENOUS at 11:39

## 2024-02-06 RX ADMIN — PROPOFOL 50 MG: 10 INJECTION, EMULSION INTRAVENOUS at 11:57

## 2024-02-06 RX ADMIN — PROPOFOL 50 MG: 10 INJECTION, EMULSION INTRAVENOUS at 11:49

## 2024-02-06 RX ADMIN — PROPOFOL 50 MG: 10 INJECTION, EMULSION INTRAVENOUS at 11:41

## 2024-02-06 RX ADMIN — Medication 40 MG: at 11:59

## 2024-02-06 RX ADMIN — SODIUM CHLORIDE, SODIUM LACTATE, POTASSIUM CHLORIDE, AND CALCIUM CHLORIDE: .6; .31; .03; .02 INJECTION, SOLUTION INTRAVENOUS at 11:37

## 2024-02-06 NOTE — ANESTHESIA PROCEDURE NOTES
Anesthesia Notable Event    Date/Time: 2/6/2024 12:33 PM    Performed by: Glynn Alvarado MD  Authorized by: Glynn Alvarado MD

## 2024-02-06 NOTE — ANESTHESIA POSTPROCEDURE EVALUATION
Post-Op Assessment Note    CV Status:  Stable    Pain management: adequate       Mental Status:  Alert and awake   Hydration Status:  Euvolemic   PONV Controlled:  Controlled   Airway Patency:  Patent     Post Op Vitals Reviewed: Yes    No anethesia notable event occurred.    Staff: Anesthesiologist, CRNA               BP   121/78   Temp 98   Pulse 78   Resp 16   SpO2 98

## 2024-02-06 NOTE — ANESTHESIA PREPROCEDURE EVALUATION
Procedure:  EGD  COLONOSCOPY    Relevant Problems   CARDIO   (+) Essential hypertension   (+) Hyperlipidemia      GI/HEPATIC   (+) Gastroesophageal reflux disease      NEURO/PSYCH   (+) Anxiety   (+) Depression      Other   (+) Tonsillith        Physical Exam    Airway    Mallampati score: IV  TM Distance: >3 FB  Neck ROM: full     Dental       Cardiovascular  Cardiovascular exam normal    Pulmonary  Pulmonary exam normal     Other Findings        Anesthesia Plan  ASA Score- 2     Anesthesia Type- IV sedation with anesthesia with ASA Monitors.         Additional Monitors:     Airway Plan:            Plan Factors-Exercise tolerance (METS): >4 METS.    Chart reviewed. EKG reviewed.  Existing labs reviewed. Patient summary reviewed.    Patient is not a current smoker.  Patient did not smoke on day of surgery.    Obstructive sleep apnea risk education given perioperatively.        Induction- intravenous.    Postoperative Plan- Plan for postoperative opioid use.     Informed Consent- Anesthetic plan and risks discussed with patient and spouse.  I personally reviewed this patient with the CRNA. Discussed and agreed on the Anesthesia Plan with the CRNA..

## 2024-02-06 NOTE — INTERVAL H&P NOTE
H&P reviewed. After examining the patient I find no changes in the patients condition since the H&P had been written.    Vitals:    02/06/24 1117   BP: 121/76   Pulse: 73   Resp: 18   Temp: (!) 96.5 °F (35.8 °C)   SpO2: 97%

## 2024-02-09 ENCOUNTER — NURSE TRIAGE (OUTPATIENT)
Age: 49
End: 2024-02-09

## 2024-02-09 ENCOUNTER — TELEPHONE (OUTPATIENT)
Age: 49
End: 2024-02-09

## 2024-02-09 PROCEDURE — 88305 TISSUE EXAM BY PATHOLOGIST: CPT | Performed by: PATHOLOGY

## 2024-02-09 PROCEDURE — 88342 IMHCHEM/IMCYTCHM 1ST ANTB: CPT | Performed by: PATHOLOGY

## 2024-02-09 NOTE — TELEPHONE ENCOUNTER
"Pt calling in for biopsy results, please review for pt and send [employer] message. Thank you   Reason for Disposition   Nursing judgment    Answer Assessment - Initial Assessment Questions  1. REASON FOR CALL or QUESTION: \"What is your reason for calling today?\" or \"How can I best help you?\" or \"What question do you have that I can help answer?\"      Results    Protocols used: Information Only Call - No Triage-ADULT-[place]    "

## 2024-02-21 PROBLEM — J32.9 CHRONIC RECURRENT SINUSITIS: Status: RESOLVED | Noted: 2021-01-18 | Resolved: 2024-02-21

## 2024-03-19 ENCOUNTER — NURSE TRIAGE (OUTPATIENT)
Dept: OTHER | Facility: OTHER | Age: 49
End: 2024-03-19

## 2024-03-19 NOTE — TELEPHONE ENCOUNTER
Patient has been having episodes of stuttering for 2 days. Patient stated that he has been having intermittent aching in his shoulders, more in a left side, resolves when patient moves his shoulders. Patient refused to go to [facility] for evaluation today. Per patient's request, appointment is scheduled for tomorrow 3/20 at 1800.

## 2024-03-19 NOTE — TELEPHONE ENCOUNTER
"Reason for Disposition  • [1] Weakness of the face, arm / hand, or leg / foot on one side of the body AND [2] gradual onset (e.g., days to weeks) AND [3] present now    Answer Assessment - Initial Assessment Questions  1. SYMPTOM: \"What is the main symptom you are concerned about?\" (e.g., weakness, numbness)      Patient began to stutter 2 weeks ago.   2. ONSET: \"When did this start?\" (minutes, hours, days; while sleeping)      2 weeks ago   3. LAST NORMAL: \"When was the last time you were normal (no symptoms)?\"      Before 2 weeks ago   4. PATTERN \"Does this come and go, or has it been constant since it started?\"  \"Is it present now?\"      Intermittent   5. CARDIAC SYMPTOMS: \"Have you had any of the following symptoms: chest pain, difficulty breathing, palpitations?\"      No   6. NEUROLOGIC SYMPTOMS: \"Have you had any of the following symptoms: headache, dizziness, vision loss, double vision, changes in speech, unsteady on your feet?\"      Changing in a speech as stuttering, SOB on exertion.   7. OTHER SYMPTOMS: \"Do you have any other symptoms?\"      Aching in shoulders, more in a left shoulder and left side of the neck, intermittent.    Protocols used: Neurologic Deficit-ADULT-[place], Neurologic Deficit-ADULT-[facility]    "

## 2024-05-01 DIAGNOSIS — K21.9 GASTROESOPHAGEAL REFLUX DISEASE: ICD-10-CM

## 2024-05-01 DIAGNOSIS — I10 ESSENTIAL HYPERTENSION: ICD-10-CM

## 2024-05-01 DIAGNOSIS — F41.9 ANXIETY: ICD-10-CM

## 2024-05-01 DIAGNOSIS — F32.1 CURRENT MODERATE EPISODE OF MAJOR DEPRESSIVE DISORDER WITHOUT PRIOR EPISODE (HCC): ICD-10-CM

## 2024-05-03 RX ORDER — PANTOPRAZOLE SODIUM 40 MG/1
40 TABLET, DELAYED RELEASE ORAL DAILY
Qty: 90 TABLET | Refills: 1 | Status: SHIPPED | OUTPATIENT
Start: 2024-05-03

## 2024-05-03 RX ORDER — ALPRAZOLAM 0.25 MG/1
0.25 TABLET ORAL DAILY PRN
Qty: 30 TABLET | Refills: 0 | Status: SHIPPED | OUTPATIENT
Start: 2024-05-03

## 2024-05-03 RX ORDER — LOSARTAN POTASSIUM 50 MG/1
50 TABLET ORAL DAILY
Qty: 90 TABLET | Refills: 1 | Status: SHIPPED | OUTPATIENT
Start: 2024-05-03

## 2024-05-03 RX ORDER — SERTRALINE HYDROCHLORIDE 25 MG/1
25 TABLET, FILM COATED ORAL DAILY
Qty: 90 TABLET | Refills: 1 | Status: SHIPPED | OUTPATIENT
Start: 2024-05-03 | End: 2024-10-30

## 2024-07-01 DIAGNOSIS — Z12.83 SCREENING FOR SKIN CANCER: Primary | ICD-10-CM

## 2024-08-02 DIAGNOSIS — R10.11 RIGHT UPPER QUADRANT ABDOMINAL PAIN: ICD-10-CM

## 2024-08-02 DIAGNOSIS — F41.9 ANXIETY: ICD-10-CM

## 2024-08-02 RX ORDER — ALPRAZOLAM 0.25 MG/1
0.25 TABLET ORAL DAILY PRN
Qty: 30 TABLET | Refills: 0 | Status: SHIPPED | OUTPATIENT
Start: 2024-08-02

## 2024-08-04 RX ORDER — DICYCLOMINE HCL 20 MG
20 TABLET ORAL
Qty: 120 TABLET | Refills: 0 | Status: SHIPPED | OUTPATIENT
Start: 2024-08-04

## 2024-11-08 DIAGNOSIS — I10 ESSENTIAL HYPERTENSION: ICD-10-CM

## 2024-11-08 RX ORDER — LOSARTAN POTASSIUM 50 MG/1
50 TABLET ORAL DAILY
Qty: 90 TABLET | Refills: 1 | Status: SHIPPED | OUTPATIENT
Start: 2024-11-08

## 2024-11-08 NOTE — TELEPHONE ENCOUNTER
Medication:     losartan (COZAAR) 50 mg tablet       Dose/Frequency: Take 1 tablet (50 mg total) by mouth daily,     Quantity: 90    Pharmacy: [id number] - [place] [facility] - [address] [place] [address]      Office:   [x] PCP/Provider -   [] Speciality/Provider -     Does the patient have enough for 3 days?   [] Yes   [x] No - Send as HP to POD

## 2024-11-13 DIAGNOSIS — F32.1 CURRENT MODERATE EPISODE OF MAJOR DEPRESSIVE DISORDER WITHOUT PRIOR EPISODE (HCC): ICD-10-CM

## 2024-11-13 DIAGNOSIS — F41.9 ANXIETY: ICD-10-CM

## 2024-11-14 RX ORDER — SERTRALINE HYDROCHLORIDE 25 MG/1
25 TABLET, FILM COATED ORAL DAILY
Qty: 90 TABLET | Refills: 1 | Status: SHIPPED | OUTPATIENT
Start: 2024-11-14

## 2024-12-02 DIAGNOSIS — R10.11 RIGHT UPPER QUADRANT ABDOMINAL PAIN: ICD-10-CM

## 2024-12-03 RX ORDER — DICYCLOMINE HCL 20 MG
20 TABLET ORAL
Qty: 120 TABLET | Refills: 5 | Status: SHIPPED | OUTPATIENT
Start: 2024-12-03

## 2024-12-04 ENCOUNTER — OFFICE VISIT (OUTPATIENT)
Dept: FAMILY MEDICINE CLINIC | Facility: MEDICAL CENTER | Age: 49
End: 2024-12-04
Payer: COMMERCIAL

## 2024-12-04 VITALS
OXYGEN SATURATION: 95 % | BODY MASS INDEX: 32.38 KG/M2 | RESPIRATION RATE: 18 BRPM | TEMPERATURE: 98.3 F | WEIGHT: 226.2 LBS | DIASTOLIC BLOOD PRESSURE: 80 MMHG | SYSTOLIC BLOOD PRESSURE: 120 MMHG | HEART RATE: 75 BPM | HEIGHT: 70 IN

## 2024-12-04 DIAGNOSIS — R22.30: ICD-10-CM

## 2024-12-04 DIAGNOSIS — R09.81 SINUS CONGESTION: Primary | ICD-10-CM

## 2024-12-04 PROCEDURE — 99214 OFFICE O/P EST MOD 30 MIN: CPT | Performed by: STUDENT IN AN ORGANIZED HEALTH CARE EDUCATION/TRAINING PROGRAM

## 2024-12-04 RX ORDER — AZELASTINE 1 MG/ML
1 SPRAY, METERED NASAL 2 TIMES DAILY
Qty: 30 ML | Refills: 0 | Status: SHIPPED | OUTPATIENT
Start: 2024-12-04

## 2024-12-04 NOTE — PROGRESS NOTES
[facility] - Clinic Note  Chasity Neves DO, 24     Ahsan Ba Jr. MRN: [id number] : 1975 Age: 49 y.o.     Assessment/Plan     1. Sinus congestion (Primary)    -Continue Allegra-D  -Start Astelin nasal spray  - azelastine (ASTELIN) 0.1 % nasal spray; 1 spray into each nostril 2 (two) times a day Use in each nostril as directed  Dispense: 30 mL; Refill: 0    2. Subcutaneous nodule of finger    - US extremity soft tissue; Future      Ahsan Ba Jr. acknowledged understanding of treatment plan, all questions answered.    Subjective      Ahsan Ba Jr. is a 49 y.o. male who presents for acute visit.  He presents today with his wife.  Patient mentions bilateral ear fullness, left worse than right.  Patient describes he has longstanding sinus issues.  He takes Allegra and Flonase.  Ear fullness bothersome since last night.  He also endorses postnasal drip. No fevers. No facial pain. No cough or wheezing.  Patient also mentions nodularity right fifth digit.  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    The following portions of the patient's history were reviewed and updated as appropriate: allergies, current medications, past family history, past medical history, past social history, past surgical history and problem list.     Past Medical History:   Diagnosis Date    Allergic     Anxiety     Back pain     BMI 31.0-31.9,adult 2019    Class 1 obesity due to excess calories with serious comorbidity and body mass index (BMI) of 31.0 to 31.9 in adult 2021    GERD (gastroesophageal reflux disease)     Hypertension     Liver disease        Allergies   Allergen Reactions    Levaquin [Levofloxacin] GI Intolerance and Nausea Only    Rosuvastatin Myalgia       Past Surgical History:   Procedure Laterality Date    APPENDECTOMY      CHOLECYSTECTOMY      HERNIA REPAIR      inguinal/umbilical - last assessed 16    OH EXC B9 LESION MRGN XCP SK TG  T/A/L 1.1-2.0 CM Right 1/20/2016    Procedure: REVISION SCAR ABDOMINAL Exploration of painful scar on right upper abdomen;  Surgeon: Arturo Waters MD;  Location: [facility];  Service: General    NC REPAIR FIRST ABDOMINAL WALL HERNIA N/A 1/20/2016    Procedure: REPAIR HERNIA VENTRAL (SMALL EPIGASTRIC HERNIA);  Surgeon: Arturo Waters MD;  Location: [facility];  Service: General       Family History   Problem Relation Age of Onset    Cancer Mother         lung    Hypertension Mother     Arthritis Mother     Hypertension Father     Hiatal hernia Father     Arthritis Father     Anxiety disorder Father     Cancer Maternal Uncle        Social History     Socioeconomic History    Marital status: /Civil Union     Spouse name: None    Number of children: None    Years of education: None    Highest education level: None   Occupational History    None   Tobacco Use    Smoking status: Never    Smokeless tobacco: Never   Vaping Use    Vaping status: Never Used   Substance and Sexual Activity    Alcohol use: No    Drug use: No    Sexual activity: None   Other Topics Concern    None   Social History Narrative    None     Social Drivers of Health     Financial Resource Strain: Not on file   Food Insecurity: Not on file   Transportation Needs: Not on file   Physical Activity: Not on file   Stress: Not on file   Social Connections: Not on file   Intimate Partner Violence: Not on file   Housing Stability: Not on file       Current Outpatient Medications   Medication Sig Dispense Refill    albuterol (Proventil HFA) 90 mcg/act inhaler Inhale 2 puffs every 6 (six) hours as needed for wheezing 6.7 g 0    ALPRAZolam (XANAX) 0.25 mg tablet Take 1 tablet (0.25 mg total) by mouth daily as needed for anxiety 30 tablet 0    dicyclomine (BENTYL) 20 mg tablet Take 1 tablet (20 mg total) by mouth 4 (four) times a day (before meals and at bedtime) 120 tablet 5    fexofenadine-pseudoephedrine (ALLEGRA-D)  MG per tablet Take 1 tablet by  "mouth daily      fluticasone (FLONASE) 50 mcg/act nasal spray 1 spray into each nostril daily      losartan (COZAAR) 50 mg tablet Take 1 tablet (50 mg total) by mouth daily 90 tablet 1    ondansetron (ZOFRAN-ODT) 4 mg disintegrating tablet Take 1 tablet (4 mg total) by mouth every 6 (six) hours as needed for nausea or vomiting 10 tablet 0    pantoprazole (PROTONIX) 40 mg tablet Take 1 tablet (40 mg total) by mouth daily 90 tablet 1    sertraline (ZOLOFT) 25 mg tablet take 1 tablet by mouth once daily 90 tablet 1    ipratropium (ATROVENT) 0.03 % nasal spray 2 sprays into each nostril every 12 (twelve) hours (Patient not taking: Reported on 12/4/2024) 30 mL 0     No current facility-administered medications for this visit.       Review of Systems   HENT:  Positive for ear pain.         And as noted in HPI    Objective      /80 (BP Location: Left arm, Patient Position: Sitting, Cuff Size: Standard)   Pulse 75   Temp 98.3 °F (36.8 °C) (Temporal)   Resp 18   Ht 5' 10\" (1.778 m)   Wt 103 kg (226 lb 3.2 oz)   SpO2 95%   BMI 32.46 kg/m²     Physical Exam  Vitals reviewed.   Constitutional:       General: He is not in acute distress.     Appearance: Normal appearance.   HENT:      Head: Normocephalic and atraumatic.      Right Ear: Tympanic membrane, ear canal and external ear normal. No middle ear effusion. There is no impacted cerumen.      Left Ear: Tympanic membrane, ear canal and external ear normal.  No middle ear effusion. There is no impacted cerumen.      Nose: Congestion present.      Mouth/Throat:      Mouth: Mucous membranes are moist.      Pharynx: Posterior oropharyngeal erythema present.      Comments: Mild erythema and postnasal drip  Eyes:      General:         Right eye: No discharge.         Left eye: No discharge.      Extraocular Movements: Extraocular movements intact.      Conjunctiva/sclera: Conjunctivae normal.      Pupils: Pupils are equal, round, and reactive to light. " "  Cardiovascular:      Rate and Rhythm: Normal rate and regular rhythm.      Pulses: Normal pulses.      Heart sounds: Normal heart sounds.   Pulmonary:      Effort: Pulmonary effort is normal. No respiratory distress.      Breath sounds: Normal breath sounds. No wheezing.   Musculoskeletal:      Right hand: No bony tenderness. Normal range of motion. Normal strength. Normal sensation. Normal capillary refill.      Cervical back: Neck supple.      Comments: Small subcutaneous nodularity base of fifth digit right hand, tender, mobile   Lymphadenopathy:      Cervical: No cervical adenopathy.   Skin:     General: Skin is warm and dry.   Neurological:      Mental Status: He is alert.   Psychiatric:         Mood and Affect: Mood normal.         Behavior: Behavior normal.         Thought Content: Thought content normal.             Some portions of this record may have been generated with voice recognition software. There may be translation, syntax, or grammatical errors. Occasional wrong word or \"sound-a-like\" substitutions may have occurred due to the inherent limitations of the voice recognition software. Read the chart carefully and recognize, using context, where substations may have occurred. If you have any questions, please contact the dictating provider for clarification or correction, as needed.  "

## 2025-01-02 DIAGNOSIS — R10.11 RIGHT UPPER QUADRANT ABDOMINAL PAIN: ICD-10-CM

## 2025-01-03 RX ORDER — DICYCLOMINE HCL 20 MG
TABLET ORAL
Qty: 360 TABLET | Refills: 1 | Status: SHIPPED | OUTPATIENT
Start: 2025-01-03

## 2025-01-15 DIAGNOSIS — R09.81 SINUS CONGESTION: ICD-10-CM

## 2025-01-15 RX ORDER — AZELASTINE HYDROCHLORIDE 137 UG/1
1 SPRAY, METERED NASAL 2 TIMES DAILY
Qty: 30 ML | Refills: 1 | Status: SHIPPED | OUTPATIENT
Start: 2025-01-15

## 2025-02-17 ENCOUNTER — APPOINTMENT (OUTPATIENT)
Dept: RADIOLOGY | Facility: MEDICAL CENTER | Age: 50
End: 2025-02-17
Payer: COMMERCIAL

## 2025-02-17 ENCOUNTER — OFFICE VISIT (OUTPATIENT)
Dept: FAMILY MEDICINE CLINIC | Facility: MEDICAL CENTER | Age: 50
End: 2025-02-17
Payer: COMMERCIAL

## 2025-02-17 VITALS
RESPIRATION RATE: 18 BRPM | HEIGHT: 70 IN | OXYGEN SATURATION: 96 % | DIASTOLIC BLOOD PRESSURE: 82 MMHG | SYSTOLIC BLOOD PRESSURE: 120 MMHG | TEMPERATURE: 98.4 F | HEART RATE: 87 BPM | BODY MASS INDEX: 33.33 KG/M2 | WEIGHT: 232.8 LBS

## 2025-02-17 DIAGNOSIS — R10.11 RIGHT UPPER QUADRANT ABDOMINAL PAIN: Primary | ICD-10-CM

## 2025-02-17 DIAGNOSIS — R09.89 UPPER RESPIRATORY SYMPTOM: ICD-10-CM

## 2025-02-17 DIAGNOSIS — R06.02 SOB (SHORTNESS OF BREATH) ON EXERTION: ICD-10-CM

## 2025-02-17 DIAGNOSIS — J10.1 INFLUENZA B: Primary | ICD-10-CM

## 2025-02-17 LAB
SARS-COV-2 AG UPPER RESP QL IA: NEGATIVE
SL AMB POCT RAPID FLU A: NORMAL
SL AMB POCT RAPID FLU B: NORMAL
VALID CONTROL: NORMAL

## 2025-02-17 PROCEDURE — 87811 SARS-COV-2 COVID19 W/OPTIC: CPT | Performed by: INTERNAL MEDICINE

## 2025-02-17 PROCEDURE — 99213 OFFICE O/P EST LOW 20 MIN: CPT | Performed by: INTERNAL MEDICINE

## 2025-02-17 PROCEDURE — 87804 INFLUENZA ASSAY W/OPTIC: CPT | Performed by: INTERNAL MEDICINE

## 2025-02-17 PROCEDURE — 71046 X-RAY EXAM CHEST 2 VIEWS: CPT

## 2025-02-17 RX ORDER — ALBUTEROL SULFATE 90 UG/1
2 INHALANT RESPIRATORY (INHALATION) EVERY 6 HOURS PRN
Qty: 6.7 G | Refills: 0 | Status: SHIPPED | OUTPATIENT
Start: 2025-02-17

## 2025-02-17 RX ORDER — OSELTAMIVIR PHOSPHATE 75 MG/1
75 CAPSULE ORAL EVERY 12 HOURS SCHEDULED
Qty: 10 CAPSULE | Refills: 0 | Status: SHIPPED | OUTPATIENT
Start: 2025-02-17 | End: 2025-02-22

## 2025-02-17 RX ORDER — HYOSCYAMINE SULFATE 0.125 MG
0.12 TABLET ORAL EVERY 4 HOURS PRN
Qty: 30 TABLET | Refills: 3 | Status: SHIPPED | OUTPATIENT
Start: 2025-02-17

## 2025-02-17 RX ORDER — BENZONATATE 200 MG/1
200 CAPSULE ORAL 3 TIMES DAILY PRN
Qty: 20 CAPSULE | Refills: 0 | Status: SHIPPED | OUTPATIENT
Start: 2025-02-17

## 2025-02-17 NOTE — PROGRESS NOTES
Name: Ahsan Ba Jr.      : 1975      MRN: [id number]  Encounter Provider: Nadege Shah MD  Encounter Date: 2025   Encounter department: [facility] [place]  :  Assessment & Plan  Influenza B    Orders:    oseltamivir (TAMIFLU) 75 mg capsule; Take 1 capsule (75 mg total) by mouth every 12 (twelve) hours for 5 days    benzonatate (TESSALON) 200 MG capsule; Take 1 capsule (200 mg total) by mouth 3 (three) times a day as needed for cough  Was told to take Tylenol, vitamin C and zinc.  Was told to drink a lot of fluids  Upper respiratory symptom    Orders:    POCT rapid flu A and B    POCT Rapid Covid Ag    SOB (shortness of breath) on exertion    Orders:    XR chest pa and lateral; Future    albuterol (Proventil HFA) 90 mcg/act inhaler; Inhale 2 puffs every 6 (six) hours as needed for wheezing           History of Present Illness   URI   This is a new problem. The current episode started in the past 7 days. The problem has been unchanged. The maximum temperature recorded prior to his arrival was 101 - 101.9 F. The fever has been present for 1 to 2 days. Associated symptoms include congestion, coughing, headaches, rhinorrhea and a sore throat. Pertinent negatives include no abdominal pain, chest pain, diarrhea, dysuria, ear pain, nausea, neck pain, rash, sinus pain, sneezing, vomiting or wheezing. He has tried acetaminophen for the symptoms. The treatment provided mild relief.     Review of Systems   Constitutional:  Positive for chills, fatigue and fever. Negative for diaphoresis.   HENT:  Positive for congestion, rhinorrhea and sore throat. Negative for ear discharge, ear pain, hearing loss, postnasal drip, sinus pressure, sinus pain, sneezing and voice change.    Eyes:  Negative for pain, discharge, redness and visual disturbance.   Respiratory:  Positive for cough and shortness of breath. Negative for chest tightness and wheezing.    Cardiovascular:  Negative for chest pain,  "palpitations and leg swelling.   Gastrointestinal:  Negative for abdominal distention, abdominal pain, blood in stool, constipation, diarrhea, nausea and vomiting.   Endocrine: Negative for cold intolerance, heat intolerance, polydipsia, polyphagia and polyuria.   Genitourinary:  Negative for dysuria, flank pain, frequency, hematuria and urgency.   Musculoskeletal:  Positive for myalgias. Negative for arthralgias, back pain, gait problem, joint swelling, neck pain and neck stiffness.   Skin:  Negative for rash.   Neurological:  Positive for headaches. Negative for dizziness, tremors, syncope, facial asymmetry, speech difficulty, weakness, light-headedness and numbness.   Hematological:  Does not bruise/bleed easily.   Psychiatric/Behavioral:  Negative for behavioral problems, confusion and sleep disturbance. The patient is not nervous/anxious.        Objective   BP (S) 120/82 (BP Location: Left arm, Patient Position: Sitting, Cuff Size: Large)   Pulse 87   Temp 98.4 °F (36.9 °C) (Temporal)   Resp 18   Ht 5' 10\" (1.778 m)   Wt 106 kg (232 lb 12.8 oz)   SpO2 96%   BMI 33.40 kg/m²      Physical Exam  Constitutional:       General: He is not in acute distress.     Appearance: He is well-developed. He is not diaphoretic.   HENT:      Head: Normocephalic and atraumatic.      Right Ear: External ear normal.      Left Ear: External ear normal.      Nose: Congestion present.      Mouth/Throat:      Pharynx: Posterior oropharyngeal erythema present.   Eyes:      General: No scleral icterus.        Right eye: No discharge.         Left eye: No discharge.      Conjunctiva/sclera: Conjunctivae normal.   Cardiovascular:      Rate and Rhythm: Normal rate and regular rhythm.      Heart sounds: Normal heart sounds. No murmur heard.     No friction rub. No gallop.   Pulmonary:      Effort: Pulmonary effort is normal. No respiratory distress.      Breath sounds: Wheezing present. No rales.   Abdominal:      General: Bowel " sounds are normal. There is no distension.      Palpations: Abdomen is soft.      Tenderness: There is no abdominal tenderness. There is no guarding or rebound.   Musculoskeletal:         General: No tenderness.   Skin:     General: Skin is warm and dry.      Findings: No erythema or rash.   Neurological:      Mental Status: He is alert and oriented to person, place, and time.      Cranial Nerves: No cranial nerve deficit.      Sensory: No sensory deficit.      Motor: No abnormal muscle tone.   Psychiatric:         Behavior: Behavior normal.

## 2025-02-19 ENCOUNTER — RESULTS FOLLOW-UP (OUTPATIENT)
Dept: FAMILY MEDICINE CLINIC | Facility: MEDICAL CENTER | Age: 50
End: 2025-02-19

## 2025-02-20 ENCOUNTER — APPOINTMENT (EMERGENCY)
Dept: RADIOLOGY | Facility: HOSPITAL | Age: 50
End: 2025-02-20
Payer: COMMERCIAL

## 2025-02-20 ENCOUNTER — APPOINTMENT (EMERGENCY)
Dept: CT IMAGING | Facility: HOSPITAL | Age: 50
End: 2025-02-20
Payer: COMMERCIAL

## 2025-02-20 ENCOUNTER — TELEPHONE (OUTPATIENT)
Dept: EMERGENCY DEPT | Facility: HOSPITAL | Age: 50
End: 2025-02-20

## 2025-02-20 ENCOUNTER — TELEPHONE (OUTPATIENT)
Age: 50
End: 2025-02-20

## 2025-02-20 ENCOUNTER — HOSPITAL ENCOUNTER (EMERGENCY)
Facility: HOSPITAL | Age: 50
Discharge: HOME | End: 2025-02-20
Attending: EMERGENCY MEDICINE
Payer: COMMERCIAL

## 2025-02-20 VITALS
RESPIRATION RATE: 20 BRPM | SYSTOLIC BLOOD PRESSURE: 106 MMHG | OXYGEN SATURATION: 99 % | HEART RATE: 83 BPM | TEMPERATURE: 99 F | DIASTOLIC BLOOD PRESSURE: 60 MMHG

## 2025-02-20 DIAGNOSIS — J11.1 INFLUENZA: ICD-10-CM

## 2025-02-20 DIAGNOSIS — E86.0 DEHYDRATION: Primary | ICD-10-CM

## 2025-02-20 DIAGNOSIS — J20.9 COMPLICATED ACUTE BRONCHITIS: ICD-10-CM

## 2025-02-20 DIAGNOSIS — R55 SYNCOPE AND COLLAPSE: ICD-10-CM

## 2025-02-20 LAB
ALBUMIN SERPL BCG-MCNC: 4.2 G/DL (ref 3.5–5)
ALP SERPL-CCNC: 58 U/L (ref 34–104)
ALT SERPL W P-5'-P-CCNC: 31 U/L (ref 7–52)
ANION GAP SERPL CALCULATED.3IONS-SCNC: 6 MMOL/L (ref 4–13)
AST SERPL W P-5'-P-CCNC: 21 U/L (ref 13–39)
ATRIAL RATE: 108 BPM
BACTERIA UR QL AUTO: ABNORMAL /HPF
BASOPHILS # BLD AUTO: 0.03 THOUSANDS/ΜL (ref 0–0.1)
BASOPHILS NFR BLD AUTO: 0 % (ref 0–1)
BILIRUB SERPL-MCNC: 0.45 MG/DL (ref 0.2–1)
BILIRUB UR QL STRIP: NEGATIVE
BUN SERPL-MCNC: 13 MG/DL (ref 5–25)
CALCIUM SERPL-MCNC: 8.9 MG/DL (ref 8.4–10.2)
CARDIAC TROPONIN I PNL SERPL HS: <2 NG/L (ref ?–50)
CHLORIDE SERPL-SCNC: 102 MMOL/L (ref 96–108)
CLARITY UR: CLEAR
CO2 SERPL-SCNC: 28 MMOL/L (ref 21–32)
COLOR UR: YELLOW
CREAT SERPL-MCNC: 1.25 MG/DL (ref 0.6–1.3)
EOSINOPHIL # BLD AUTO: 0.02 THOUSAND/ΜL (ref 0–0.61)
EOSINOPHIL NFR BLD AUTO: 0 % (ref 0–6)
ERYTHROCYTE [DISTWIDTH] IN BLOOD BY AUTOMATED COUNT: 13.3 % (ref 11.6–15.1)
GFR SERPL CREATININE-BSD FRML MDRD: 67 ML/MIN/1.73SQ M
GLUCOSE SERPL-MCNC: 114 MG/DL (ref 65–140)
GLUCOSE UR STRIP-MCNC: NEGATIVE MG/DL
HCT VFR BLD AUTO: 46.5 % (ref 36.5–49.3)
HGB BLD-MCNC: 15 G/DL (ref 12–17)
HGB UR QL STRIP.AUTO: NEGATIVE
IMM GRANULOCYTES # BLD AUTO: 0.06 THOUSAND/UL (ref 0–0.2)
IMM GRANULOCYTES NFR BLD AUTO: 0 % (ref 0–2)
KETONES UR STRIP-MCNC: NEGATIVE MG/DL
LACTATE SERPL-SCNC: 1.8 MMOL/L (ref 0.5–2)
LEUKOCYTE ESTERASE UR QL STRIP: NEGATIVE
LYMPHOCYTES # BLD AUTO: 1.53 THOUSANDS/ΜL (ref 0.6–4.47)
LYMPHOCYTES NFR BLD AUTO: 10 % (ref 14–44)
MCH RBC QN AUTO: 26.5 PG (ref 26.8–34.3)
MCHC RBC AUTO-ENTMCNC: 32.3 G/DL (ref 31.4–37.4)
MCV RBC AUTO: 82 FL (ref 82–98)
MONOCYTES # BLD AUTO: 1.1 THOUSAND/ΜL (ref 0.17–1.22)
MONOCYTES NFR BLD AUTO: 7 % (ref 4–12)
MUCOUS THREADS UR QL AUTO: ABNORMAL
NEUTROPHILS # BLD AUTO: 13.26 THOUSANDS/ΜL (ref 1.85–7.62)
NEUTS SEG NFR BLD AUTO: 83 % (ref 43–75)
NITRITE UR QL STRIP: NEGATIVE
NON-SQ EPI CELLS URNS QL MICRO: ABNORMAL /HPF
NRBC BLD AUTO-RTO: 0 /100 WBCS
P AXIS: 53 DEGREES
PH UR STRIP.AUTO: 6.5 [PH]
PLATELET # BLD AUTO: 256 THOUSANDS/UL (ref 149–390)
PMV BLD AUTO: 9.3 FL (ref 8.9–12.7)
POTASSIUM SERPL-SCNC: 4.2 MMOL/L (ref 3.5–5.3)
PR INTERVAL: 128 MS
PROT SERPL-MCNC: 6.7 G/DL (ref 6.4–8.4)
PROT UR STRIP-MCNC: ABNORMAL MG/DL
QRS AXIS: 92 DEGREES
QRSD INTERVAL: 102 MS
QT INTERVAL: 310 MS
QTC INTERVAL: 415 MS
RBC # BLD AUTO: 5.65 MILLION/UL (ref 3.88–5.62)
RBC #/AREA URNS AUTO: ABNORMAL /HPF
SODIUM SERPL-SCNC: 136 MMOL/L (ref 135–147)
SP GR UR STRIP.AUTO: 1.03 (ref 1–1.03)
T WAVE AXIS: 50 DEGREES
UROBILINOGEN UR STRIP-ACNC: <2 MG/DL
VENTRICULAR RATE: 108 BPM
WBC # BLD AUTO: 16 THOUSAND/UL (ref 4.31–10.16)
WBC #/AREA URNS AUTO: ABNORMAL /HPF

## 2025-02-20 PROCEDURE — 80053 COMPREHEN METABOLIC PANEL: CPT | Performed by: EMERGENCY MEDICINE

## 2025-02-20 PROCEDURE — 96374 THER/PROPH/DIAG INJ IV PUSH: CPT

## 2025-02-20 PROCEDURE — 94640 AIRWAY INHALATION TREATMENT: CPT

## 2025-02-20 PROCEDURE — 93010 ELECTROCARDIOGRAM REPORT: CPT | Performed by: INTERNAL MEDICINE

## 2025-02-20 PROCEDURE — 71045 X-RAY EXAM CHEST 1 VIEW: CPT

## 2025-02-20 PROCEDURE — 93005 ELECTROCARDIOGRAM TRACING: CPT

## 2025-02-20 PROCEDURE — 99285 EMERGENCY DEPT VISIT HI MDM: CPT

## 2025-02-20 PROCEDURE — 81001 URINALYSIS AUTO W/SCOPE: CPT | Performed by: EMERGENCY MEDICINE

## 2025-02-20 PROCEDURE — 73030 X-RAY EXAM OF SHOULDER: CPT

## 2025-02-20 PROCEDURE — 83605 ASSAY OF LACTIC ACID: CPT | Performed by: EMERGENCY MEDICINE

## 2025-02-20 PROCEDURE — 36415 COLL VENOUS BLD VENIPUNCTURE: CPT | Performed by: EMERGENCY MEDICINE

## 2025-02-20 PROCEDURE — 70450 CT HEAD/BRAIN W/O DYE: CPT

## 2025-02-20 PROCEDURE — 96361 HYDRATE IV INFUSION ADD-ON: CPT

## 2025-02-20 PROCEDURE — 85025 COMPLETE CBC W/AUTO DIFF WBC: CPT | Performed by: EMERGENCY MEDICINE

## 2025-02-20 PROCEDURE — 84484 ASSAY OF TROPONIN QUANT: CPT | Performed by: EMERGENCY MEDICINE

## 2025-02-20 RX ORDER — KETOROLAC TROMETHAMINE 30 MG/ML
15 INJECTION, SOLUTION INTRAMUSCULAR; INTRAVENOUS ONCE
Status: COMPLETED | OUTPATIENT
Start: 2025-02-20 | End: 2025-02-20

## 2025-02-20 RX ORDER — ALBUTEROL SULFATE 0.83 MG/ML
2.5 SOLUTION RESPIRATORY (INHALATION) EVERY 6 HOURS PRN
Qty: 75 ML | Refills: 0 | Status: SHIPPED | OUTPATIENT
Start: 2025-02-20

## 2025-02-20 RX ORDER — PREDNISONE 20 MG/1
40 TABLET ORAL DAILY
Qty: 10 TABLET | Refills: 0 | Status: SHIPPED | OUTPATIENT
Start: 2025-02-20 | End: 2025-02-25

## 2025-02-20 RX ORDER — IPRATROPIUM BROMIDE AND ALBUTEROL SULFATE 2.5; .5 MG/3ML; MG/3ML
3 SOLUTION RESPIRATORY (INHALATION) ONCE
Status: COMPLETED | OUTPATIENT
Start: 2025-02-20 | End: 2025-02-20

## 2025-02-20 RX ORDER — AZITHROMYCIN 250 MG/1
TABLET, FILM COATED ORAL
Qty: 6 TABLET | Refills: 0 | Status: SHIPPED | OUTPATIENT
Start: 2025-02-20 | End: 2025-02-24

## 2025-02-20 RX ADMIN — KETOROLAC TROMETHAMINE 15 MG: 30 INJECTION, SOLUTION INTRAMUSCULAR; INTRAVENOUS at 12:30

## 2025-02-20 RX ADMIN — IPRATROPIUM BROMIDE AND ALBUTEROL SULFATE 3 ML: 2.5; .5 SOLUTION RESPIRATORY (INHALATION) at 12:32

## 2025-02-20 RX ADMIN — SODIUM CHLORIDE 1000 ML: 0.9 INJECTION, SOLUTION INTRAVENOUS at 12:19

## 2025-02-20 NOTE — TELEPHONE ENCOUNTER
Spoke with patient's spouse regarding results of chest xray.   Reviewed recommendations re nebulizer, azithromycin, and use of previously prescribed tamiflu.

## 2025-02-20 NOTE — ED PROVIDER NOTES
Time reflects when diagnosis was documented in both MDM as applicable and the Disposition within this note       Time User Action Codes Description Comment    2/20/2025  2:18 PM Sandra Brink Add [E86.0] Dehydration     2/20/2025  2:18 PM Sandra Brink Add [R55] Syncope and collapse     2/20/2025  2:18 PM Sandra Brink Add [J11.1] Influenza     2/20/2025  2:21 PM Sandra Brink [J20.9] Complicated acute bronchitis           ED Disposition       ED Disposition   Discharge    Condition   Stable    Date/Time   Thu Feb 20, 2025  2:18 PM    Comment   Ahsan Ba Jr. discharge to home/self care.                   Assessment & Plan       Medical Decision Making  Patient is a 50-year-old male recently diagnosed with influenza B who presents to the emergency department after a syncopal event.  Episode appears to be orthostatic in nature in the setting of acute illness as he states that he got up to go to the bathroom, and had a syncopal event while in the bathroom.  Believes that he did hit his head and strike his right shoulder based on how he landed.  Also reports cough and shortness of breath are worsening in the setting of known flu.  End expiratory cough exhibited on exam.  Some concern for element of reactive airway disease versus bronchitis.  Pneumonia remains on the differential as well.  Low suspicion for cardiogenic syncope.  Patient nontoxic-appearing.  Was mildly tachycardic in triage, consistent with illness and likely dehydration.  Plan to treat symptomatically while awaiting laboratory studies and imaging.    Amount and/or Complexity of Data Reviewed  Labs: ordered. Decision-making details documented in ED Course.  Radiology: ordered.    Risk  Prescription drug management.  Decision regarding hospitalization.        ED Course as of 02/20/25 1952 Thu Feb 20, 2025   1100 POCT rapid flu A and B (2/17/25 1427)   1300 HS Troponin 0hr (reflex protocol)   1300 Lactic acid, plasma  (w/reflex if result > 2.0)   1300 CBC and differential(!)   1300 UA (URINE) with reflex to Scope(!)   1300 Comprehensive metabolic panel       Medications   sodium chloride 0.9 % bolus 1,000 mL (0 mL Intravenous Stopped 2/20/25 1433)   ketorolac (TORADOL) injection 15 mg (15 mg Intravenous Given 2/20/25 1230)   ipratropium-albuterol (DUO-NEB) 0.5-2.5 mg/3 mL inhalation solution 3 mL (3 mL Nebulization Given 2/20/25 1232)       ED Risk Strat Scores                            SBIRT 20yo+      Flowsheet Row Most Recent Value   Initial Alcohol Screen: US AUDIT-C     1. How often do you have a drink containing alcohol? 0 Filed at: 02/20/2025 1421   2. How many drinks containing alcohol do you have on a typical day you are drinking?  0 Filed at: 02/20/2025 1421   3a. Male UNDER 65: How often do you have five or more drinks on one occasion? 0 Filed at: 02/20/2025 1421   Audit-C Score 0 Filed at: 02/20/2025 1421   JUNIOR: How many times in the past year have you...    Used an illegal drug or used a prescription medication for non-medical reasons? Never Filed at: 02/20/2025 1421                            History of Present Illness       Chief Complaint   Patient presents with    Syncope     Diagnosed with Flu B 2 days ago. States that this morning while going to the bathroom, passed out on the floor. Remembers standing to go to the bathroom, but woke up on bathroom floor. Wife found patient on the right side, complaints of right shoulder pain. Producing yellow/green sputum when coughing.        Past Medical History:   Diagnosis Date    Allergic     Anxiety     Back pain     BMI 31.0-31.9,adult 04/25/2019    Class 1 obesity due to excess calories with serious comorbidity and body mass index (BMI) of 31.0 to 31.9 in adult 12/09/2021    GERD (gastroesophageal reflux disease)     Hypertension     Liver disease       Past Surgical History:   Procedure Laterality Date    APPENDECTOMY      CHOLECYSTECTOMY      HERNIA REPAIR       inguinal/umbilical - last assessed 6/29/16    CT EXC B9 LESION MRGN XCP SK TG T/A/L 1.1-2.0 CM Right 1/20/2016    Procedure: REVISION SCAR ABDOMINAL Exploration of painful scar on right upper abdomen;  Surgeon: Arturo Waters MD;  Location: [facility];  Service: General    CT REPAIR FIRST ABDOMINAL WALL HERNIA N/A 1/20/2016    Procedure: REPAIR HERNIA VENTRAL (SMALL EPIGASTRIC HERNIA);  Surgeon: Arturo Waters MD;  Location: [facility];  Service: General      Family History   Problem Relation Age of Onset    Cancer Mother         lung    Hypertension Mother     Arthritis Mother     Hypertension Father     Hiatal hernia Father     Arthritis Father     Anxiety disorder Father     Cancer Maternal Uncle       Social History     Tobacco Use    Smoking status: Never    Smokeless tobacco: Never   Vaping Use    Vaping status: Never Used   Substance Use Topics    Alcohol use: No    Drug use: No      E-Cigarette/Vaping    E-Cigarette Use Never User       E-Cigarette/Vaping Substances    Nicotine No     THC No     CBD No     Flavoring No     Other No     Unknown No       I have reviewed and agree with the history as documented.     The patient is a 50 yoM who presents with sycnope in the setting of flu.           Review of Systems        Objective       ED Triage Vitals   Temperature Pulse Blood Pressure Respirations SpO2 Patient Position - Orthostatic VS   02/20/25 1039 02/20/25 1039 02/20/25 1039 02/20/25 1039 02/20/25 1039 02/20/25 1039   99 °F (37.2 °C) (!) 113 128/63 19 95 % Sitting      Temp Source Heart Rate Source BP Location FiO2 (%) Pain Score    02/20/25 1039 02/20/25 1039 02/20/25 1039 -- 02/20/25 1300    Oral Monitor Left arm  2      Vitals      Date and Time Temp Pulse SpO2 Resp BP Pain Score FACES Pain Rating User   02/20/25 1430 -- 83 99 % 20 106/60 No Pain -- NL   02/20/25 1400 -- 87 93 % 21 100/59 -- -- SB   02/20/25 1300 -- -- -- -- -- 2 -- NL   02/20/25 1230 -- 87 95 % 21 109/62 -- -- SB   02/20/25 1145 --  97 91 % 18 124/61 -- -- SB   02/20/25 1100 -- 101 92 % 19 119/68 -- -- SB   02/20/25 1039 99 °F (37.2 °C) 113 95 % 19 128/63 -- -- GP            Physical Exam  Vitals and nursing note reviewed.   Constitutional:       General: He is not in acute distress.     Appearance: Normal appearance.   HENT:      Head: Normocephalic and atraumatic.      Right Ear: External ear normal.      Left Ear: External ear normal.      Nose: Nose normal.   Cardiovascular:      Rate and Rhythm: Regular rhythm. Tachycardia present.   Pulmonary:      Effort: Pulmonary effort is normal.      Breath sounds: Wheezing and rales present.   Abdominal:      General: There is no distension.      Palpations: Abdomen is soft.      Tenderness: There is no abdominal tenderness.   Musculoskeletal:      Right lower leg: No edema.      Left lower leg: No edema.   Skin:     General: Skin is warm and dry.   Neurological:      General: No focal deficit present.      Mental Status: He is alert and oriented to person, place, and time. Mental status is at baseline.   Psychiatric:         Behavior: Behavior normal.         Results Reviewed       Procedure Component Value Units Date/Time    HS Troponin 0hr (reflex protocol) [[id number]]  (Normal) Collected: 02/20/25 1217    Lab Status: Final result Specimen: Blood from Arm, Right Updated: 02/20/25 1249     hs TnI 0hr <2 ng/L     Comprehensive metabolic panel [[id number]] Collected: 02/20/25 1217    Lab Status: Final result Specimen: Blood from Arm, Right Updated: 02/20/25 1243     Sodium 136 mmol/L      Potassium 4.2 mmol/L      Chloride 102 mmol/L      CO2 28 mmol/L      ANION GAP 6 mmol/L      BUN 13 mg/dL      Creatinine 1.25 mg/dL      Glucose 114 mg/dL      Calcium 8.9 mg/dL      AST 21 U/L      ALT 31 U/L      Alkaline Phosphatase 58 U/L      Total Protein 6.7 g/dL      Albumin 4.2 g/dL      Total Bilirubin 0.45 mg/dL      eGFR 67 ml/min/1.73sq m     Narrative:      [employer]  guidelines for Chronic Kidney Disease (CKD):     Stage 1 with normal or high GFR (GFR > 90 mL/min/1.73 square meters)    Stage 2 Mild CKD (GFR = 60-89 mL/min/1.73 square meters)    Stage 3A Moderate CKD (GFR = 45-59 mL/min/1.73 square meters)    Stage 3B Moderate CKD (GFR = 30-44 mL/min/1.73 square meters)    Stage 4 Severe CKD (GFR = 15-29 mL/min/1.73 square meters)    Stage 5 End Stage CKD (GFR <15 mL/min/1.73 square meters)  Note: GFR calculation is accurate only with a steady state creatinine    Lactic acid, plasma (w/reflex if result > 2.0) [[id number]]  (Normal) Collected: 02/20/25 1217    Lab Status: Final result Specimen: Blood from Arm, Right Updated: 02/20/25 1242     LACTIC ACID 1.8 mmol/L     Narrative:      Result may be elevated if tourniquet was used during collection.    Urine Microscopic [[id number]]  (Abnormal) Collected: 02/20/25 1217    Lab Status: Final result Specimen: Urine, Clean Catch Updated: 02/20/25 1233     RBC, UA 1-2 /hpf      WBC, UA 1-2 /hpf      Epithelial Cells None Seen /hpf      Bacteria, UA None Seen /hpf      MUCUS THREADS Occasional    UA (URINE) with reflex to Scope [[id number]]  (Abnormal) Collected: 02/20/25 1217    Lab Status: Final result Specimen: Urine, Clean Catch Updated: 02/20/25 1232     Color, UA Yellow     Clarity, UA Clear     Specific Gravity, UA 1.032     pH, UA 6.5     Leukocytes, UA Negative     Nitrite, UA Negative     Protein, UA Trace mg/dl      Glucose, UA Negative mg/dl      Ketones, UA Negative mg/dl      Urobilinogen, UA <2.0 mg/dl      Bilirubin, UA Negative     Occult Blood, UA Negative    CBC and differential [[id number]]  (Abnormal) Collected: 02/20/25 1217    Lab Status: Final result Specimen: Blood from Arm, Right Updated: 02/20/25 1227     WBC 16.00 Thousand/uL      RBC 5.65 Million/uL      Hemoglobin 15.0 g/dL      Hematocrit 46.5 %      MCV 82 fL      MCH 26.5 pg      MCHC 32.3 g/dL      RDW 13.3 %      MPV 9.3 fL      Platelets 256 Thousands/uL       nRBC 0 /100 WBCs      Segmented % 83 %      Immature Grans % 0 %      Lymphocytes % 10 %      Monocytes % 7 %      Eosinophils Relative 0 %      Basophils Relative 0 %      Absolute Neutrophils 13.26 Thousands/µL      Absolute Immature Grans 0.06 Thousand/uL      Absolute Lymphocytes 1.53 Thousands/µL      Absolute Monocytes 1.10 Thousand/µL      Eosinophils Absolute 0.02 Thousand/µL      Basophils Absolute 0.03 Thousands/µL             CT head without contrast   Final Interpretation by Pallav N Shah, MD (02/20 1147)      No acute intracranial abnormality.                  Workstation performed: [id number]         XR chest 1 view portable   Final Interpretation by Kelly Carrillo MD (02/20 1518)      Mild opacity in the left base and subtle opacity in the right midlung, which could be due to pneumonia given the history.      The study was marked in EPIC for immediate notification.            Workstation performed: [id number]         XR shoulder 2+ views RIGHT   Final Interpretation by Zack Duong MD (02/20 0189)      No acute osseous abnormality.         Computerized Assisted Algorithm (CAA) may have been used to analyze all applicable images.         Workstation performed: [id number]             Procedures    ED Medication and Procedure Management   Prior to Admission Medications   Prescriptions Last Dose Informant Patient Reported? Taking?   ALPRAZolam (XANAX) 0.25 mg tablet   No No   Sig: Take 1 tablet (0.25 mg total) by mouth daily as needed for anxiety   Azelastine HCl 137 MCG/SPRAY SOLN   No No   Sig: instill 1 spray into each nostril twice a day as directed   albuterol (Proventil HFA) 90 mcg/act inhaler   No No   Sig: Inhale 2 puffs every 6 (six) hours as needed for wheezing   benzonatate (TESSALON) 200 MG capsule   No No   Sig: Take 1 capsule (200 mg total) by mouth 3 (three) times a day as needed for cough   dicyclomine (BENTYL) 20 mg tablet   No No   Sig: take 1 tablet by mouth  four times a day before meals and at bedtime   fexofenadine-pseudoephedrine (ALLEGRA-D)  MG per tablet  Self Yes No   Sig: Take 1 tablet by mouth daily   fluticasone (FLONASE) 50 mcg/act nasal spray   Yes No   Si spray into each nostril daily   hyoscyamine (ANASPAZ,LEVSIN) 0.125 MG tablet   No No   Sig: Take 1 tablet (0.125 mg total) by mouth every 4 (four) hours as needed for cramping   losartan (COZAAR) 50 mg tablet   No No   Sig: Take 1 tablet (50 mg total) by mouth daily   ondansetron (ZOFRAN-ODT) 4 mg disintegrating tablet   No No   Sig: Take 1 tablet (4 mg total) by mouth every 6 (six) hours as needed for nausea or vomiting   oseltamivir (TAMIFLU) 75 mg capsule   No No   Sig: Take 1 capsule (75 mg total) by mouth every 12 (twelve) hours for 5 days   pantoprazole (PROTONIX) 40 mg tablet   No No   Sig: Take 1 tablet (40 mg total) by mouth daily   sertraline (ZOLOFT) 25 mg tablet   No No   Sig: take 1 tablet by mouth once daily      Facility-Administered Medications: None     Discharge Medication List as of 2025  2:22 PM        START taking these medications    Details   albuterol (2.5 mg/3 mL) 0.083 % nebulizer solution Take 3 mL (2.5 mg total) by nebulization every 6 (six) hours as needed for wheezing or shortness of breath, Starting 2025, Normal      azithromycin (ZITHROMAX) 250 mg tablet Take 2 tablets today then 1 tablet daily x 4 days, Normal      predniSONE 20 mg tablet Take 2 tablets (40 mg total) by mouth daily for 5 days, Starting 2025, Until 2025, Normal           CONTINUE these medications which have NOT CHANGED    Details   albuterol (Proventil HFA) 90 mcg/act inhaler Inhale 2 puffs every 6 (six) hours as needed for wheezing, Starting Mon 2025, Normal      ALPRAZolam (XANAX) 0.25 mg tablet Take 1 tablet (0.25 mg total) by mouth daily as needed for anxiety, Starting 2024, Normal      Azelastine HCl 137 MCG/SPRAY SOLN instill 1 spray into each  nostril twice a day as directed, Starting Wed 1/15/2025, Normal      benzonatate (TESSALON) 200 MG capsule Take 1 capsule (200 mg total) by mouth 3 (three) times a day as needed for cough, Starting Mon 2/17/2025, Normal      dicyclomine (BENTYL) 20 mg tablet take 1 tablet by mouth four times a day before meals and at bedtime, Normal      fexofenadine-pseudoephedrine (ALLEGRA-D)  MG per tablet Take 1 tablet by mouth daily, Historical Med      fluticasone (FLONASE) 50 mcg/act nasal spray 1 spray into each nostril daily, Historical Med      hyoscyamine (ANASPAZ,LEVSIN) 0.125 MG tablet Take 1 tablet (0.125 mg total) by mouth every 4 (four) hours as needed for cramping, Starting Mon 2/17/2025, Normal      losartan (COZAAR) 50 mg tablet Take 1 tablet (50 mg total) by mouth daily, Starting Fri 11/8/2024, Normal      ondansetron (ZOFRAN-ODT) 4 mg disintegrating tablet Take 1 tablet (4 mg total) by mouth every 6 (six) hours as needed for nausea or vomiting, Starting Tue 1/9/2024, Normal      oseltamivir (TAMIFLU) 75 mg capsule Take 1 capsule (75 mg total) by mouth every 12 (twelve) hours for 5 days, Starting Mon 2/17/2025, Until Sat 2/22/2025, Normal      pantoprazole (PROTONIX) 40 mg tablet Take 1 tablet (40 mg total) by mouth daily, Starting Fri 5/3/2024, Normal      sertraline (ZOLOFT) 25 mg tablet take 1 tablet by mouth once daily, Starting Thu 11/14/2024, Normal           No discharge procedures on file.  ED SEPSIS DOCUMENTATION   Time reflects when diagnosis was documented in both MDM as applicable and the Disposition within this note       Time User Action Codes Description Comment    2/20/2025  2:18 PM Sandra Brink Add [E86.0] Dehydration     2/20/2025  2:18 PM Sandra Brink Add [R55] Syncope and collapse     2/20/2025  2:18 PM Sandra Brink Add [J11.1] Influenza     2/20/2025  2:21 PM Sandra Brink Add [J20.9] Complicated acute bronchitis                  Sandra Brink,  MD  02/20/25 1954

## 2025-02-20 NOTE — TELEPHONE ENCOUNTER
Pt wife called in stating that pt is not doing well since OV on 2/17 for flu B. Pt continues with fever of 102, severe cough, congestion, fatigue. States that this morning he passed out in the bathroom. Pt has been taking tamiflu, benzonatate and albuterol. Wife states that he sounds like he has gargling in his lungs. Wife used home pulse ox. HR 88 and spO2 ranges from 89-95%. RN advised the concern of oxygen being low and advised ED. Wife understands, unsure if pt will be willing to go. Please advise.

## 2025-02-28 ENCOUNTER — TELEPHONE (OUTPATIENT)
Age: 50
End: 2025-02-28

## 2025-02-28 ENCOUNTER — OFFICE VISIT (OUTPATIENT)
Dept: FAMILY MEDICINE CLINIC | Facility: MEDICAL CENTER | Age: 50
End: 2025-02-28
Payer: COMMERCIAL

## 2025-02-28 ENCOUNTER — NURSE TRIAGE (OUTPATIENT)
Age: 50
End: 2025-02-28

## 2025-02-28 VITALS
WEIGHT: 230 LBS | DIASTOLIC BLOOD PRESSURE: 86 MMHG | RESPIRATION RATE: 17 BRPM | BODY MASS INDEX: 32.93 KG/M2 | HEIGHT: 70 IN | TEMPERATURE: 98.2 F | HEART RATE: 85 BPM | SYSTOLIC BLOOD PRESSURE: 122 MMHG | OXYGEN SATURATION: 99 %

## 2025-02-28 DIAGNOSIS — I45.10 RIGHT BUNDLE BRANCH BLOCK: ICD-10-CM

## 2025-02-28 DIAGNOSIS — J20.9 COMPLICATED ACUTE BRONCHITIS: ICD-10-CM

## 2025-02-28 DIAGNOSIS — R05.2 SUBACUTE COUGH: Primary | ICD-10-CM

## 2025-02-28 PROCEDURE — 99213 OFFICE O/P EST LOW 20 MIN: CPT

## 2025-02-28 RX ORDER — ALBUTEROL SULFATE 0.83 MG/ML
2.5 SOLUTION RESPIRATORY (INHALATION) EVERY 6 HOURS PRN
Qty: 75 ML | Refills: 1 | Status: SHIPPED | OUTPATIENT
Start: 2025-02-28

## 2025-02-28 RX ORDER — BUDESONIDE 90 UG/1
1 AEROSOL, POWDER RESPIRATORY (INHALATION) 2 TIMES DAILY
Qty: 1 EACH | Refills: 0 | Status: SHIPPED | OUTPATIENT
Start: 2025-02-28 | End: 2025-02-28

## 2025-02-28 RX ORDER — DEXTROMETHORPHAN HYDROBROMIDE AND PROMETHAZINE HYDROCHLORIDE 15; 6.25 MG/5ML; MG/5ML
5 SYRUP ORAL
Qty: 118 ML | Refills: 0 | Status: SHIPPED | OUTPATIENT
Start: 2025-02-28

## 2025-02-28 RX ORDER — BECLOMETHASONE DIPROPIONATE HFA 80 UG/1
2 AEROSOL, METERED RESPIRATORY (INHALATION) 2 TIMES DAILY
Qty: 10.6 G | Refills: 0 | Status: SHIPPED | OUTPATIENT
Start: 2025-02-28

## 2025-02-28 RX ORDER — BECLOMETHASONE DIPROPIONATE HFA 40 UG/1
2 AEROSOL, METERED RESPIRATORY (INHALATION) 2 TIMES DAILY
Qty: 10.6 G | Refills: 0 | Status: SHIPPED | OUTPATIENT
Start: 2025-02-28 | End: 2025-02-28

## 2025-02-28 NOTE — TELEPHONE ENCOUNTER
"Wife called in with concerns of Pt's ongoing cough since 2/15. Has been treated with antibiotic, steroid and nebs and completed them.  Gets SOB when coughing and had rib pain from the cough. SpO2 is 96% on RA.  Denies fever.  Made office appointment today.      Reason for Disposition   Continuous (nonstop) coughing    Answer Assessment - Initial Assessment Questions  1. RESPIRATORY STATUS: \"Describe your breathing?\" (e.g., wheezing, shortness of breath, unable to speak, severe coughing)       SOB,   2. ONSET: \"When did this breathing problem begin?\"       2/15  3. PATTERN \"Does the difficult breathing come and go, or has it been constant since it started?\"       Come and goes with cough  4. SEVERITY: \"How bad is your breathing?\" (e.g., mild, moderate, severe)       moderate  5. RECURRENT SYMPTOM: \"Have you had difficulty breathing before?\" If Yes, ask: \"When was the last time?\" and \"What happened that time?\"       Yes, has been on prednisone, antibiotics  6. CARDIAC HISTORY: \"Do you have any history of heart disease?\" (e.g., heart attack, angina, bypass surgery, angioplasty)       HTN  7. LUNG HISTORY: \"Do you have any history of lung disease?\"  (e.g., pulmonary embolus, asthma, emphysema)      no  8. CAUSE: \"What do you think is causing the breathing problem?\"       flu  9. OTHER SYMPTOMS: \"Do you have any other symptoms?\" (e.g., chest pain, cough, dizziness, fever, runny nose)      Rib pain with coughing, cough  10. O2 SATURATION MONITOR:  \"Do you use an oxygen saturation monitor (pulse oximeter) at home?\" If Yes, ask: \"What is your reading (oxygen level) today?\" \"What is your usual oxygen saturation reading?\" (e.g., 95%)        96% on room air    Protocols used: Breathing Difficulty-Adult-[place]    "

## 2025-02-28 NOTE — PROGRESS NOTES
Name: Ahsan Ba Jr.      : 1975      MRN: [id number]  Encounter Provider: DIAMOND Wilson  Encounter Date: 2025   Encounter department: [facility] [place]  :  Assessment & Plan  Subacute cough  Continue albuterol inhaler and nebulizer as needed.  Start Pulmicort inhaler twice daily as directed.  May use promethazine cough syrup as directed below.  Advised this medication may cause drowsiness and to reserve for evening and nighttime hours.  Orders:    budesonide (Pulmicort Flexhaler) 90 MCG/ACT inhaler; Inhale 1 puff 2 (two) times a day Rinse mouth after use.    promethazine-dextromethorphan (PHENERGAN-DM) 6.25-15 mg/5 mL oral syrup; Take 5 mL by mouth daily at bedtime as needed for cough    Right bundle branch block  Found on EKG in the emergency department setting on .  Patient is asymptomatic.  Follow-up with cardiology.  Orders:    Ambulatory Referral to Cardiology; Future           History of Present Illness   50-year-old patient of Dr. Neves presents with complaints of persistent cough with clear sputum x 2 weeks. Cough is keeping him up at night. He is still using albuterol inhaler and nebulizer a few times per day. No longer having fever or any other associated symptoms.   He was evaluated here in office by Dr. Shah on , diagnosed with influenza B and prescribed Tamiflu and Tessalon Perles at that time.  He then was seen in the emergency department on  after having a syncopal episode and diagnosed with pneumonia at that time, he was prescribed course of azithromycin, prednisone and albuterol inhaler.  He has completed course of azithromycin and prednisone and is still using albuterol as needed.  His wife goes on to mention that he had an abnormal EKG finding in the emergency department when he was seen on .  After review, it looks as though EKG showed a right bundle branch block with no prior EKG for comparison.  Patient does not offer any concerns  "of chest pain at this time.  She would like a referral to cardiology for further evaluation of this finding.  No other concerns at this time.    Cough      Review of Systems   Constitutional: Negative.    HENT: Negative.     Eyes: Negative.    Respiratory:  Positive for cough.    Cardiovascular: Negative.    Gastrointestinal: Negative.    Endocrine: Negative.    Genitourinary: Negative.    Musculoskeletal: Negative.    Skin: Negative.    Allergic/Immunologic: Negative.    Neurological: Negative.    Hematological: Negative.    Psychiatric/Behavioral: Negative.         Objective   /86 (BP Location: Left arm, Patient Position: Sitting, Cuff Size: Large)   Pulse 85   Temp 98.2 °F (36.8 °C) (Temporal)   Resp 17   Ht 5' 10\" (1.778 m)   Wt 104 kg (230 lb)   SpO2 99%   BMI 33.00 kg/m²      Physical Exam  Vitals and nursing note reviewed.   Constitutional:       General: He is not in acute distress.     Appearance: Normal appearance. He is obese. He is not ill-appearing.   HENT:      Head: Normocephalic and atraumatic.      Right Ear: Tympanic membrane and ear canal normal.      Left Ear: Tympanic membrane and ear canal normal.      Nose: Nose normal.      Mouth/Throat:      Mouth: Mucous membranes are moist.      Pharynx: Oropharynx is clear.   Cardiovascular:      Rate and Rhythm: Normal rate and regular rhythm.      Pulses: Normal pulses.      Heart sounds: Normal heart sounds.   Pulmonary:      Effort: Pulmonary effort is normal.      Breath sounds: No wheezing, rhonchi or rales.      Comments: Slightly diminished breath sounds lower bases b/l, right >left  Skin:     General: Skin is warm and dry.   Neurological:      General: No focal deficit present.      Mental Status: He is alert and oriented to person, place, and time. Mental status is at baseline.       "

## 2025-02-28 NOTE — TELEPHONE ENCOUNTER
Courtney from [facility] in [place] called.  They do not have Qvar 40 in stock.  They have Qvar 80, Advair, Breo, Symbicort, Arnuity.  Wife is stating she wants something today.  She does not want pt to have to wait until Monday.  Courtney said if Keesha FIELDS would want to call her to talk about her options, please call [phone].

## 2025-02-28 NOTE — TELEPHONE ENCOUNTER
Pts wife called in while at the pharmacy stating they were out of stock of the one medication and was looking for an alternative. Was able to transfer call to Aurea to further help the pt.

## 2025-03-05 ENCOUNTER — OFFICE VISIT (OUTPATIENT)
Dept: CARDIOLOGY CLINIC | Facility: MEDICAL CENTER | Age: 50
End: 2025-03-05
Payer: COMMERCIAL

## 2025-03-05 ENCOUNTER — APPOINTMENT (OUTPATIENT)
Dept: LAB | Facility: MEDICAL CENTER | Age: 50
End: 2025-03-05
Payer: COMMERCIAL

## 2025-03-05 ENCOUNTER — OFFICE VISIT (OUTPATIENT)
Dept: FAMILY MEDICINE CLINIC | Facility: MEDICAL CENTER | Age: 50
End: 2025-03-05
Payer: COMMERCIAL

## 2025-03-05 VITALS
HEIGHT: 70 IN | OXYGEN SATURATION: 97 % | SYSTOLIC BLOOD PRESSURE: 120 MMHG | RESPIRATION RATE: 18 BRPM | WEIGHT: 230.2 LBS | TEMPERATURE: 97.7 F | BODY MASS INDEX: 32.96 KG/M2 | DIASTOLIC BLOOD PRESSURE: 86 MMHG | HEART RATE: 77 BPM

## 2025-03-05 VITALS
DIASTOLIC BLOOD PRESSURE: 82 MMHG | SYSTOLIC BLOOD PRESSURE: 126 MMHG | BODY MASS INDEX: 32.78 KG/M2 | HEART RATE: 84 BPM | OXYGEN SATURATION: 95 % | HEIGHT: 70 IN | WEIGHT: 229 LBS

## 2025-03-05 DIAGNOSIS — Z00.00 ANNUAL PHYSICAL EXAM: Primary | ICD-10-CM

## 2025-03-05 DIAGNOSIS — R53.82 CHRONIC FATIGUE: ICD-10-CM

## 2025-03-05 DIAGNOSIS — R25.2 LEG CRAMP: ICD-10-CM

## 2025-03-05 DIAGNOSIS — G47.33 OBSTRUCTIVE SLEEP APNEA SYNDROME: ICD-10-CM

## 2025-03-05 DIAGNOSIS — R55 VASOVAGAL SYNCOPE: ICD-10-CM

## 2025-03-05 DIAGNOSIS — I45.10 RIGHT BUNDLE BRANCH BLOCK: ICD-10-CM

## 2025-03-05 DIAGNOSIS — R94.31 ABNORMAL ELECTROCARDIOGRAM (ECG) (EKG): Primary | ICD-10-CM

## 2025-03-05 DIAGNOSIS — R73.03 PREDIABETES: ICD-10-CM

## 2025-03-05 DIAGNOSIS — I10 ESSENTIAL HYPERTENSION: ICD-10-CM

## 2025-03-05 DIAGNOSIS — R06.09 DYSPNEA ON EXERTION: ICD-10-CM

## 2025-03-05 DIAGNOSIS — E78.2 MIXED HYPERLIPIDEMIA: ICD-10-CM

## 2025-03-05 LAB
BASOPHILS # BLD AUTO: 0.09 THOUSANDS/ÂΜL (ref 0–0.1)
BASOPHILS NFR BLD AUTO: 1 % (ref 0–1)
EOSINOPHIL # BLD AUTO: 0.25 THOUSAND/ÂΜL (ref 0–0.61)
EOSINOPHIL NFR BLD AUTO: 2 % (ref 0–6)
ERYTHROCYTE [DISTWIDTH] IN BLOOD BY AUTOMATED COUNT: 13.5 % (ref 11.6–15.1)
FERRITIN SERPL-MCNC: 207 NG/ML (ref 24–336)
HCT VFR BLD AUTO: 46.8 % (ref 36.5–49.3)
HGB BLD-MCNC: 14.9 G/DL (ref 12–17)
IMM GRANULOCYTES # BLD AUTO: 0.05 THOUSAND/UL (ref 0–0.2)
IMM GRANULOCYTES NFR BLD AUTO: 0 % (ref 0–2)
IRON SATN MFR SERPL: 27 % (ref 15–50)
IRON SERPL-MCNC: 89 UG/DL (ref 50–212)
LYMPHOCYTES # BLD AUTO: 4 THOUSANDS/ÂΜL (ref 0.6–4.47)
LYMPHOCYTES NFR BLD AUTO: 30 % (ref 14–44)
MCH RBC QN AUTO: 26.8 PG (ref 26.8–34.3)
MCHC RBC AUTO-ENTMCNC: 31.8 G/DL (ref 31.4–37.4)
MCV RBC AUTO: 84 FL (ref 82–98)
MONOCYTES # BLD AUTO: 0.88 THOUSAND/ÂΜL (ref 0.17–1.22)
MONOCYTES NFR BLD AUTO: 7 % (ref 4–12)
NEUTROPHILS # BLD AUTO: 8.04 THOUSANDS/ÂΜL (ref 1.85–7.62)
NEUTS SEG NFR BLD AUTO: 60 % (ref 43–75)
NRBC BLD AUTO-RTO: 0 /100 WBCS
PLATELET # BLD AUTO: 459 THOUSANDS/UL (ref 149–390)
PMV BLD AUTO: 9.2 FL (ref 8.9–12.7)
RBC # BLD AUTO: 5.56 MILLION/UL (ref 3.88–5.62)
TIBC SERPL-MCNC: 329 UG/DL (ref 250–450)
TRANSFERRIN SERPL-MCNC: 235 MG/DL (ref 203–362)
TSH SERPL DL<=0.05 MIU/L-ACNC: 1.2 UIU/ML (ref 0.45–4.5)
UIBC SERPL-MCNC: 240 UG/DL (ref 155–355)
WBC # BLD AUTO: 13.31 THOUSAND/UL (ref 4.31–10.16)

## 2025-03-05 PROCEDURE — 36415 COLL VENOUS BLD VENIPUNCTURE: CPT

## 2025-03-05 PROCEDURE — 84443 ASSAY THYROID STIM HORMONE: CPT

## 2025-03-05 PROCEDURE — 85025 COMPLETE CBC W/AUTO DIFF WBC: CPT

## 2025-03-05 PROCEDURE — 99213 OFFICE O/P EST LOW 20 MIN: CPT | Performed by: STUDENT IN AN ORGANIZED HEALTH CARE EDUCATION/TRAINING PROGRAM

## 2025-03-05 PROCEDURE — 99396 PREV VISIT EST AGE 40-64: CPT | Performed by: STUDENT IN AN ORGANIZED HEALTH CARE EDUCATION/TRAINING PROGRAM

## 2025-03-05 PROCEDURE — 83550 IRON BINDING TEST: CPT

## 2025-03-05 PROCEDURE — 82728 ASSAY OF FERRITIN: CPT

## 2025-03-05 PROCEDURE — 83540 ASSAY OF IRON: CPT

## 2025-03-05 PROCEDURE — 99205 OFFICE O/P NEW HI 60 MIN: CPT | Performed by: INTERNAL MEDICINE

## 2025-03-05 NOTE — PROGRESS NOTES
[facility] [place] [address] [facility]  [address]  [facility] [address]  Phone#  [phone]  Fax#  [phone]  [facility]'s Cardiology Office Consultation             NAME: Ahsan Ba Jr.  AGE: 50 y.o. SEX: male   : 1975   MRN: [id number]    DATE: 3/5/2025  TIME: 11:58 AM    Cardiology Problem list:  Hypertension  Dyslipidemia  Obesity  Abnormal EKG: Sinus tachycardia, IRBBB  Fam Hx of AF  Probable sleep apnea      Assessment & Plan  Abnormal electrocardiogram (ECG) (EKG)  Recent EKG from the emergency room reviewed.  This revealed sinus tachycardia, right axis deviation with incomplete right bundle branch block.  Findings discussed with the patient.  Prognostic implications reviewed.  ASCVD risk reviewed.  Findings of dyslipidemia reviewed: HDL is low.  LDL is normal.  Triglycerides are elevated.  Liver function studies were normal.  Hemoglobin A1c is in the prediabetic range.  Further risk stratification can be considered with coronary artery calcium scoring and exercise treadmill stress testing.    Orders:    Stress test only, exercise; Future    CT coronary calcium score; Future    Mixed hyperlipidemia  Lipids from 2024 personally reviewed.  HDL is low at 28.  Triglycerides elevated at 230.  LDL normal at 73.  Repeat fasting lipids advised.  Lifestyle and dietary modification recommended.  Consider coronary artery calcium scoring to assess need for lipid-lowering therapy.  LFTs are normal.         Essential hypertension  BP Readings from Last 3 Encounters:   25 126/82   25 122/86   25 106/60   Continue lifestyle modification.   Medical therapy reviewed-continue losartan 50 mg daily.  Low-sodium diet and avoidance of alcohol..  Close blood pressure monitoring.           Right bundle branch block  EKG reviewed.  Exercise treadmill stress test and calcium score recommended.  Orders:    Ambulatory Referral to Cardiology    Stress test only, exercise;  Future    Obstructive sleep apnea syndrome  Reports of nocturnal snoring, sleep disturbance and probable apnea.  Referral made to pulmonary medicine for evaluation of sleep apnea, as requested by patient's spouse during this visit.  Echo will help evaluate the RV size and function.  Orders:    Ambulatory Referral to Pulmonology; Future    Dyspnea on exertion  Recent history of left lung pneumonia.  Chest x-ray reviewed.  Developed post pneumonia bronchitis with wheezing.  Still reports some exertional dyspnea accompanied by cough.  Had COVID infection twice and has had intermittent fatigue, dyspnea and brain fog concerning for long COVID.  No orthopnea or PND.  However dyspnea has been troublesome.  Echocardiogram requested to evaluate LV function, RV size and function (concerns of LBU).    Orders:    Echo complete w/ contrast if indicated; Future    Stress test only, exercise; Future    CT coronary calcium score; Future    Vasovagal syncope  Recent episode of vasovagal syncope and orthostatic hypotension in the setting of systemic illness.  No recurrence.         Prediabetes  He meets criteria for metabolic syndrome with the presence of low HDL, high triglycerides, BMI of 33, increased abdominal circumference and elevated hemoglobin A1c.  Prognostic implications reviewed.  Mediterranean style diet with low carbohydrate intake and regular exercise will be beneficial.  Coronary calcium score advised for further risk stratification.  If this is abnormal, he may benefit from additional medical therapy for his dyslipidemia and prediabetes.  He is scheduled to see Dr. Neves this afternoon for his regular physical.  Repeat labs will be planned after that visit.                HPI:    Ahsan SULLIVAN Mejia Phan is a 50 y.o.-year-old male who presents to the cardiology clinic for initial consultation.    He has been referred here for evaluation of an abnormal EKG noted during recent ER visit.  EKG reviewed personally, sinus  tachycardia with incomplete right bundle branch block noted.  ER visits reviewed.  He presented to the ER after an episode of orthostatic syncope in the setting of influenza B infection.  He suddenly got up to go to the bathroom and had a syncopal event while in the bathroom.  He continues to report significant cough and there was concerns he may have reactive airway disease or bronchitis.  Tachycardia was felt to be related to dehydration.  Labs reviewed white count was 16,000.  CMP, lactate and troponin were normal.  COVID-negative and flu B positive.  CT head negative for bleed or infarct.  Chest x-ray showed possible left basilar pneumonia on 2/20/2025.  Both studies personally reviewed.  Recent follow-up with primary care on 2/28/2025.  Refractory cough.  No further syncope.  Continues to report dyspnea.  No effort angina. Had COVID infection twice and has had intermittent fatigue, dyspnea and brain fog concerning for long COVID.  Continues to report some shortness of breath with and without exertion.  He states he remains active at work and walks about 8000-10,000 steps at a slow pace.  Does not have a regular exercise program.  Spouse reports that patient probably has sleep apnea, he snores and has disturbed sleep.  He reports fatigue.  No recurrence of syncope.      Past history, family history, social history, current medications, vital signs, recent lab and imaging studies and  prior cardiology studies reviewed independently on this visit.    Allergies   Allergen Reactions    Levaquin [Levofloxacin] GI Intolerance and Nausea Only    Rosuvastatin Myalgia       Current Outpatient Medications:     albuterol (2.5 mg/3 mL) 0.083 % nebulizer solution, Take 3 mL (2.5 mg total) by nebulization every 6 (six) hours as needed for wheezing or shortness of breath, Disp: 75 mL, Rfl: 1    albuterol (Proventil HFA) 90 mcg/act inhaler, Inhale 2 puffs every 6 (six) hours as needed for wheezing, Disp: 6.7 g, Rfl: 0     ALPRAZolam (XANAX) 0.25 mg tablet, Take 1 tablet (0.25 mg total) by mouth daily as needed for anxiety, Disp: 30 tablet, Rfl: 0    Azelastine HCl 137 MCG/SPRAY SOLN, instill 1 spray into each nostril twice a day as directed, Disp: 30 mL, Rfl: 1    beclomethasone (Qvar RediHaler) 80 MCG/ACT inhaler, Inhale 2 puffs 2 (two) times a day Rinse mouth after use., Disp: 10.6 g, Rfl: 0    dicyclomine (BENTYL) 20 mg tablet, take 1 tablet by mouth four times a day before meals and at bedtime, Disp: 360 tablet, Rfl: 1    fexofenadine-pseudoephedrine (ALLEGRA-D)  MG per tablet, Take 1 tablet by mouth daily, Disp: , Rfl:     fluticasone (FLONASE) 50 mcg/act nasal spray, 1 spray into each nostril daily, Disp: , Rfl:     hyoscyamine (ANASPAZ,LEVSIN) 0.125 MG tablet, Take 1 tablet (0.125 mg total) by mouth every 4 (four) hours as needed for cramping, Disp: 30 tablet, Rfl: 3    losartan (COZAAR) 50 mg tablet, Take 1 tablet (50 mg total) by mouth daily, Disp: 90 tablet, Rfl: 1    ondansetron (ZOFRAN-ODT) 4 mg disintegrating tablet, Take 1 tablet (4 mg total) by mouth every 6 (six) hours as needed for nausea or vomiting, Disp: 10 tablet, Rfl: 0    pantoprazole (PROTONIX) 40 mg tablet, Take 1 tablet (40 mg total) by mouth daily, Disp: 90 tablet, Rfl: 1    promethazine-dextromethorphan (PHENERGAN-DM) 6.25-15 mg/5 mL oral syrup, Take 5 mL by mouth daily at bedtime as needed for cough, Disp: 118 mL, Rfl: 0    sertraline (ZOLOFT) 25 mg tablet, take 1 tablet by mouth once daily, Disp: 90 tablet, Rfl: 1    Past Medical History:   Diagnosis Date    Allergic     Anxiety     Back pain     BMI 31.0-31.9,adult 04/25/2019    Class 1 obesity due to excess calories with serious comorbidity and body mass index (BMI) of 31.0 to 31.9 in adult 12/09/2021    GERD (gastroesophageal reflux disease)     Hypertension     Liver disease      Past Surgical History:   Procedure Laterality Date    APPENDECTOMY      CHOLECYSTECTOMY      HERNIA REPAIR       inguinal/umbilical - last assessed 6/29/16    TX EXC B9 LESION MRGN XCP SK TG T/A/L 1.1-2.0 CM Right 1/20/2016    Procedure: REVISION SCAR ABDOMINAL Exploration of painful scar on right upper abdomen;  Surgeon: Arturo Waters MD;  Location: [facility];  Service: General    TX REPAIR FIRST ABDOMINAL WALL HERNIA N/A 1/20/2016    Procedure: REPAIR HERNIA VENTRAL (SMALL EPIGASTRIC HERNIA);  Surgeon: Arturo Waters MD;  Location: [facility];  Service: General     Family History   Problem Relation Age of Onset    Cancer Mother         lung    Hypertension Mother     Arthritis Mother     Hypertension Father     Hiatal hernia Father     Arthritis Father     Anxiety disorder Father     Atrial fibrillation Father     Cancer Maternal Uncle      Social History   reports that he has never smoked. He has never used smokeless tobacco. He reports that he does not drink alcohol and does not use drugs.     Review of Systems   Constitutional:  Positive for fatigue.   HENT: Negative.     Eyes: Negative.    Respiratory:  Positive for cough and shortness of breath.    Cardiovascular:  Negative for chest pain, palpitations and leg swelling.   Gastrointestinal: Negative.    Endocrine: Negative.    Neurological:  Negative for syncope.   Hematological: Negative.    All other systems reviewed and are negative.      Objective:     Vitals:    03/05/25 1123   BP: 126/82   Pulse: 84   SpO2: 95%     Physical Exam  Vitals reviewed.   Constitutional:       General: He is not in acute distress.  HENT:      Head: Normocephalic.   Cardiovascular:      Rate and Rhythm: Normal rate and regular rhythm.      Heart sounds: No murmur heard.     No friction rub.   Pulmonary:      Effort: No tachypnea.      Breath sounds: Decreased air movement present. Examination of the left-lower field reveals rales. Rales present. No wheezing.   Musculoskeletal:         General: No swelling.      Right lower leg: No edema.      Left lower leg: No edema.   Skin:      "General: Skin is warm.   Neurological:      General: No focal deficit present.      Mental Status: He is alert.   Psychiatric:         Mood and Affect: Mood normal.         Pertinent Laboratory/Diagnostic Studies:    Laboratory studies reviewed personally by Jeffrey Florez MD    BMP:   Lab Results   Component Value Date    SODIUM 136 02/20/2025    K 4.2 02/20/2025     02/20/2025    CO2 28 02/20/2025    BUN 13 02/20/2025    CREATININE 1.25 02/20/2025    GLUC 114 02/20/2025    CALCIUM 8.9 02/20/2025     CBC:  Lab Results   Component Value Date    WBC 16.00 (H) 02/20/2025    HGB 15.0 02/20/2025    HCT 46.5 02/20/2025    MCV 82 02/20/2025     02/20/2025     Lipid Profile:   Lab Results   Component Value Date    HDL 28 (L) 01/29/2024     Lab Results   Component Value Date    LDLCALC 73 01/29/2024     Lab Results   Component Value Date    TRIG 230 (H) 01/29/2024      Other labs:  Lab Results   Component Value Date    YIN4UGAOBMAI 1.911 07/14/2023     Lab Results   Component Value Date    ALT 31 02/20/2025    AST 21 02/20/2025     Lab Results   Component Value Date    HGBA1C 5.8 (H) 01/29/2024       Imaging Studies:     Pertinent cardiac studies and imaging studies were personally reviewed on this visit and results summarized.    I have spent a total time of 55  minutes in caring for this patient on the day of the visit/encounter including reviewing and entering of medical history, physical examination, diagnostic results, instructions for management, patient education, risk factor reduction, documenting in the medical record, sending communications to other providers, reviewing/ordering tests, medicine, procedures etc.    Portions of the record may have been created with voice recognition software.  Occasional wrong word or \"sound alike\" substitutions may have occurred due to the inherent limitations of voice recognition software.  Read the chart carefully and recognize, using context, where substitutions have " occurred. Please reach out to me directly for any clarifications.

## 2025-03-05 NOTE — PATIENT INSTRUCTIONS
Echocardiogram planned to evaluate heart function and rule out significant valvular heart disease.  Stress testing planned for further cardiac risk assessment.    Cardiac CT Calcium Score:  A CT calcium score exam, also known as a coronary calcium scan, is a quick, convenient and noninvasive way of evaluating the amount of calcified (hard) plaque in your heart vessels. The level of calcium equates to the extent of plaque build-up in your arteries. Plaque in the arteries can cause heart attacks.    The radiologist reads the images and sends your cardiologist a report with a calcium score. Patients with higher scores have a greater risk for a heart attack, heart disease or stroke. Knowing your score can help us decide on blood pressure and cholesterol goals that will minimize your risk as much as possible.    The American College of Cardiology found that Coronary artery calcification (CAC) is an excellent cardiovascular disease risk marker and can help guide the decision to use cholesterol reducing medications such as statins. A negative calcium score may reduce the need for statins in otherwise eligible patients. Knowing your score could save your life.    The exam takes less than 10 minutes, is painless and does not require any IV or oral contrast. The exam is typically not covered by insurance. The fee at [facility] radiology locations is $99.    Results sent to patient via [employer].

## 2025-03-05 NOTE — ASSESSMENT & PLAN NOTE
Lipids from 1/29/2024 personally reviewed.  HDL is low at 28.  Triglycerides elevated at 230.  LDL normal at 73.  Repeat fasting lipids advised.  Lifestyle and dietary modification recommended.  Consider coronary artery calcium scoring to assess need for lipid-lowering therapy.  LFTs are normal.

## 2025-03-05 NOTE — ASSESSMENT & PLAN NOTE
Recent EKG from the emergency room reviewed.  This revealed sinus tachycardia, right axis deviation with incomplete right bundle branch block.  Findings discussed with the patient.  Prognostic implications reviewed.  ASCVD risk reviewed.  Findings of dyslipidemia reviewed: HDL is low.  LDL is normal.  Triglycerides are elevated.  Liver function studies were normal.  Hemoglobin A1c is in the prediabetic range.  Further risk stratification can be considered with coronary artery calcium scoring and exercise treadmill stress testing.    Orders:    Stress test only, exercise; Future    CT coronary calcium score; Future

## 2025-03-05 NOTE — PROGRESS NOTES
Adult Annual Physical  Name: Ahsan Ba Jr.      : 1975      MRN: [id number]  Encounter Provider: Chasity Neves DO  Encounter Date: 3/5/2025   Encounter department: [place] [facility]    Assessment & Plan  Annual physical exam  Declines influenza and COVID-19 vaccines   Tetanus booster up-to-date  We will discuss Shingrix vaccination series at 51 years old  Declines preventative screenings for hepatitis C and HIV  No known familt hx of prostace cancer   Repeat colonoscopy in 5 years, due: 2029        Chronic fatigue    Orders:    CBC (Includes Diff/Plt) (Refl); Future    Iron Panel (Includes Ferritin, Iron Sat%, Iron, and TIBC); Future    TSH, 3rd generation with Free T4 reflex; Future  Will schedule sleep study  Leg cramp    Orders:    Iron Panel (Includes Ferritin, Iron Sat%, Iron, and TIBC); Future    Immunizations and preventive care screenings were discussed with patient today. Appropriate education was printed on patient's after visit summary.      Counseling:  Dental Health: discussed importance of regular tooth brushing, flossing, and dental visits.  Exercise: the importance of regular exercise/physical activity was discussed. Recommend exercise 3-5 times per week for at least 30 minutes.       Depression Screening and Follow-up Plan: Patient was screened for depression during today's encounter. They screened negative with a PHQ-9 score of 0.          History of Present Illness     Patient presents today with wife.  Wife mentions patient does have fatigue.  He also occasionally experiences cramping in his legs.      Adult Annual Physical:  Patient presents for annual physical.     Diet and Physical Activity:  - Diet/Nutrition:. will be starting Mediterranean diet  - Exercise: no formal exercise. plasn to start using treadmill    Depression Screening:    - PHQ-9 Score: 0    General Health:  - Sleep: sleeps well.  - Hearing: normal hearing bilateral ears.  - Vision: most  recent eye exam > 1 year ago. reading glasses  - Dental: regular dental visits.     Health:    - Urinary symptoms: none.     Review of Systems    As noted in HPI    Medical History Reviewed by provider this encounter:  Tobacco  Allergies  Meds  Problems  Med Hx  Surg Hx  Fam Hx     .  Past Medical History   Past Medical History:   Diagnosis Date    Allergic     Anxiety     Back pain     BMI 31.0-31.9,adult 04/25/2019    Class 1 obesity due to excess calories with serious comorbidity and body mass index (BMI) of 31.0 to 31.9 in adult 12/09/2021    GERD (gastroesophageal reflux disease)     Hypertension     Liver disease      Past Surgical History:   Procedure Laterality Date    APPENDECTOMY      CHOLECYSTECTOMY      HERNIA REPAIR      inguinal/umbilical - last assessed 6/29/16    WV EXC B9 LESION MRGN XCP SK TG T/A/L 1.1-2.0 CM Right 1/20/2016    Procedure: REVISION SCAR ABDOMINAL Exploration of painful scar on right upper abdomen;  Surgeon: Arturo Waters MD;  Location: [facility];  Service: General    [place] REPAIR FIRST ABDOMINAL WALL HERNIA N/A 1/20/2016    Procedure: REPAIR HERNIA VENTRAL (SMALL EPIGASTRIC HERNIA);  Surgeon: Arturo Waters MD;  Location: [facility];  Service: General     Family History   Problem Relation Age of Onset    Cancer Mother         lung    Hypertension Mother     Arthritis Mother     Hypertension Father     Hiatal hernia Father     Arthritis Father     Anxiety disorder Father     Atrial fibrillation Father     Cancer Maternal Uncle       reports that he has never smoked. He has never used smokeless tobacco. He reports that he does not drink alcohol and does not use drugs.  Current Outpatient Medications   Medication Instructions    albuterol (Proventil HFA) 90 mcg/act inhaler 2 puffs, Inhalation, Every 6 hours PRN    albuterol 2.5 mg, Nebulization, Every 6 hours PRN    ALPRAZolam (XANAX) 0.25 mg, Oral, Daily PRN    Azelastine HCl 137 MCG/SPRAY SOLN 1 spray, Each Nare, 2 times  daily, As directed    beclomethasone (Qvar RediHaler) 80 MCG/ACT inhaler 2 puffs, Inhalation, 2 times daily, Rinse mouth after use.    dicyclomine (BENTYL) 20 mg tablet take 1 tablet by mouth four times a day before meals and at bedtime    fexofenadine-pseudoephedrine (ALLEGRA-D)  MG per tablet 1 tablet, Daily    fluticasone (FLONASE) 50 mcg/act nasal spray 1 spray, Daily    hyoscyamine (ANASPAZ,LEVSIN) 0.125 mg, Oral, Every 4 hours PRN    losartan (COZAAR) 50 mg, Oral, Daily    MAGNESIUM GLUCONATE PO Oral, 100 mg daily     ondansetron (ZOFRAN-ODT) 4 mg, Oral, Every 6 hours PRN    pantoprazole (PROTONIX) 40 mg, Oral, Daily    promethazine-dextromethorphan (PHENERGAN-DM) 6.25-15 mg/5 mL oral syrup 5 mL, Oral, Daily at bedtime PRN    sertraline (ZOLOFT) 25 mg, Oral, Daily     Allergies   Allergen Reactions    Levaquin [Levofloxacin] GI Intolerance and Nausea Only    Rosuvastatin Myalgia      Current Outpatient Medications on File Prior to Visit   Medication Sig Dispense Refill    albuterol (2.5 mg/3 mL) 0.083 % nebulizer solution Take 3 mL (2.5 mg total) by nebulization every 6 (six) hours as needed for wheezing or shortness of breath 75 mL 1    albuterol (Proventil HFA) 90 mcg/act inhaler Inhale 2 puffs every 6 (six) hours as needed for wheezing 6.7 g 0    ALPRAZolam (XANAX) 0.25 mg tablet Take 1 tablet (0.25 mg total) by mouth daily as needed for anxiety 30 tablet 0    Azelastine HCl 137 MCG/SPRAY SOLN instill 1 spray into each nostril twice a day as directed 30 mL 1    beclomethasone (Qvar RediHaler) 80 MCG/ACT inhaler Inhale 2 puffs 2 (two) times a day Rinse mouth after use. 10.6 g 0    dicyclomine (BENTYL) 20 mg tablet take 1 tablet by mouth four times a day before meals and at bedtime 360 tablet 1    fexofenadine-pseudoephedrine (ALLEGRA-D)  MG per tablet Take 1 tablet by mouth daily      fluticasone (FLONASE) 50 mcg/act nasal spray 1 spray into each nostril daily      hyoscyamine (ANASPAZ,LEVSIN)  "0.125 MG tablet Take 1 tablet (0.125 mg total) by mouth every 4 (four) hours as needed for cramping 30 tablet 3    losartan (COZAAR) 50 mg tablet Take 1 tablet (50 mg total) by mouth daily 90 tablet 1    MAGNESIUM GLUCONATE PO Take by mouth 100 mg daily      ondansetron (ZOFRAN-ODT) 4 mg disintegrating tablet Take 1 tablet (4 mg total) by mouth every 6 (six) hours as needed for nausea or vomiting 10 tablet 0    pantoprazole (PROTONIX) 40 mg tablet Take 1 tablet (40 mg total) by mouth daily 90 tablet 1    promethazine-dextromethorphan (PHENERGAN-DM) 6.25-15 mg/5 mL oral syrup Take 5 mL by mouth daily at bedtime as needed for cough 118 mL 0    sertraline (ZOLOFT) 25 mg tablet take 1 tablet by mouth once daily 90 tablet 1     No current facility-administered medications on file prior to visit.      Social History     Tobacco Use    Smoking status: Never    Smokeless tobacco: Never   Vaping Use    Vaping status: Never Used   Substance and Sexual Activity    Alcohol use: No    Drug use: No    Sexual activity: Not on file       Objective   /86 (BP Location: Left arm, Patient Position: Sitting, Cuff Size: Large)   Pulse 77   Temp 97.7 °F (36.5 °C) (Temporal)   Resp 18   Ht 5' 10\" (1.778 m)   Wt 104 kg (230 lb 3.2 oz)   SpO2 97%   BMI 33.03 kg/m²     Physical Exam  Vitals reviewed.   Constitutional:       General: He is not in acute distress.     Appearance: Normal appearance.   HENT:      Head: Normocephalic and atraumatic.      Right Ear: External ear normal.      Left Ear: External ear normal.      Nose: Nose normal.      Mouth/Throat:      Mouth: Mucous membranes are moist.      Pharynx: Oropharynx is clear.   Eyes:      Extraocular Movements: Extraocular movements intact.      Conjunctiva/sclera: Conjunctivae normal.      Pupils: Pupils are equal, round, and reactive to light.   Cardiovascular:      Rate and Rhythm: Normal rate and regular rhythm.      Pulses: Normal pulses.      Heart sounds: Normal heart " sounds.   Pulmonary:      Effort: Pulmonary effort is normal. No respiratory distress.      Breath sounds: Normal breath sounds. No decreased breath sounds, wheezing or rales.   Abdominal:      General: Abdomen is flat. Bowel sounds are normal.      Palpations: Abdomen is soft.      Tenderness: There is no abdominal tenderness.   Musculoskeletal:      Cervical back: Neck supple.      Right lower leg: No edema.      Left lower leg: No edema.   Skin:     General: Skin is warm and dry.   Neurological:      Mental Status: He is alert and oriented to person, place, and time.   Psychiatric:         Mood and Affect: Mood normal.         Behavior: Behavior normal.         Thought Content: Thought content normal.         Judgment: Judgment normal.

## 2025-03-05 NOTE — ASSESSMENT & PLAN NOTE
BP Readings from Last 3 Encounters:   03/05/25 126/82   02/28/25 122/86   02/20/25 106/60   Continue lifestyle modification.   Medical therapy reviewed-continue losartan 50 mg daily.  Low-sodium diet and avoidance of alcohol..  Close blood pressure monitoring.

## 2025-03-05 NOTE — ASSESSMENT & PLAN NOTE
Recent episode of vasovagal syncope and orthostatic hypotension in the setting of systemic illness.  No recurrence.

## 2025-03-05 NOTE — ASSESSMENT & PLAN NOTE
He meets criteria for metabolic syndrome with the presence of low HDL, high triglycerides, BMI of 33, increased abdominal circumference and elevated hemoglobin A1c.  Prognostic implications reviewed.  Mediterranean style diet with low carbohydrate intake and regular exercise will be beneficial.  Coronary calcium score advised for further risk stratification.  If this is abnormal, he may benefit from additional medical therapy for his dyslipidemia and prediabetes.  He is scheduled to see Dr. Neves this afternoon for his regular physical.  Repeat labs will be planned after that visit.

## 2025-03-05 NOTE — LETTER
2025     DIAMOND Wilson[address]  [address]  [facility] [address]    Patient: Ahsan Ba Jr.   YOB: 1975   Date of Visit: 3/5/2025       Dear Ms. Daryl:    Thank you for referring Ahsan Ba to me for evaluation. Below are my notes for this consultation.    If you have questions, please do not hesitate to call me. I look forward to following your patient along with you.         Sincerely,        Jeffrey Florez MD        CC: DO Jeffrey Levine MD  3/5/2025 11:28 AM  Sign when Signing Visit  [facility] CARDIOLOGY [facility]  [address]  [address]  [facility] [address]  Phone#  [phone]  Fax#  [phone]  [facility] Cardiology Office Consultation             NAME: Ahsan Ba Jr.  AGE: 50 y.o. SEX: male   : 1975   MRN: [id number]    DATE: 3/5/2025  TIME: 11:26 AM    Cardiology Problem list:  Hypertension  Dyslipidemia  Obesity  Abnormal EKG: Sinus tachycardia, IRBBB      Assessment & Plan  Abnormal electrocardiogram (ECG) (EKG)  Recent EKG from the emergency room reviewed.  This revealed sinus tachycardia, right axis deviation with incomplete right bundle branch block.  Findings discussed with the patient.  Prognostic implications reviewed.  ASCVD risk reviewed.  Findings of dyslipidemia reviewed: HDL is low.  LDL is normal.  Triglycerides are elevated.  Liver function studies were normal.  Hemoglobin A1c is in the prediabetic range.  Further risk stratification can be considered with coronary artery calcium scoring and exercise treadmill stress testing.         Mixed hyperlipidemia  Lipids from 2024 personally reviewed.  HDL is low at 28.  Triglycerides elevated at 230.  LDL normal at 73.  Repeat fasting lipids advised.  Lifestyle and dietary modification recommended.  Consider coronary artery calcium scoring to assess need for lipid-lowering therapy.  LFTs are normal.         Essential hypertension  BP Readings from Last  3 Encounters:   02/28/25 122/86   02/20/25 106/60   02/17/25 (S) 120/82   Continue lifestyle modification.   Medical therapy reviewed.  Close blood pressure monitoring.           Right bundle branch block  EKG reviewed.  Exercise treadmill stress test and calcium score recommended.  Orders:  •  Ambulatory Referral to Cardiology           HPI:    Ahsan Ba Jr. is a 50 y.o.-year-old male who presents to the cardiology clinic for initial consultation.    He has been referred here for evaluation of an abnormal EKG noted during recent ER visit.  EKG reviewed personally, sinus tachycardia with incomplete right bundle branch block noted.  ER visits reviewed.  He presented to the ER after an episode of orthostatic syncope in the setting of influenza B infection.  He suddenly got up to go to the bathroom and had a syncopal event while in the bathroom.  He continues to report significant cough and there was concerns he may have reactive airway disease or bronchitis.  Tachycardia was felt to be related to dehydration.  Labs reviewed white count was 16,000.  CMP, lactate and troponin were normal.  COVID-negative and flu B positive.  CT head negative for bleed or infarct.  Chest x-ray showed possible left basilar pneumonia on 2/20/2025.  Both studies personally reviewed.  Recent follow-up with primary care on 2/28/2025.  Refractory cough.  No further syncope.  No effort angina.      Past history, family history, social history, current medications, vital signs, recent lab and imaging studies and  prior cardiology studies reviewed independently on this visit.    Allergies   Allergen Reactions   • Levaquin [Levofloxacin] GI Intolerance and Nausea Only   • Rosuvastatin Myalgia       Current Outpatient Medications:   •  albuterol (2.5 mg/3 mL) 0.083 % nebulizer solution, Take 3 mL (2.5 mg total) by nebulization every 6 (six) hours as needed for wheezing or shortness of breath, Disp: 75 mL, Rfl: 1  •  albuterol (Proventil HFA)  90 mcg/act inhaler, Inhale 2 puffs every 6 (six) hours as needed for wheezing, Disp: 6.7 g, Rfl: 0  •  ALPRAZolam (XANAX) 0.25 mg tablet, Take 1 tablet (0.25 mg total) by mouth daily as needed for anxiety, Disp: 30 tablet, Rfl: 0  •  Azelastine HCl 137 MCG/SPRAY SOLN, instill 1 spray into each nostril twice a day as directed, Disp: 30 mL, Rfl: 1  •  beclomethasone (Qvar RediHaler) 80 MCG/ACT inhaler, Inhale 2 puffs 2 (two) times a day Rinse mouth after use., Disp: 10.6 g, Rfl: 0  •  dicyclomine (BENTYL) 20 mg tablet, take 1 tablet by mouth four times a day before meals and at bedtime, Disp: 360 tablet, Rfl: 1  •  fexofenadine-pseudoephedrine (ALLEGRA-D)  MG per tablet, Take 1 tablet by mouth daily, Disp: , Rfl:   •  fluticasone (FLONASE) 50 mcg/act nasal spray, 1 spray into each nostril daily, Disp: , Rfl:   •  hyoscyamine (ANASPAZ,LEVSIN) 0.125 MG tablet, Take 1 tablet (0.125 mg total) by mouth every 4 (four) hours as needed for cramping, Disp: 30 tablet, Rfl: 3  •  losartan (COZAAR) 50 mg tablet, Take 1 tablet (50 mg total) by mouth daily, Disp: 90 tablet, Rfl: 1  •  ondansetron (ZOFRAN-ODT) 4 mg disintegrating tablet, Take 1 tablet (4 mg total) by mouth every 6 (six) hours as needed for nausea or vomiting, Disp: 10 tablet, Rfl: 0  •  pantoprazole (PROTONIX) 40 mg tablet, Take 1 tablet (40 mg total) by mouth daily, Disp: 90 tablet, Rfl: 1  •  promethazine-dextromethorphan (PHENERGAN-DM) 6.25-15 mg/5 mL oral syrup, Take 5 mL by mouth daily at bedtime as needed for cough, Disp: 118 mL, Rfl: 0  •  sertraline (ZOLOFT) 25 mg tablet, take 1 tablet by mouth once daily, Disp: 90 tablet, Rfl: 1    Past Medical History:   Diagnosis Date   • Allergic    • Anxiety    • Back pain    • BMI 31.0-31.9,adult 04/25/2019   • Class 1 obesity due to excess calories with serious comorbidity and body mass index (BMI) of 31.0 to 31.9 in adult 12/09/2021   • GERD (gastroesophageal reflux disease)    • Hypertension    • Liver disease       Past Surgical History:   Procedure Laterality Date   • APPENDECTOMY     • CHOLECYSTECTOMY     • HERNIA REPAIR      inguinal/umbilical - last assessed 6/29/16   • AK EXC B9 LESION MRGN XCP SK TG T/A/L 1.1-2.0 CM Right 1/20/2016    Procedure: REVISION SCAR ABDOMINAL Exploration of painful scar on right upper abdomen;  Surgeon: Arturo Waters MD;  Location: [facility];  Service: General   • AK REPAIR FIRST ABDOMINAL WALL HERNIA N/A 1/20/2016    Procedure: REPAIR HERNIA VENTRAL (SMALL EPIGASTRIC HERNIA);  Surgeon: Arturo Waters MD;  Location: [facility];  Service: General     Family History   Problem Relation Age of Onset   • Cancer Mother         lung   • Hypertension Mother    • Arthritis Mother    • Hypertension Father    • Hiatal hernia Father    • Arthritis Father    • Anxiety disorder Father    • Cancer Maternal Uncle      Social History   reports that he has never smoked. He has never used smokeless tobacco. He reports that he does not drink alcohol and does not use drugs.     Review of Systems    Objective:     There were no vitals filed for this visit.  Physical Exam    Pertinent Laboratory/Diagnostic Studies:    Laboratory studies reviewed personally by Jeffrey Florez MD    BMP:   Lab Results   Component Value Date    SODIUM 136 02/20/2025    K 4.2 02/20/2025     02/20/2025    CO2 28 02/20/2025    BUN 13 02/20/2025    CREATININE 1.25 02/20/2025    GLUC 114 02/20/2025    CALCIUM 8.9 02/20/2025     CBC:  Lab Results   Component Value Date    WBC 16.00 (H) 02/20/2025    HGB 15.0 02/20/2025    HCT 46.5 02/20/2025    MCV 82 02/20/2025     02/20/2025     Lipid Profile:   Lab Results   Component Value Date    HDL 28 (L) 01/29/2024     Lab Results   Component Value Date    LDLCALC 73 01/29/2024     Lab Results   Component Value Date    TRIG 230 (H) 01/29/2024      Other labs:  Lab Results   Component Value Date    KEC3QYDTGVOV 1.911 07/14/2023     Lab Results   Component Value Date    ALT 31  "02/20/2025    AST 21 02/20/2025     Lab Results   Component Value Date    HGBA1C 5.8 (H) 01/29/2024       Imaging Studies:     Pertinent cardiac studies and imaging studies were personally reviewed on this visit and results summarized.    I have spent a total time of 55  minutes in caring for this patient on the day of the visit/encounter including reviewing and entering of medical history, physical examination, diagnostic results, instructions for management, patient education, risk factor reduction, documenting in the medical record, sending communications to other providers, reviewing/ordering tests, medicine, procedures etc.    Portions of the record may have been created with voice recognition software.  Occasional wrong word or \"sound alike\" substitutions may have occurred due to the inherent limitations of voice recognition software.  Read the chart carefully and recognize, using context, where substitutions have occurred. Please reach out to me directly for any clarifications.  "

## 2025-03-06 ENCOUNTER — RESULTS FOLLOW-UP (OUTPATIENT)
Dept: FAMILY MEDICINE CLINIC | Facility: MEDICAL CENTER | Age: 50
End: 2025-03-06

## 2025-03-06 DIAGNOSIS — D75.839 THROMBOCYTOSIS: ICD-10-CM

## 2025-03-06 DIAGNOSIS — D72.829 LEUKOCYTOSIS, UNSPECIFIED TYPE: Primary | ICD-10-CM

## 2025-03-13 ENCOUNTER — APPOINTMENT (OUTPATIENT)
Dept: LAB | Facility: MEDICAL CENTER | Age: 50
End: 2025-03-13
Payer: COMMERCIAL

## 2025-03-13 DIAGNOSIS — D75.839 THROMBOCYTOSIS: ICD-10-CM

## 2025-03-13 DIAGNOSIS — D72.829 LEUKOCYTOSIS, UNSPECIFIED TYPE: ICD-10-CM

## 2025-03-13 LAB
BASOPHILS # BLD AUTO: 0.11 THOUSANDS/ÂΜL (ref 0–0.1)
BASOPHILS NFR BLD AUTO: 1 % (ref 0–1)
EOSINOPHIL # BLD AUTO: 0.33 THOUSAND/ÂΜL (ref 0–0.61)
EOSINOPHIL NFR BLD AUTO: 4 % (ref 0–6)
ERYTHROCYTE [DISTWIDTH] IN BLOOD BY AUTOMATED COUNT: 13.7 % (ref 11.6–15.1)
HCT VFR BLD AUTO: 47 % (ref 36.5–49.3)
HGB BLD-MCNC: 14.7 G/DL (ref 12–17)
IMM GRANULOCYTES # BLD AUTO: 0.03 THOUSAND/UL (ref 0–0.2)
IMM GRANULOCYTES NFR BLD AUTO: 0 % (ref 0–2)
LYMPHOCYTES # BLD AUTO: 3.7 THOUSANDS/ÂΜL (ref 0.6–4.47)
LYMPHOCYTES NFR BLD AUTO: 40 % (ref 14–44)
MCH RBC QN AUTO: 26.7 PG (ref 26.8–34.3)
MCHC RBC AUTO-ENTMCNC: 31.3 G/DL (ref 31.4–37.4)
MCV RBC AUTO: 86 FL (ref 82–98)
MONOCYTES # BLD AUTO: 0.78 THOUSAND/ÂΜL (ref 0.17–1.22)
MONOCYTES NFR BLD AUTO: 8 % (ref 4–12)
NEUTROPHILS # BLD AUTO: 4.3 THOUSANDS/ÂΜL (ref 1.85–7.62)
NEUTS SEG NFR BLD AUTO: 47 % (ref 43–75)
NRBC BLD AUTO-RTO: 0 /100 WBCS
PLATELET # BLD AUTO: 430 THOUSANDS/UL (ref 149–390)
PMV BLD AUTO: 10.9 FL (ref 8.9–12.7)
RBC # BLD AUTO: 5.5 MILLION/UL (ref 3.88–5.62)
WBC # BLD AUTO: 9.25 THOUSAND/UL (ref 4.31–10.16)

## 2025-03-13 PROCEDURE — 85025 COMPLETE CBC W/AUTO DIFF WBC: CPT

## 2025-03-13 PROCEDURE — 36415 COLL VENOUS BLD VENIPUNCTURE: CPT

## 2025-03-14 ENCOUNTER — RESULTS FOLLOW-UP (OUTPATIENT)
Dept: FAMILY MEDICINE CLINIC | Facility: MEDICAL CENTER | Age: 50
End: 2025-03-14

## 2025-04-08 ENCOUNTER — HOSPITAL ENCOUNTER (OUTPATIENT)
Dept: NON INVASIVE DIAGNOSTICS | Facility: CLINIC | Age: 50
Discharge: HOME | End: 2025-04-08

## 2025-04-22 DIAGNOSIS — R09.81 SINUS CONGESTION: ICD-10-CM

## 2025-04-22 RX ORDER — AZELASTINE HYDROCHLORIDE 137 UG/1
1 SPRAY, METERED NASAL 2 TIMES DAILY
Qty: 30 ML | Refills: 1 | Status: SHIPPED | OUTPATIENT
Start: 2025-04-22

## 2025-04-29 DIAGNOSIS — I10 ESSENTIAL HYPERTENSION: ICD-10-CM

## 2025-04-29 RX ORDER — LOSARTAN POTASSIUM 50 MG/1
50 TABLET ORAL DAILY
Qty: 90 TABLET | Refills: 1 | Status: SHIPPED | OUTPATIENT
Start: 2025-04-29

## 2025-05-01 DIAGNOSIS — I10 ESSENTIAL HYPERTENSION: ICD-10-CM

## 2025-05-01 DIAGNOSIS — K21.9 GASTROESOPHAGEAL REFLUX DISEASE: ICD-10-CM

## 2025-05-01 RX ORDER — PANTOPRAZOLE SODIUM 40 MG/1
40 TABLET, DELAYED RELEASE ORAL DAILY
Qty: 90 TABLET | Refills: 1 | Status: SHIPPED | OUTPATIENT
Start: 2025-05-01

## 2025-05-01 RX ORDER — LOSARTAN POTASSIUM 50 MG/1
50 TABLET ORAL DAILY
Qty: 90 TABLET | Refills: 0 | OUTPATIENT
Start: 2025-05-01

## 2025-05-13 DIAGNOSIS — F32.1 CURRENT MODERATE EPISODE OF MAJOR DEPRESSIVE DISORDER WITHOUT PRIOR EPISODE (HCC): ICD-10-CM

## 2025-05-13 DIAGNOSIS — F41.9 ANXIETY: ICD-10-CM

## 2025-05-13 RX ORDER — SERTRALINE HYDROCHLORIDE 25 MG/1
25 TABLET, FILM COATED ORAL DAILY
Qty: 90 TABLET | Refills: 1 | Status: SHIPPED | OUTPATIENT
Start: 2025-05-13

## 2025-05-15 ENCOUNTER — TELEPHONE (OUTPATIENT)
Age: 50
End: 2025-05-15

## 2025-05-15 DIAGNOSIS — E66.9 OBESITY (BMI 30-39.9): Primary | ICD-10-CM

## 2025-05-15 NOTE — TELEPHONE ENCOUNTER
S/w pts wife Katelynn.  Pt's wife is seeing endo and wt management, and started on Zepbound.  Pt is seeing his wife's results and wondering if maybe he should go that route also.  Wife wondering is that something you could prescribe, or wondering if you are willing to give him a referral to weight management?

## 2025-05-15 NOTE — TELEPHONE ENCOUNTER
S/w wife, she is aware that order placed, and she already had the phone number for the [place] location.

## 2025-05-15 NOTE — TELEPHONE ENCOUNTER
Pt's wife called in to set up an appt with Dr. Neves to weight management and anxiety. There was nothing available in the near future. Please reach out this pt's wife.

## 2025-05-16 ENCOUNTER — NURSE TRIAGE (OUTPATIENT)
Age: 50
End: 2025-05-16

## 2025-05-16 ENCOUNTER — HOSPITAL ENCOUNTER (EMERGENCY)
Facility: HOSPITAL | Age: 50
Discharge: HOME | End: 2025-05-16
Attending: EMERGENCY MEDICINE
Payer: COMMERCIAL

## 2025-05-16 VITALS
SYSTOLIC BLOOD PRESSURE: 141 MMHG | RESPIRATION RATE: 18 BRPM | OXYGEN SATURATION: 98 % | DIASTOLIC BLOOD PRESSURE: 86 MMHG | TEMPERATURE: 98.1 F | BODY MASS INDEX: 32.36 KG/M2 | HEART RATE: 80 BPM | WEIGHT: 225.53 LBS

## 2025-05-16 DIAGNOSIS — S39.012A STRAIN OF LUMBAR REGION, INITIAL ENCOUNTER: Primary | ICD-10-CM

## 2025-05-16 PROCEDURE — 99285 EMERGENCY DEPT VISIT HI MDM: CPT | Performed by: EMERGENCY MEDICINE

## 2025-05-16 PROCEDURE — 99282 EMERGENCY DEPT VISIT SF MDM: CPT

## 2025-05-16 PROCEDURE — 96372 THER/PROPH/DIAG INJ SC/IM: CPT

## 2025-05-16 RX ORDER — DIAZEPAM 5 MG/1
5 TABLET ORAL EVERY 6 HOURS PRN
Qty: 20 TABLET | Refills: 0 | Status: SHIPPED | OUTPATIENT
Start: 2025-05-16

## 2025-05-16 RX ORDER — MORPHINE SULFATE 4 MG/ML
4 INJECTION, SOLUTION INTRAMUSCULAR; INTRAVENOUS ONCE
Status: COMPLETED | OUTPATIENT
Start: 2025-05-16 | End: 2025-05-16

## 2025-05-16 RX ORDER — DIAZEPAM 10 MG/2ML
5 INJECTION, SOLUTION INTRAMUSCULAR; INTRAVENOUS ONCE
Status: COMPLETED | OUTPATIENT
Start: 2025-05-16 | End: 2025-05-16

## 2025-05-16 RX ORDER — KETOROLAC TROMETHAMINE 30 MG/ML
60 INJECTION, SOLUTION INTRAMUSCULAR; INTRAVENOUS ONCE
Status: COMPLETED | OUTPATIENT
Start: 2025-05-16 | End: 2025-05-16

## 2025-05-16 RX ORDER — IBUPROFEN 800 MG/1
800 TABLET, FILM COATED ORAL EVERY 6 HOURS PRN
Qty: 30 TABLET | Refills: 0 | Status: SHIPPED | OUTPATIENT
Start: 2025-05-16

## 2025-05-16 RX ORDER — PREDNISONE 20 MG/1
60 TABLET ORAL DAILY
Qty: 15 TABLET | Refills: 0 | Status: SHIPPED | OUTPATIENT
Start: 2025-05-16 | End: 2025-05-21

## 2025-05-16 RX ADMIN — KETOROLAC TROMETHAMINE 60 MG: 30 INJECTION, SOLUTION INTRAMUSCULAR at 17:50

## 2025-05-16 RX ADMIN — MORPHINE SULFATE 4 MG: 4 INJECTION INTRAVENOUS at 17:48

## 2025-05-16 RX ADMIN — DIAZEPAM 5 MG: 5 INJECTION, SOLUTION INTRAMUSCULAR; INTRAVENOUS at 17:46

## 2025-05-16 NOTE — ED PROVIDER NOTES
"  ED Disposition       None          Assessment & Plan       Medical Decision Making  No signs or symptoms of cord compression or cauda equina syndrome.  No \"red flag\" signs or symptoms.  Patient is under 55 and there is no significant trauma.  Imaging is not indicated.  Nothing to suggest an intra-abdominal cause of back pain such as AAA, obstructive uropathy, acute cholecystitis, pancreatitis, or other intra-abdominal causes of back pain.  Clearly musculoskeletal.  Appropriate for discharge and outpatient management.    Amount and/or Complexity of Data Reviewed  Independent Historian: spouse    Risk  Prescription drug management.  Decision regarding hospitalization.             Medications   diazepam (VALIUM) injection 5 mg (has no administration in time range)   ketorolac (TORADOL) injection 60 mg (has no administration in time range)   morphine injection 4 mg (has no administration in time range)       ED Risk Strat Scores                    No data recorded        SBIRT 20yo+      Flowsheet Row Most Recent Value   Initial Alcohol Screen: US AUDIT-C     1. How often do you have a drink containing alcohol? 0 Filed at: 05/16/2025 7017   2. How many drinks containing alcohol do you have on a typical day you are drinking?  0 Filed at: 05/16/2025 3513   3a. Male UNDER 65: How often do you have five or more drinks on one occasion? 0 Filed at: 05/16/2025 1543   3b. FEMALE Any Age, or MALE 65+: How often do you have 4 or more drinks on one occassion? 0 Filed at: 05/16/2025 1543   Audit-C Score 0 Filed at: 05/16/2025 1543   JUNIOR: How many times in the past year have you...    Used an illegal drug or used a prescription medication for non-medical reasons? Never Filed at: 05/16/2025 3413                            History of Present Illness       Chief Complaint   Patient presents with    Back Pain     Pt reports lower back pain after yard work. Pain mainly in R side. Denies numbness or tingling. Denies loss of bowel or " bladder       Past Medical History:   Diagnosis Date    Allergic     Anxiety     Back pain     BMI 31.0-31.9,adult 04/25/2019    Class 1 obesity due to excess calories with serious comorbidity and body mass index (BMI) of 31.0 to 31.9 in adult 12/09/2021    GERD (gastroesophageal reflux disease)     Hypertension     Liver disease       Past Surgical History:   Procedure Laterality Date    APPENDECTOMY      CHOLECYSTECTOMY      HERNIA REPAIR      inguinal/umbilical - last assessed 6/29/16    RI EXC B9 LESION MRGN XCP SK TG T/A/L 1.1-2.0 CM Right 1/20/2016    Procedure: REVISION SCAR ABDOMINAL Exploration of painful scar on right upper abdomen;  Surgeon: Arturo Waters MD;  Location: [facility];  Service: General    RI REPAIR FIRST ABDOMINAL WALL HERNIA N/A 1/20/2016    Procedure: REPAIR HERNIA VENTRAL (SMALL EPIGASTRIC HERNIA);  Surgeon: Arturo Waters MD;  Location: [facility];  Service: General      Family History   Problem Relation Age of Onset    Cancer Mother         lung    Hypertension Mother     Arthritis Mother     Hypertension Father     Hiatal hernia Father     Arthritis Father     Anxiety disorder Father     Atrial fibrillation Father     Cancer Maternal Uncle       Social History[1]   E-Cigarette/Vaping    E-Cigarette Use Never User       E-Cigarette/Vaping Substances    Nicotine No     THC No     CBD No     Flavoring No     Other No     Unknown No       I have reviewed and agree with the history as documented.     Patient is a 50-year-old male.  He has had some issues with low back pain.  He is followed by chiropractor.  Patient reports that he was doing a lot of yard work and subsequently developed lower back pain with muscle spasms.  It hurts to move.  No motor or sensory complaints in the lower extremities.  No incontinence.  No history of cancer or IV drug use.  No abdominal pain.  No fever or chills.  No dysuria, hematuria, or frequency.        Review of Systems   Constitutional:  Negative for  chills and fever.   HENT:  Negative for rhinorrhea and sore throat.    Eyes:  Negative for pain, redness and visual disturbance.   Respiratory:  Negative for cough and shortness of breath.    Cardiovascular:  Negative for chest pain and leg swelling.   Gastrointestinal:  Negative for abdominal pain, diarrhea and vomiting.   Endocrine: Negative for polydipsia and polyuria.   Genitourinary:  Negative for dysuria, frequency and hematuria.   Musculoskeletal:  Positive for back pain. Negative for neck pain.   Skin:  Negative for rash and wound.   Allergic/Immunologic: Negative for immunocompromised state.   Neurological:  Negative for weakness, numbness and headaches.   Psychiatric/Behavioral:  Negative for hallucinations and suicidal ideas.    All other systems reviewed and are negative.          Objective       ED Triage Vitals [05/16/25 1541]   Temperature Pulse Blood Pressure Respirations SpO2 Patient Position - Orthostatic VS   (!) 97 °F (36.1 °C) 78 141/86 16 98 % Sitting      Temp Source Heart Rate Source BP Location FiO2 (%) Pain Score    Temporal Monitor Left arm -- --      Vitals      Date and Time Temp Pulse SpO2 Resp BP Pain Score FACES Pain Rating User   05/16/25 1717 98.1 °F (36.7 °C) 80 98 % 18 141/86 -- -- MB   05/16/25 1541 97 °F (36.1 °C) 78 98 % 16 141/86 -- -- NO            Physical Exam  Vitals reviewed.   Constitutional:       General: He is not in acute distress.     Appearance: He is not toxic-appearing.   HENT:      Head: Normocephalic and atraumatic.      Nose: Nose normal.      Mouth/Throat:      Mouth: Mucous membranes are moist.     Eyes:      General:         Right eye: No discharge.         Left eye: No discharge.      Conjunctiva/sclera: Conjunctivae normal.       Cardiovascular:      Rate and Rhythm: Normal rate and regular rhythm.      Pulses: Normal pulses.      Heart sounds: Normal heart sounds. No murmur heard.     No friction rub. No gallop.   Pulmonary:      Effort: Pulmonary effort  is normal. No respiratory distress.      Breath sounds: Normal breath sounds. No stridor. No wheezing, rhonchi or rales.   Abdominal:      General: Bowel sounds are normal. There is no distension.      Palpations: Abdomen is soft.      Tenderness: There is no abdominal tenderness. There is no right CVA tenderness, left CVA tenderness, guarding or rebound.      Comments: No pulsatile mass.     Musculoskeletal:         General: Tenderness present. No swelling, deformity or signs of injury.      Cervical back: Normal range of motion and neck supple. No rigidity.      Right lower leg: No edema.      Left lower leg: No edema.      Comments: No calf pain or unilateral leg swelling.  There is lumbar paraspinous muscle tenderness bilaterally.  There is pain with range of motion of the lower back.     Skin:     General: Skin is warm and dry.      Coloration: Skin is not jaundiced or pale.      Findings: No bruising, erythema or rash.     Neurological:      General: No focal deficit present.      Mental Status: He is alert and oriented to person, place, and time.      Cranial Nerves: No facial asymmetry.      Sensory: No sensory deficit.      Motor: Motor function is intact.      Comments: No saddle anesthesia.   Psychiatric:         Mood and Affect: Mood normal.         Behavior: Behavior normal.         Results Reviewed       None            No orders to display       Procedures    ED Medication and Procedure Management   Prior to Admission Medications   Prescriptions Last Dose Informant Patient Reported? Taking?   ALPRAZolam (XANAX) 0.25 mg tablet  Self No No   Sig: Take 1 tablet (0.25 mg total) by mouth daily as needed for anxiety   Azelastine HCl 137 MCG/SPRAY SOLN   No No   Sig: instill 1 spray into each nostril twice a day as directed   MAGNESIUM GLUCONATE PO   Yes No   Sig: Take by mouth 100 mg daily   albuterol (2.5 mg/3 mL) 0.083 % nebulizer solution  Self No No   Sig: Take 3 mL (2.5 mg total) by nebulization every  6 (six) hours as needed for wheezing or shortness of breath   albuterol (Proventil HFA) 90 mcg/act inhaler  Self No No   Sig: Inhale 2 puffs every 6 (six) hours as needed for wheezing   beclomethasone (Qvar RediHaler) 80 MCG/ACT inhaler  Self No No   Sig: Inhale 2 puffs 2 (two) times a day Rinse mouth after use.   dicyclomine (BENTYL) 20 mg tablet  Self No No   Sig: take 1 tablet by mouth four times a day before meals and at bedtime   fexofenadine-pseudoephedrine (ALLEGRA-D)  MG per tablet  Self Yes No   Sig: Take 1 tablet by mouth daily   fluticasone (FLONASE) 50 mcg/act nasal spray  Self Yes No   Si spray into each nostril daily   hyoscyamine (ANASPAZ,LEVSIN) 0.125 MG tablet  Self No No   Sig: Take 1 tablet (0.125 mg total) by mouth every 4 (four) hours as needed for cramping   losartan (COZAAR) 50 mg tablet   No No   Sig: take 1 tablet by mouth once daily   ondansetron (ZOFRAN-ODT) 4 mg disintegrating tablet  Self No No   Sig: Take 1 tablet (4 mg total) by mouth every 6 (six) hours as needed for nausea or vomiting   pantoprazole (PROTONIX) 40 mg tablet   No No   Sig: Take 1 tablet (40 mg total) by mouth daily   promethazine-dextromethorphan (PHENERGAN-DM) 6.25-15 mg/5 mL oral syrup  Self No No   Sig: Take 5 mL by mouth daily at bedtime as needed for cough   sertraline (ZOLOFT) 25 mg tablet   No No   Sig: take 1 tablet by mouth once daily      Facility-Administered Medications: None     Patient's Medications   Discharge Prescriptions    No medications on file     No discharge procedures on file.  ED SEPSIS DOCUMENTATION              [1]   Social History  Tobacco Use    Smoking status: Never    Smokeless tobacco: Never   Vaping Use    Vaping status: Never Used   Substance Use Topics    Alcohol use: No    Drug use: No        Duane Hickey MD  [phone]

## 2025-05-16 NOTE — TELEPHONE ENCOUNTER
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 States that patient is having a hard time getting around and is in a lot of pain. No numbness, tingling, bowel/bladder issues. This has happened in the past and prednisone/muscle relaxer has helped. Advil is not helping. She is asking if it can be called in as there are no available appts. Please advise.     OTHER: NA    DISPOSITION: See Today in Office    Reason for Disposition   SEVERE back pain (e.g., excruciating, unable to do any normal activities) and not improved after pain medicine and CARE ADVICE    Answer Assessment - Initial Assessment Questions  1. ONSET: \"When did the pain begin?\" (e.g., minutes, hours, days)      Yesterday  2. LOCATION: \"Where does it hurt?\" (upper, mid or lower back)      Lower back  3. SEVERITY: \"How bad is the pain?\"  (e.g., Scale 1-10; mild, moderate, or severe)      Severe  4. PATTERN: \"Is the pain constant?\" (e.g., yes, no; constant, intermittent)       Constant  5. RADIATION: \"Does the pain shoot into your legs or somewhere else?\"      No  6. CAUSE:  \"What do you think is causing the back pain?\"       Manual labor  7. BACK OVERUSE:  \"Any recent lifting of heavy objects, strenuous work or exercise?\"      Yes  8. MEDICINES: \"What have you taken so far for the pain?\" (e.g., nothing, acetaminophen, NSAIDS)      Advil  9. NEUROLOGIC SYMPTOMS: \"Do you have any weakness, numbness, or problems with bowel/bladder control?\"      No  10. OTHER SYMPTOMS: \"Do you have any other symptoms?\" (e.g., fever, abdomen pain, burning with urination, blood in urine)        No  11. PREGNANCY: \"Is there any chance you are pregnant?\" \"When was your last menstrual period?\"        NA    Protocols used: Back Pain-Adult-[place]    "

## 2025-05-16 NOTE — TELEPHONE ENCOUNTER
Regarding: back pain  ----- Message from Laura BEAR sent at 5/16/2025 12:52 PM EDT -----  Pts wife called to say the patient pulled something in his back again yesterday and can barely move. She wanted to make an appt today but there are non available and she honesty does not know if she can get him in the car anyway.

## 2025-05-16 NOTE — TELEPHONE ENCOUNTER
Patient wife is calling back to see if medication could be called in.  I did state the doctor recommended an appointment.  Spoke with office and no apts are available today.  Patient's wife stated he is in pain and can't walk so she will be taking him to Emergency Room.

## 2025-05-19 ENCOUNTER — TELEPHONE (OUTPATIENT)
Dept: FAMILY MEDICINE CLINIC | Facility: MEDICAL CENTER | Age: 50
End: 2025-05-19

## 2025-05-19 ENCOUNTER — NURSE TRIAGE (OUTPATIENT)
Dept: PHYSICAL THERAPY | Facility: OTHER | Age: 50
End: 2025-05-19

## 2025-05-19 DIAGNOSIS — M54.50 ACUTE BILATERAL LOW BACK PAIN WITHOUT SCIATICA: Primary | ICD-10-CM

## 2025-05-19 NOTE — TELEPHONE ENCOUNTER
Pt went to the ER for back pain and was dc'd on prednisone 60mg daily for 5 days.  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  Please advise.

## 2025-05-19 NOTE — TELEPHONE ENCOUNTER
Additional Information   Negative: Is this related to a work injury?   Negative: Is this related to an MVA?   Negative: Are you currently recieving homecare services?   Negative: Has the patient had unexplained weight loss?   Negative: Does the patient have a fever?   Negative: Is the patient experiencing blood in sputum?   Affirmative: Is this a chronic condition?   Negative: Is the patient experiencing urine retention?   Negative: Is the patient experiencing acute drop foot or paralysis?   Negative: Has the patient experienced major trauma? (fall from height, high speed collision, direct blow to spine) and is also experiencing nausea, light-headedness, or loss of consciousness?    Background - Initial Assessment  Clinical complaint: bilateral low back pain R>L. Non radiating with no numbness or tingling. States this pain started a week ago after lawn work. States history of back pain and has seen a chiropractor in the past.  Date of onset: 1 week  Frequency of pain: constant  Quality of pain: aching, sharp, and stabbing    Protocols used: [facility] Protocol    [facility] was reviewed in detail and what we can provide for their back pain.  Patient is agreeable to being triaged and would like to proceed with Physical Therapy.    Referral was placed for Physical Therapy at the [place] site. Patients information was sent to the  to make evaluation appointment. Patient made aware that the PT office  will be calling to schedule the appointment.  Patient was provided with the phone number to the PT office.    No further questions and/or concerns were voiced by the patient at this time. Patient states understanding of the referral that was placed.    Referral Closed.

## 2025-05-22 ENCOUNTER — APPOINTMENT (OUTPATIENT)
Dept: RADIOLOGY | Facility: MEDICAL CENTER | Age: 50
End: 2025-05-22
Payer: COMMERCIAL

## 2025-05-22 ENCOUNTER — OFFICE VISIT (OUTPATIENT)
Dept: FAMILY MEDICINE CLINIC | Facility: MEDICAL CENTER | Age: 50
End: 2025-05-22
Payer: COMMERCIAL

## 2025-05-22 VITALS
BODY MASS INDEX: 33.16 KG/M2 | HEIGHT: 70 IN | HEART RATE: 67 BPM | SYSTOLIC BLOOD PRESSURE: 128 MMHG | OXYGEN SATURATION: 97 % | WEIGHT: 231.6 LBS | DIASTOLIC BLOOD PRESSURE: 90 MMHG | TEMPERATURE: 97.5 F | RESPIRATION RATE: 18 BRPM

## 2025-05-22 DIAGNOSIS — M54.50 ACUTE BILATERAL LOW BACK PAIN WITHOUT SCIATICA: Primary | ICD-10-CM

## 2025-05-22 DIAGNOSIS — M54.50 ACUTE BILATERAL LOW BACK PAIN WITHOUT SCIATICA: ICD-10-CM

## 2025-05-22 PROCEDURE — 99213 OFFICE O/P EST LOW 20 MIN: CPT | Performed by: INTERNAL MEDICINE

## 2025-05-22 PROCEDURE — 72110 X-RAY EXAM L-2 SPINE 4/>VWS: CPT

## 2025-05-22 RX ORDER — PREDNISONE 10 MG/1
TABLET ORAL
Qty: 30 TABLET | Refills: 0 | Status: SHIPPED | OUTPATIENT
Start: 2025-05-22

## 2025-05-22 RX ORDER — CYCLOBENZAPRINE HCL 10 MG
10 TABLET ORAL 3 TIMES DAILY PRN
Qty: 30 TABLET | Refills: 2 | Status: SHIPPED | OUTPATIENT
Start: 2025-05-22

## 2025-05-22 NOTE — PROGRESS NOTES
Name: Ahsan Ba Jr.      : 1975      MRN: [id number]  Encounter Provider: Nadege Shah MD  Encounter Date: 2025   Encounter department: [facility] [place]  :  Assessment & Plan  Acute bilateral low back pain without sciatica    Orders:    XR spine lumbar minimum 4 views non injury; Future    cyclobenzaprine (FLEXERIL) 10 mg tablet; Take 1 tablet (10 mg total) by mouth 3 (three) times a day as needed for muscle spasms    predniSONE 10 mg tablet; Take 40 mg daily for 3 days, 30 mg daily for 3 days, 20 mg daily for 3 days, 10 mg daily for 3 days    Will get back to him with the results of the x-ray  He was advised to take Tylenol/ibuprofen along with the muscle relaxant.  If it does not get better with the above medications, he will take the prednisone taper.  He was also told to use heat and do back exercises.  Back pain       History of Present Illness   Back Pain  This is a new problem. The current episode started in the past 7 days. The problem occurs constantly. The problem is unchanged. The pain is present in the lumbar spine. The pain does not radiate. The pain is at a severity of 5/10. The pain is moderate. The symptoms are aggravated by bending, twisting and standing. Pertinent negatives include no abdominal pain, chest pain, dysuria, fever, headaches, numbness or weakness. He has tried NSAIDs and heat for the symptoms. The treatment provided mild relief.     Review of Systems   Constitutional:  Negative for chills, diaphoresis, fatigue and fever.   HENT:  Negative for congestion, ear discharge, ear pain, hearing loss, postnasal drip, rhinorrhea, sinus pressure, sinus pain, sneezing, sore throat and voice change.    Eyes:  Negative for pain, discharge, redness and visual disturbance.   Respiratory:  Negative for cough, chest tightness, shortness of breath and wheezing.    Cardiovascular:  Negative for chest pain, palpitations and leg swelling.   Gastrointestinal:   "Negative for abdominal distention, abdominal pain, blood in stool, constipation, diarrhea, nausea and vomiting.   Endocrine: Negative for cold intolerance, heat intolerance, polydipsia, polyphagia and polyuria.   Genitourinary:  Negative for dysuria, flank pain, frequency, hematuria and urgency.   Musculoskeletal:  Positive for back pain. Negative for arthralgias, gait problem, joint swelling, myalgias, neck pain and neck stiffness.   Skin:  Negative for rash.   Neurological:  Negative for dizziness, tremors, syncope, facial asymmetry, speech difficulty, weakness, light-headedness, numbness and headaches.   Hematological:  Does not bruise/bleed easily.   Psychiatric/Behavioral:  Negative for behavioral problems, confusion and sleep disturbance. The patient is not nervous/anxious.        Objective   BP (S) 128/90 (BP Location: Left arm, Patient Position: Sitting, Cuff Size: Large)   Pulse 67   Temp 97.5 °F (36.4 °C) (Temporal)   Resp 18   Ht 5' 10\" (1.778 m)   Wt 105 kg (231 lb 9.6 oz)   SpO2 97%   BMI 33.23 kg/m²      Physical Exam  Constitutional:       General: He is not in acute distress.     Appearance: He is well-developed. He is not diaphoretic.   HENT:      Head: Normocephalic and atraumatic.      Right Ear: External ear normal.      Left Ear: External ear normal.      Nose: Nose normal.     Eyes:      General: No scleral icterus.        Right eye: No discharge.         Left eye: No discharge.      Conjunctiva/sclera: Conjunctivae normal.       Cardiovascular:      Rate and Rhythm: Normal rate and regular rhythm.      Heart sounds: Normal heart sounds. No murmur heard.     No friction rub. No gallop.   Pulmonary:      Effort: Pulmonary effort is normal. No respiratory distress.      Breath sounds: Normal breath sounds. No wheezing or rales.   Abdominal:      General: Bowel sounds are normal. There is no distension.      Palpations: Abdomen is soft.      Tenderness: There is no abdominal tenderness. " There is no guarding or rebound.     Musculoskeletal:         General: Tenderness present.     Skin:     General: Skin is warm and dry.      Findings: No erythema or rash.     Neurological:      Mental Status: He is alert and oriented to person, place, and time.      Cranial Nerves: No cranial nerve deficit.      Sensory: No sensory deficit.      Motor: No abnormal muscle tone.     Psychiatric:         Behavior: Behavior normal.

## 2025-05-22 NOTE — ASSESSMENT & PLAN NOTE
Orders:    XR spine lumbar minimum 4 views non injury; Future    cyclobenzaprine (FLEXERIL) 10 mg tablet; Take 1 tablet (10 mg total) by mouth 3 (three) times a day as needed for muscle spasms    predniSONE 10 mg tablet; Take 40 mg daily for 3 days, 30 mg daily for 3 days, 20 mg daily for 3 days, 10 mg daily for 3 days

## 2025-05-23 ENCOUNTER — TELEPHONE (OUTPATIENT)
Age: 50
End: 2025-05-23

## 2025-05-23 DIAGNOSIS — M54.50 ACUTE BILATERAL LOW BACK PAIN WITHOUT SCIATICA: Primary | ICD-10-CM

## 2025-05-23 NOTE — TELEPHONE ENCOUNTER
Patient's wife, Katelynn, calling to discuss patient's X-ray results- please contact her at [phone]

## 2025-05-23 NOTE — TELEPHONE ENCOUNTER
Pt and pts wife are aware of results and to make appt with pain doctor, number [phone] was provide to make appt.

## 2025-06-03 ENCOUNTER — TELEPHONE (OUTPATIENT)
Dept: BARIATRICS | Facility: CLINIC | Age: 50
End: 2025-06-03

## 2025-06-10 ENCOUNTER — CONSULT (OUTPATIENT)
Dept: PAIN MEDICINE | Facility: CLINIC | Age: 50
End: 2025-06-10
Payer: COMMERCIAL

## 2025-06-10 ENCOUNTER — APPOINTMENT (OUTPATIENT)
Dept: LAB | Facility: MEDICAL CENTER | Age: 50
End: 2025-06-10
Attending: PAIN MEDICINE
Payer: COMMERCIAL

## 2025-06-10 ENCOUNTER — APPOINTMENT (OUTPATIENT)
Dept: RADIOLOGY | Facility: MEDICAL CENTER | Age: 50
End: 2025-06-10
Payer: COMMERCIAL

## 2025-06-10 VITALS — WEIGHT: 225 LBS | BODY MASS INDEX: 32.21 KG/M2 | HEIGHT: 70 IN

## 2025-06-10 DIAGNOSIS — M47.816 LUMBAR SPONDYLOSIS: ICD-10-CM

## 2025-06-10 DIAGNOSIS — M54.14 RADICULAR PAIN OF THORACIC REGION: ICD-10-CM

## 2025-06-10 DIAGNOSIS — M47.816 LUMBAR SPONDYLOSIS: Primary | ICD-10-CM

## 2025-06-10 LAB
ALBUMIN SERPL BCG-MCNC: 4.4 G/DL (ref 3.5–5)
ALP SERPL-CCNC: 60 U/L (ref 34–104)
ALT SERPL W P-5'-P-CCNC: 31 U/L (ref 7–52)
ANION GAP SERPL CALCULATED.3IONS-SCNC: 10 MMOL/L (ref 4–13)
AST SERPL W P-5'-P-CCNC: 24 U/L (ref 13–39)
BILIRUB SERPL-MCNC: 0.6 MG/DL (ref 0.2–1)
BUN SERPL-MCNC: 14 MG/DL (ref 5–25)
CALCIUM SERPL-MCNC: 9.1 MG/DL (ref 8.4–10.2)
CHLORIDE SERPL-SCNC: 105 MMOL/L (ref 96–108)
CO2 SERPL-SCNC: 25 MMOL/L (ref 21–32)
CREAT SERPL-MCNC: 1.06 MG/DL (ref 0.6–1.3)
GFR SERPL CREATININE-BSD FRML MDRD: 82 ML/MIN/1.73SQ M
GLUCOSE SERPL-MCNC: 84 MG/DL (ref 65–140)
POTASSIUM SERPL-SCNC: 4.1 MMOL/L (ref 3.5–5.3)
PROT SERPL-MCNC: 6.6 G/DL (ref 6.4–8.4)
SODIUM SERPL-SCNC: 140 MMOL/L (ref 135–147)

## 2025-06-10 PROCEDURE — 36415 COLL VENOUS BLD VENIPUNCTURE: CPT

## 2025-06-10 PROCEDURE — 72072 X-RAY EXAM THORAC SPINE 3VWS: CPT

## 2025-06-10 PROCEDURE — 99244 OFF/OP CNSLTJ NEW/EST MOD 40: CPT | Performed by: PAIN MEDICINE

## 2025-06-10 PROCEDURE — 80053 COMPREHEN METABOLIC PANEL: CPT

## 2025-06-10 RX ORDER — CELECOXIB 50 MG/1
50 CAPSULE ORAL 2 TIMES DAILY
Qty: 60 CAPSULE | Refills: 1 | Status: SHIPPED | OUTPATIENT
Start: 2025-06-10 | End: 2025-06-18

## 2025-06-10 NOTE — PROGRESS NOTES
Name: Ahsan Ba Jr.      : 1975      MRN: [id number]  Encounter Provider: Kvng Dos Santos MD  Encounter Date: 6/10/2025   Encounter department: [facility] [place]  :  Assessment & Plan  Lumbar spondylosis  Ahsan is a pleasant 50-year-old male with history of low back pain tenderness to palpation lumbar facets facet arthropathy advanced in the L4-5 L5-S1 level based on my interpretation of his x-ray he is currently done 6 weeks of chiropractic care in the last 6 months with Dr. Lazaro Euceda.  Will recommend at this time an MRI of his lumbar spine he has a slightly decreased GFR 67 still above 60.  Will start him on Celebrex 50 mg twice daily.  Orders:  •  celecoxib (CeleBREX) 50 MG capsule; Take 1 capsule (50 mg total) by mouth 2 (two) times a day  •  MRI lumbar spine wo contrast; Future    Radicular pain of thoracic region  Will obtain x-ray of his thoracic spine May consider obtaining a thoracic MRI if symptoms persist  Orders:  •  celecoxib (CeleBREX) 50 MG capsule; Take 1 capsule (50 mg total) by mouth 2 (two) times a day  •  XR spine thoracic 3 vw; Future            My impressions and treatment recommendations were discussed in detail with the patient who verbalized understanding and had no further questions.  Discharge instructions were provided. I personally saw and examined the patient and I agree with the above discussed plan of care.    History of Present Illness     Ahsan Ba Jr. is a 50 y.o. male with history of chronic pain in the lower back ongoing for several years increased after [employer] Day felt a sharp pain in his back denies radicular pain mostly pain is axial in nature increased with long periods of flexion then going into extension and feels mid back pain to low back pain he also complains of thoracic radicular symptoms that started around T10 and radiating to the umbilicus level.  He is currently sees chiropractic care has completed 6 weeks  "in last 6 months with Dr. Lazaro Euceda.  Notices some improvement with this he currently takes Flexeril with mild relief    Review of Systems  Medical History Reviewed by provider this encounter:     .       Objective   Ht 5' 10\" (1.778 m)   Wt 102 kg (225 lb)   BMI 32.28 kg/m²         Physical Exam      Lumbar Spine:  No masses or atrophy,    Range of motion - Decreased extension/flexion  Palpation -  Tenderness to palpation in the lumbar parapsinals   PSIS tenderness no  Rhett's/TRISTON no  Gaenslen's no  SLR no       Strength Right Left   Hip flexion L1,2 5 5   Knee extension L3 5 5   Ankle dorsiflexion L4 5 5   Great toe extension L5 5 5   Ankle Plantarflexion S1 5 5       Sensory Exam:  intact to light touch bilateral LE       Reflexes:     Right Left   Biceps 2+ 2+   Triceps 2+ 2+   Brachioradialis 2+ 2+   Patellar 2+ 2+   Achilles 2+ 2+   Babinski neg neg        Gait normal      Xray L spine    IMPRESSION:        Leftward thoracolumbar curvature. Slight retrolisthesis of L1 on L2.     Advanced multilevel degenerative facet hypertrophy and disc space narrowing in the lumbar spine                    "

## 2025-06-11 ENCOUNTER — RESULTS FOLLOW-UP (OUTPATIENT)
Dept: RADIOLOGY | Facility: HOSPITAL | Age: 50
End: 2025-06-11

## 2025-06-11 NOTE — TELEPHONE ENCOUNTER
----- Message from Kvng Dos Santos MD sent at 6/11/2025  8:44 AM EDT -----  Gfr is normal, ok to proceed with celebrex  ----- Message -----  From: Lab, Background User  Sent: 6/10/2025  10:55 PM EDT  To: Kvng Dos Santos MD

## 2025-06-18 DIAGNOSIS — M47.816 LUMBAR SPONDYLOSIS: Primary | ICD-10-CM

## 2025-06-19 ENCOUNTER — TELEPHONE (OUTPATIENT)
Dept: PAIN MEDICINE | Facility: CLINIC | Age: 50
End: 2025-06-19

## 2025-06-19 NOTE — TELEPHONE ENCOUNTER
S/W pt wife who will be contact chiropractor 6/20/25 to have chiropractor notes faxed over and needed for MRI auth review.  Please upload to pt chart and send message to clinical when uploaded.  TY

## 2025-06-24 ENCOUNTER — TELEPHONE (OUTPATIENT)
Dept: PAIN MEDICINE | Facility: CLINIC | Age: 50
End: 2025-06-24

## 2025-06-24 NOTE — TELEPHONE ENCOUNTER
S/W pt.  Pt to be requesting Chiropractor visits to be faxed to [facility] at [place].  When Chiropractor notes come through, please upload to chart and notify clinical and Auth team for MRI prior authorization    Thank you

## 2025-06-27 DIAGNOSIS — R09.81 SINUS CONGESTION: ICD-10-CM

## 2025-06-27 RX ORDER — AZELASTINE HYDROCHLORIDE 137 UG/1
1 SPRAY, METERED NASAL 2 TIMES DAILY
Qty: 30 ML | Refills: 1 | Status: SHIPPED | OUTPATIENT
Start: 2025-06-27

## 2025-07-03 ENCOUNTER — HOSPITAL ENCOUNTER (OUTPATIENT)
Dept: NON INVASIVE DIAGNOSTICS | Facility: HOSPITAL | Age: 50
Discharge: HOME | End: 2025-07-03
Attending: INTERNAL MEDICINE

## 2025-08-13 ENCOUNTER — OFFICE VISIT (OUTPATIENT)
Dept: FAMILY MEDICINE CLINIC | Facility: MEDICAL CENTER | Age: 50
End: 2025-08-13
Payer: COMMERCIAL

## 2025-08-21 ENCOUNTER — HOSPITAL ENCOUNTER (OUTPATIENT)
Dept: MRI IMAGING | Facility: CLINIC | Age: 50
Discharge: HOME | End: 2025-08-21
Payer: COMMERCIAL

## 2025-08-21 DIAGNOSIS — M47.816 LUMBAR SPONDYLOSIS: ICD-10-CM

## 2025-08-21 PROCEDURE — 72148 MRI LUMBAR SPINE W/O DYE: CPT
